# Patient Record
Sex: FEMALE | Race: WHITE | Employment: OTHER | URBAN - METROPOLITAN AREA
[De-identification: names, ages, dates, MRNs, and addresses within clinical notes are randomized per-mention and may not be internally consistent; named-entity substitution may affect disease eponyms.]

---

## 2018-06-05 PROBLEM — N39.0 RECURRENT UTI: Status: ACTIVE | Noted: 2018-06-05

## 2018-06-05 PROBLEM — R33.9 INCOMPLETE BLADDER EMPTYING: Status: ACTIVE | Noted: 2018-06-05

## 2019-07-29 PROBLEM — B02.9 VARICELLA ZOSTER: Status: ACTIVE | Noted: 2019-07-29

## 2020-10-08 ENCOUNTER — TRANSCRIBE ORDER (OUTPATIENT)
Dept: SCHEDULING | Age: 85
End: 2020-10-08

## 2020-10-08 DIAGNOSIS — Z12.31 VISIT FOR SCREENING MAMMOGRAM: Primary | ICD-10-CM

## 2021-07-16 ENCOUNTER — TRANSCRIBE ORDER (OUTPATIENT)
Dept: SCHEDULING | Age: 86
End: 2021-07-16

## 2021-07-16 DIAGNOSIS — R13.10 DYSPHAGIA: Primary | ICD-10-CM

## 2021-08-02 ENCOUNTER — HOSPITAL ENCOUNTER (OUTPATIENT)
Dept: GENERAL RADIOLOGY | Age: 86
Discharge: HOME | End: 2021-08-02
Attending: FAMILY MEDICINE
Payer: MEDICARE

## 2021-08-02 DIAGNOSIS — R13.10 DYSPHAGIA: ICD-10-CM

## 2021-08-02 PROCEDURE — 92611 MOTION FLUOROSCOPY/SWALLOW: CPT

## 2021-08-02 PROCEDURE — 74230 X-RAY XM SWLNG FUNCJ C+: CPT

## 2021-08-02 NOTE — PROGRESS NOTES
[facility]   [address], [address] [place] [address] [address]  Ph: [phone] Fax: [phone]    [address] [place] [address] STUDY  Patient: Louisa Mcintosh (49 y.o. female)  Date: 8/2/2021  Referring Provider: Gwendolyn Taylor NP    SUBJECTIVE:   Patient arrives today with complaints of pharyngeal sensation with food and drink (i.e. \"getting stuck\"), requiring liquid wash/subsequent swallows or oral regurgitation during mealtimes for the past 6 months. Patient presents with no relevant past medical history (i.e. stroke, COPD, etc.), but does report taking medication for reflux. The purpose of today's study is to further investigate patient's swallow function in light of patient's swallowing complaints. OBJECTIVE:   Past Medical History:  Past Medical History:   Diagnosis Date    Arthritis     Cancer ([address]     skin ca removed from nose and leg    Chronic pain     left knee    GERD (gastroesophageal reflux disease)     Hypertension     Menopause     Other ill-defined conditions(799.89)     HIGH CHOLESTEROL    Recurrent UTI     TMJ (dislocation of temporomandibular joint)      Past Surgical History:   Procedure Laterality Date    HX GI      COLONOSCOPY    HX HEENT      BILATERAL CATARACTS,     HX HEENT Right     DETACHED RETINA     HX ORTHOPAEDIC Right 4/2013    TKR    HX TONSILLECTOMY  1940'S     Current Dietary Status: Regular/Thin Liquid  Radiologist: Dr. Demetra Burgos Views: Lateral;Fluoro  Patient Position: Upright in chair    Trial 1: Trial 2:   Consistency Presented: Thin liquid Consistency Presented: Puree   How Presented: Self-fed/presented;Cup/sip;Cup/gulp; Successive swallows How Presented: Self-fed/presented;Spoon         Bolus Acceptance: No impairment Bolus Acceptance: No impairment   Bolus Formation/Control: No impairment:   Bolus Formation/Control: No impairment:     Propulsion: No impairment Propulsion: No impairment   Oral Residue: Palatal;Other (comment) (Trace oral residue visualized on the soft palate) Oral Residue: Lingual   Initiation of Swallow: Triggered at base of tongue Initiation of Swallow: Triggered at base of tongue   Timing: No impairment Timing: No impairment   Penetration: None Penetration: None   Aspiration/Timing: No evidence of aspiration Aspiration/Timing: No evidence of aspiration   Pharyngeal Clearance: No residue Pharyngeal Clearance: Vallecular residue; Less than 10%                       Comments: Subsequent swallow taken to clear oral residue     Trial 3:   Consistency Presented: Solid   How Presented: Self-fed/presented       Bolus Acceptance: No impairment   Bolus Formation/Control: Impaired: Premature spillage; Posterior   Propulsion: Delayed (# of seconds)   Oral Residue: None   Initiation of Swallow: Triggered at valleculae   Timing: Pooling 1-5 sec;Vallecular   Penetration: None   Aspiration/Timing: No evidence of aspiration   Pharyngeal Clearance: Vallecular residue; Less than 10%                     Decreased Tongue Base Retraction?: No  Laryngeal Elevation: WFL (within functional limits); Other (comment) (Mildly decreased hyolaryngeal excursion, but WFL)  Aspiration/Penetration Score: 1 (No penetration or aspiration-Contrast does not enter the airway)  Pharyngeal Symmetry: Not assessed  Pharyngeal-Esophageal Segment: No impairment  Pharyngeal Dysfunction: Decreased pharyngeal wall constriction    Oral Phase Severity: No impairment  Pharyngeal Phase Severity: N/A    ASSESSMENT :  Based on the objective data described above, the patient presents with a functional oropharyngeal swallow with all consistencies tried. Oral phase is characterized by mild premature spillage to the valleculae visualized with solid trial, resulting in mildly delayed swallow initiation.  Pharyngeal phase is characterized by decreased pharyngeal stripping wave and reduced hyolaryngeal excursion, both of which can be considered WFL given patient's advanced age. Mild vallecular residue observed with puree and solid trials. No penetration or aspiration visualized. Due to patient's history of reflux and reports of globus sensation, suspect esophageal dysphagia as oral/pharyngeal phase is [facility]. Recommend Regular/Thin Liquid diet with precautions outlined below. Results of the study were discussed with patient who verbalized understanding. PLAN/RECOMMENDATIONS :  -- Regular/Thin Liquids  -- Small Bites/Sips  -- Limit Distractions  -- Suggest GI referral to further investigate esophageal function     COMMUNICATION/EDUCATION:   The above findings and recommendations were discussed with: patient who verbalized understanding and will be faxed to referring provider.     Thank you for this referral.  MANAV Wiseman  Time Calculation: 45 mins

## 2021-10-04 ENCOUNTER — TRANSCRIBE ORDER (OUTPATIENT)
Dept: REGISTRATION | Age: 86
End: 2021-10-04

## 2021-10-04 ENCOUNTER — HOSPITAL ENCOUNTER (OUTPATIENT)
Dept: GENERAL RADIOLOGY | Age: 86
Discharge: HOME | End: 2021-10-04
Payer: MEDICARE
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
## 2021-10-04 DIAGNOSIS — R07.81 RIB PAIN: ICD-10-CM

## 2021-10-04 DIAGNOSIS — S20.219A CONTUSION OF CHEST WALL: ICD-10-CM

## 2021-10-04 PROCEDURE — 71101 X-RAY EXAM UNILAT RIBS/CHEST: CPT

## 2021-11-08 ENCOUNTER — TRANSCRIBE ORDER (OUTPATIENT)
Dept: SCHEDULING | Age: 86
End: 2021-11-08

## 2021-11-08 DIAGNOSIS — Z12.31 SCREENING MAMMOGRAM FOR HIGH-RISK PATIENT: Primary | ICD-10-CM

## 2022-03-19 PROBLEM — N39.0 RECURRENT UTI: Status: ACTIVE | Noted: 2018-06-05

## 2022-03-19 PROBLEM — B02.9 VARICELLA ZOSTER: Status: ACTIVE | Noted: 2019-07-29

## 2022-03-20 PROBLEM — R33.9 INCOMPLETE BLADDER EMPTYING: Status: ACTIVE | Noted: 2018-06-05

## 2022-03-30 ENCOUNTER — TRANSCRIBE ORDER (OUTPATIENT)
Dept: REGISTRATION | Age: 87
End: 2022-03-30

## 2022-03-30 ENCOUNTER — HOSPITAL ENCOUNTER (OUTPATIENT)
Dept: GENERAL RADIOLOGY | Age: 87
Discharge: HOME | End: 2022-03-30
Payer: MEDICARE
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
## 2022-03-30 DIAGNOSIS — M25.559 PAINFUL HIP: ICD-10-CM

## 2022-03-30 PROCEDURE — 73502 X-RAY EXAM HIP UNI 2-3 VIEWS: CPT

## 2022-03-30 PROCEDURE — 72220 X-RAY EXAM SACRUM TAILBONE: CPT

## 2022-03-31 ENCOUNTER — APPOINTMENT (OUTPATIENT)
Dept: CT IMAGING | Age: 87
End: 2022-03-31
Attending: EMERGENCY MEDICINE
Payer: MEDICARE

## 2022-03-31 ENCOUNTER — HOSPITAL ENCOUNTER (EMERGENCY)
Age: 87
Discharge: HOME | End: 2022-03-31
Attending: EMERGENCY MEDICINE
Payer: MEDICARE

## 2022-03-31 VITALS
HEIGHT: 65 IN | HEART RATE: 78 BPM | BODY MASS INDEX: 27.49 KG/M2 | RESPIRATION RATE: 25 BRPM | WEIGHT: 165 LBS | TEMPERATURE: 97.9 F | DIASTOLIC BLOOD PRESSURE: 79 MMHG | OXYGEN SATURATION: 91 % | SYSTOLIC BLOOD PRESSURE: 163 MMHG

## 2022-03-31 DIAGNOSIS — N30.00 ACUTE CYSTITIS WITHOUT HEMATURIA: Primary | ICD-10-CM

## 2022-03-31 DIAGNOSIS — R11.0 NAUSEA WITHOUT VOMITING: ICD-10-CM

## 2022-03-31 LAB
ALBUMIN SERPL-MCNC: 3.7 G/DL (ref 3.5–5)
ALBUMIN/GLOB SERPL: 1.1 {RATIO} (ref 1.1–2.2)
ALP SERPL-CCNC: 56 U/L (ref 45–117)
ALT SERPL-CCNC: 29 U/L (ref 12–78)
ANION GAP SERPL CALC-SCNC: 15 MMOL/L (ref 5–15)
APPEARANCE UR: ABNORMAL
AST SERPL-CCNC: 26 U/L (ref 15–37)
BACTERIA URNS QL MICRO: ABNORMAL /HPF
BASOPHILS # BLD: 0.1 K/UL (ref 0–0.1)
BASOPHILS NFR BLD: 1 % (ref 0–1)
BILIRUB SERPL-MCNC: 1.1 MG/DL (ref 0.2–1)
BILIRUB UR QL: NEGATIVE
BUN SERPL-MCNC: 17 MG/DL (ref 6–20)
BUN/CREAT SERPL: 18 (ref 12–20)
CALCIUM SERPL-MCNC: 9.6 MG/DL (ref 8.5–10.1)
CHLORIDE SERPL-SCNC: 101 MMOL/L (ref 97–108)
CO2 SERPL-SCNC: 22 MMOL/L (ref 21–32)
COLOR UR: ABNORMAL
COMMENT, HOLDF: NORMAL
CREAT SERPL-MCNC: 0.96 MG/DL (ref 0.55–1.02)
DIFFERENTIAL METHOD BLD: ABNORMAL
EOSINOPHIL # BLD: 0.2 K/UL (ref 0–0.4)
EOSINOPHIL NFR BLD: 2 % (ref 0–7)
EPITH CASTS URNS QL MICRO: ABNORMAL /LPF
ERYTHROCYTE [DISTWIDTH] IN BLOOD BY AUTOMATED COUNT: 13.1 % (ref 11.5–14.5)
GLOBULIN SER CALC-MCNC: 3.5 G/DL (ref 2–4)
GLUCOSE SERPL-MCNC: 146 MG/DL (ref 65–100)
GLUCOSE UR STRIP.AUTO-MCNC: NEGATIVE MG/DL
HCT VFR BLD AUTO: 38.6 % (ref 35–47)
HGB BLD-MCNC: 12.7 G/DL (ref 11.5–16)
HGB UR QL STRIP: NEGATIVE
IMM GRANULOCYTES # BLD AUTO: 0.1 K/UL (ref 0–0.04)
IMM GRANULOCYTES NFR BLD AUTO: 0 % (ref 0–0.5)
KETONES UR QL STRIP.AUTO: ABNORMAL MG/DL
LACTATE SERPL-SCNC: 1.6 MMOL/L (ref 0.4–2)
LEUKOCYTE ESTERASE UR QL STRIP.AUTO: ABNORMAL
LYMPHOCYTES # BLD: 2 K/UL (ref 0.8–3.5)
LYMPHOCYTES NFR BLD: 17 % (ref 12–49)
MCH RBC QN AUTO: 30.7 PG (ref 26–34)
MCHC RBC AUTO-ENTMCNC: 32.9 G/DL (ref 30–36.5)
MCV RBC AUTO: 93.2 FL (ref 80–99)
MONOCYTES # BLD: 0.7 K/UL (ref 0–1)
MONOCYTES NFR BLD: 5 % (ref 5–13)
NEUTS SEG # BLD: 9 K/UL (ref 1.8–8)
NEUTS SEG NFR BLD: 75 % (ref 32–75)
NITRITE UR QL STRIP.AUTO: POSITIVE
NRBC # BLD: 0 K/UL (ref 0–0.01)
NRBC BLD-RTO: 0 PER 100 WBC
PH UR STRIP: 7 [PH] (ref 5–8)
PLATELET # BLD AUTO: 182 K/UL (ref 150–400)
PMV BLD AUTO: 12.9 FL (ref 8.9–12.9)
POTASSIUM SERPL-SCNC: 3.7 MMOL/L (ref 3.5–5.1)
PROT SERPL-MCNC: 7.2 G/DL (ref 6.4–8.2)
PROT UR STRIP-MCNC: NEGATIVE MG/DL
RBC # BLD AUTO: 4.14 M/UL (ref 3.8–5.2)
RBC #/AREA URNS HPF: ABNORMAL /HPF (ref 0–5)
SAMPLES BEING HELD,HOLD: NORMAL
SODIUM SERPL-SCNC: 138 MMOL/L (ref 136–145)
SP GR UR REFRACTOMETRY: 1.01 (ref 1–1.03)
UROBILINOGEN UR QL STRIP.AUTO: 0.2 EU/DL (ref 0.2–1)
WBC # BLD AUTO: 12 K/UL (ref 3.6–11)
WBC URNS QL MICRO: ABNORMAL /HPF (ref 0–4)

## 2022-03-31 PROCEDURE — 96374 THER/PROPH/DIAG INJ IV PUSH: CPT

## 2022-03-31 PROCEDURE — 74177 CT ABD & PELVIS W/CONTRAST: CPT

## 2022-03-31 PROCEDURE — 74011000636 HC RX REV CODE- 636: Performed by: EMERGENCY MEDICINE

## 2022-03-31 PROCEDURE — 99285 EMERGENCY DEPT VISIT HI MDM: CPT

## 2022-03-31 PROCEDURE — 85025 COMPLETE CBC W/AUTO DIFF WBC: CPT

## 2022-03-31 PROCEDURE — 74011250636 HC RX REV CODE- 250/636: Performed by: EMERGENCY MEDICINE

## 2022-03-31 PROCEDURE — 70450 CT HEAD/BRAIN W/O DYE: CPT

## 2022-03-31 PROCEDURE — 83605 ASSAY OF LACTIC ACID: CPT

## 2022-03-31 PROCEDURE — 80053 COMPREHEN METABOLIC PANEL: CPT

## 2022-03-31 PROCEDURE — 36415 COLL VENOUS BLD VENIPUNCTURE: CPT

## 2022-03-31 PROCEDURE — 81001 URINALYSIS AUTO W/SCOPE: CPT

## 2022-03-31 RX ORDER — SODIUM CHLORIDE 0.9 % (FLUSH) 0.9 %
5-10 SYRINGE (ML) INJECTION ONCE
Status: DISCONTINUED | OUTPATIENT
Start: 2022-03-31 | End: 2022-03-31 | Stop reason: HOSPADM

## 2022-03-31 RX ORDER — CEPHALEXIN 500 MG/1
500 CAPSULE ORAL 3 TIMES DAILY
Qty: 21 CAPSULE | Refills: 0 | Status: SHIPPED | OUTPATIENT
Start: 2022-03-31 | End: 2022-04-07

## 2022-03-31 RX ORDER — ONDANSETRON 2 MG/ML
4 INJECTION INTRAMUSCULAR; INTRAVENOUS ONCE
Status: COMPLETED | OUTPATIENT
Start: 2022-03-31 | End: 2022-03-31

## 2022-03-31 RX ADMIN — IOPAMIDOL 100 ML: 612 INJECTION, SOLUTION INTRAVENOUS at 10:20

## 2022-03-31 RX ADMIN — ONDANSETRON 4 MG: 2 INJECTION INTRAMUSCULAR; INTRAVENOUS at 09:21

## 2022-03-31 RX ADMIN — SODIUM CHLORIDE 1000 ML: 9 INJECTION, SOLUTION INTRAVENOUS at 09:21

## 2022-03-31 NOTE — ED TRIAGE NOTES
Pt arrived POV with c/o nausea and vomiting bile since this AM, she also complains of hip pain, she fell the day before yesterday at an exercise class and was sent to this hospital to receive outpatient scans which were negative. [facility] called this morning to inform us they were sending her to be seen b/c they found her on the floor after another fall and she was still complaining of pelvic and hip pain from yesterday.

## 2022-03-31 NOTE — ED PROVIDER NOTES
EMERGENCY DEPARTMENT HISTORY AND PHYSICAL EXAM          Date: 3/31/2022  Patient Name: John Parish    History of Presenting Illness     Chief Complaint   Patient presents with    Nausea       History Provided By: Patient    HPI: John Parish is a 80 y.o. female, pmhx hypertension, GERD, chronic pain, who presents via private automobile to the ED c/o nausea and back pain    Patient was in exercise class yesterday at her facility when she lost her balance and fell backwards on her hip. She was seen yesterday in outpatient radiology and was doing well last night but this morning staff found her on the ground and she appeared altered and confused so they brought her to the ER for evaluation. Patient states she feels extremely nauseous but she has not eaten since yesterday so she has been able to vomit. She denies any fevers, chills as well as any abdominal pain, chest pain, neck pain and shortness of breath. She also denies any headache and dizziness and complains only of left low back pain when she is laying in trying to move. PCP: Rickey Parish NP    Allergies: Multiple  Social Hx: -tobacco, -vaping, -EtOH, -Illicit Drugs; Lives at [facility] assisted living    There are no other complaints, changes, or physical findings at this time. Current Outpatient Medications   Medication Sig Dispense Refill    cephALEXin (Keflex) 500 mg capsule Take 1 Capsule by mouth three (3) times daily for 7 days. 21 Capsule 0    traZODone (DESYREL) 50 mg tablet Take 1 Tab by mouth nightly. 30 Tab 2    ergocalciferol (VITAMIN D2) 50,000 unit capsule Take 1 Cap by mouth every seven (7) days.  aspirin-caffeine (BROCK BACK AND BODY) 500-32.5 mg tab Take  by mouth two (2) times daily as needed. [facility] -Takes two tabs Q AM and 2 Q PM.      VITAMIN B-12 1,000 mcg/mL injection 1 mL by IntraMUSCular route every month.  2 Vial 4    acetaminophen (TYLENOL ARTHRITIS PAIN) 650 mg TbER Take 650 mg by mouth every eight (8) hours.  dilTIAZem CD (CARDIZEM CD) 240 mg ER capsule Take 1 Cap by mouth daily. 30 Cap 5    triamcinolone (NASACORT) 55 mcg nasal inhaler 2 Sprays daily as needed.  melatonin 5 mg cap capsule Take 5 mg by mouth nightly.  Cetirizine (ZYRTEC) 10 mg cap Take 1 Tab by mouth daily as needed. 30 Cap 1    ibuprofen 200 mg cap Take 200 mg by mouth every six (6) hours as needed for Pain.  D-MANNOSE PO Take  by mouth daily as needed (for prevention of UTIs).  cranberry fruit extract (THERACRAN PO) Take  by mouth daily as needed.  ALPRAZolam (XANAX) 0.25 mg tablet Take 0.5 Tabs by mouth two (2) times daily as needed for Anxiety. Max Daily Amount: 0.25 mg. 30 Tab 1    acetaminophen (TYLENOL EXTRA STRENGTH) 500 mg tablet Take 1,000 mg by mouth every six (6) hours as needed for Pain.          Past History     Past Medical History:  Past Medical History:   Diagnosis Date    Arthritis     Cancer ([address]     skin ca removed from nose and leg    Chronic pain     left knee    GERD (gastroesophageal reflux disease)     Hypertension     Menopause     Other ill-defined conditions(799.89)     HIGH CHOLESTEROL    Recurrent UTI     TMJ (dislocation of temporomandibular joint)        Past Surgical History:  Past Surgical History:   Procedure Laterality Date    HX GI      COLONOSCOPY    HX HEENT      BILATERAL CATARACTS,     HX HEENT Right     DETACHED RETINA     HX ORTHOPAEDIC Right 4/2013    TKR    HX TONSILLECTOMY  1940'S       Family History:  Family History   Problem Relation Age of Onset    Cancer Mother         BREAST    Stroke Mother         3 X    Breast Cancer Mother     Cancer Father         BRAIN    Heart Disease Brother     Heart Attack Brother     Breast Cancer Daughter     Anesth Problems Neg Hx        Social History:  Social History     Tobacco Use    Smoking status: Former Smoker     Packs/day: 0.25     Years: 20.00     Pack years: 5.00     Quit date: 4/8/1987 Years since quittin.0    Smokeless tobacco: Never Used   Substance Use Topics    Alcohol use: Yes     Alcohol/week: 0.0 standard drinks     Types: 7 - 14 Glasses of wine per week    Drug use: No       Allergies: Allergies   Allergen Reactions    Other Food Nausea and Vomiting     CANTELOUPE    Ciprofloxacin Rash    Duloxetine Diarrhea and Nausea and Vomiting    Augmentin [Amoxicillin-Pot Clavulanate] Other (comments)     DOES NOT KNOW REACTION      Gabapentin Other (comments)    Penicillins Other (comments)     DOES NOT KNOW REACTION    Codeine Other (comments)     Lower my blood pressure         Review of Systems   Review of Systems   Constitutional: Negative for activity change, appetite change, chills, fever and unexpected weight change. HENT: Negative for congestion. Eyes: Negative for pain and visual disturbance. Respiratory: Negative for cough and shortness of breath. Cardiovascular: Negative for chest pain. Gastrointestinal: Positive for nausea. Negative for abdominal pain, diarrhea and vomiting. Genitourinary: Negative for dysuria. Musculoskeletal: Positive for back pain. Skin: Negative for rash. Neurological: Negative for headaches. Physical Exam   Physical Exam  Vitals and nursing note reviewed. Constitutional:       General: She is in acute distress. Appearance: She is well-developed. She is ill-appearing. She is not diaphoretic. Comments: This is a thin elderly female who appears in acute distress, with normal vital signs   HENT:      Head: Normocephalic and atraumatic. Eyes:      General:         Right eye: No discharge. Left eye: No discharge. Conjunctiva/sclera: Conjunctivae normal.      Pupils: Pupils are equal, round, and reactive to light. Cardiovascular:      Rate and Rhythm: Normal rate and regular rhythm. Heart sounds: Normal heart sounds. No murmur heard. No friction rub. No gallop.     Pulmonary:      Effort: Pulmonary effort is normal. No respiratory distress. Breath sounds: Normal breath sounds. No stridor. No wheezing, rhonchi or rales. Chest:      Chest wall: No tenderness. Abdominal:      General: Bowel sounds are normal. There is no distension. Palpations: Abdomen is soft. There is no mass. Tenderness: There is no abdominal tenderness. There is no guarding or rebound. Hernia: No hernia is present. Musculoskeletal:         General: No swelling, tenderness, deformity or signs of injury. Normal range of motion. Cervical back: Normal range of motion and neck supple. Right lower leg: No edema. Left lower leg: No edema. Comments: Unable to reproduce back pain on palpation. She does point to the area of the iliac crest as the site of pain   Skin:     General: Skin is warm and dry. Findings: No rash. Neurological:      Mental Status: She is alert and oriented to person, place, and time. Cranial Nerves: No cranial nerve deficit. Motor: No abnormal muscle tone. Diagnostic Study Results     Labs -     Recent Results (from the past 12 hour(s))   CBC WITH AUTOMATED DIFF    Collection Time: 03/31/22  8:42 AM   Result Value Ref Range    WBC 12.0 (H) 3.6 - 11.0 K/uL    RBC 4.14 3.80 - 5.20 M/uL    HGB 12.7 11.5 - 16.0 g/dL    HCT 38.6 35.0 - 47.0 %    MCV 93.2 80.0 - 99.0 FL    MCH 30.7 26.0 - 34.0 PG    MCHC 32.9 30.0 - 36.5 g/dL    RDW 13.1 11.5 - 14.5 %    PLATELET 033 473 - 850 K/uL    MPV 12.9 8.9 - 12.9 FL    NRBC 0.0 0  WBC    ABSOLUTE NRBC 0.00 0.00 - 0.01 K/uL    NEUTROPHILS 75 32 - 75 %    LYMPHOCYTES 17 12 - 49 %    MONOCYTES 5 5 - 13 %    EOSINOPHILS 2 0 - 7 %    BASOPHILS 1 0 - 1 %    IMMATURE GRANULOCYTES 0 0.0 - 0.5 %    ABS. NEUTROPHILS 9.0 (H) 1.8 - 8.0 K/UL    ABS. LYMPHOCYTES 2.0 0.8 - 3.5 K/UL    ABS. MONOCYTES 0.7 0.0 - 1.0 K/UL    ABS. EOSINOPHILS 0.2 0.0 - 0.4 K/UL    ABS. BASOPHILS 0.1 0.0 - 0.1 K/UL    ABS. IMM.  GRANS. 0.1 (H) 0.00 - 0.04 K/UL    DF AUTOMATED     METABOLIC PANEL, COMPREHENSIVE    Collection Time: 03/31/22  8:42 AM   Result Value Ref Range    Sodium 138 136 - 145 mmol/L    Potassium 3.7 3.5 - 5.1 mmol/L    Chloride 101 97 - 108 mmol/L    CO2 22 21 - 32 mmol/L    Anion gap 15 5 - 15 mmol/L    Glucose 146 (H) 65 - 100 mg/dL    BUN 17 6 - 20 MG/DL    Creatinine 0.96 0.55 - 1.02 MG/DL    BUN/Creatinine ratio 18 12 - 20      GFR est AA >60 >60 ml/min/1.73m2    GFR est non-AA 55 (L) >60 ml/min/1.73m2    Calcium 9.6 8.5 - 10.1 MG/DL    Bilirubin, total 1.1 (H) 0.2 - 1.0 MG/DL    ALT (SGPT) 29 12 - 78 U/L    AST (SGOT) 26 15 - 37 U/L    Alk. phosphatase 56 45 - 117 U/L    Protein, total 7.2 6.4 - 8.2 g/dL    Albumin 3.7 3.5 - 5.0 g/dL    Globulin 3.5 2.0 - 4.0 g/dL    A-G Ratio 1.1 1.1 - 2.2     LACTIC ACID    Collection Time: 03/31/22  8:42 AM   Result Value Ref Range    Lactic acid 1.6 0.4 - 2.0 MMOL/L   SAMPLES BEING HELD    Collection Time: 03/31/22  8:42 AM   Result Value Ref Range    SAMPLES BEING HELD 1SST, 1RED, 1BLUE     COMMENT        Add-on orders for these samples will be processed based on acceptable specimen integrity and analyte stability, which may vary by analyte. URINALYSIS W/ RFLX MICROSCOPIC    Collection Time: 03/31/22 10:38 AM   Result Value Ref Range    Color YELLOW/STRAW      Appearance HAZY (A) CLEAR      Specific gravity 1.010 1.003 - 1.030      pH (UA) 7.0 5.0 - 8.0      Protein Negative NEG mg/dL    Glucose Negative NEG mg/dL    Ketone TRACE (A) NEG mg/dL    Bilirubin Negative NEG      Blood Negative NEG      Urobilinogen 0.2 0.2 - 1.0 EU/dL    Nitrites Positive (A) NEG      Leukocyte Esterase SMALL (A) NEG     URINE MICROSCOPIC ONLY    Collection Time: 03/31/22 10:38 AM   Result Value Ref Range    WBC 5-10 0 - 4 /hpf    RBC 0-5 0 - 5 /hpf    Epithelial cells MODERATE (A) FEW /lpf    Bacteria 2+ (A) NEG /hpf       Radiologic Studies -   CT ABD PELV W CONT   Final Result   1. Patulous appearing gallbladder.  No calcified gallstones identified. No CT   evidence of cholecystitis. 2. Presence of a small, fat-containing inguinal hernia on the right. CT HEAD WO CONT   Final Result   1. No evidence of intracranial hemorrhage   2. Presence of moderate periventricular low density compatible with white matter   disease. CT Results  (Last 48 hours)               03/31/22 1029  CT ABD PELV W CONT Final result    Impression:  1. Patulous appearing gallbladder. No calcified gallstones identified. No CT   evidence of cholecystitis. 2. Presence of a small, fat-containing inguinal hernia on the right. Narrative:  EXAM: CT ABD PELV W CONT       INDICATION: vomiting bile/no pain       COMPARISON: None        CONTRAST: 100 mL of Isovue-300. TECHNIQUE:    Following the uneventful intravenous administration of contrast, thin axial   images were obtained through the abdomen and pelvis. Coronal and sagittal   reconstructions were generated. Oral contrast was not administered. CT dose   reduction was achieved through use of a standardized protocol tailored for this   examination and automatic exposure control for dose modulation. FINDINGS:    Lung bases: Clear. Incidentally imaged heart and mediastinum: Unremarkable. Liver: No mass or biliary dilatation. Gallbladder: The gallbladder is patulous in appearance. No calcified gallstones   are seen. There is no evidence of gallbladder wall thickening or pericholecystic   fluid/inflammatory change. Spleen: Within normal limits. Pancreas: No mass or ductal dilatation. Adrenals: Unremarkable. Kidneys: No mass or hydronephrosis. Stomach: Unremarkable. Small bowel: No dilatation or wall thickening. Colon: No dilatation or wall thickening. A moderate amount of gas and fecal   material is noted within the colon. There is evidence of colonic diverticulosis. Appendix: Not visualized   Peritoneum: No ascites or pneumoperitoneum.    Retroperitoneum: No lymphadenopathy or aortic aneurysm. Reproductive organs: The uterus is normal in appearance. Urinary bladder: No mass or calculus. Bones: No destructive bone lesion. There is evidence of spondylosis involving   the spine. There is evidence of lumbar scoliosis convex to the right. Additional comments: As seen on axial image 66, a small, fat-containing inguinal   hernia is noted on the right. 03/31/22 0910  CT HEAD WO CONT Final result    Impression:  1. No evidence of intracranial hemorrhage   2. Presence of moderate periventricular low density compatible with white matter   disease. Narrative:  EXAM: CT HEAD WO CONT       INDICATION: Fall yesterday with vomiting today       COMPARISON: None. CONTRAST: None. TECHNIQUE: Unenhanced CT of the head was performed using 5 mm images. Brain and   bone windows were generated. CT dose reduction was achieved through use of a   standardized protocol tailored for this examination and automatic exposure   control for dose modulation. FINDINGS:   No calvarial abnormalities are detected. Specifically, no depressed skull   fractures are seen. 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 There is no evidence of intracranial hemorrhage. Moderate periventricular low   density is present. This is compatible with white matter disease. There is   evidence of mild cerebral and cerebellar atrophy. CXR Results  (Last 48 hours)    None            Medical Decision Making   I am the first provider for this patient. I reviewed the vital signs, available nursing notes, past medical history, past surgical history, family history and social history. Vital Signs-Reviewed the patient's vital signs.   Patient Vitals for the past 12 hrs:   Temp Pulse Resp BP SpO2   03/31/22 1212   25  91 %   03/31/22 1157   19 (!) 163/79 95 %   03/31/22 1142  78 22  94 %   03/31/22 1127  76 19 (!) 173/79 100 %   03/31/22 1112  71 20 (!) 209/100 99 %   03/31/22 1057  70 18 (!) 185/83 95 %   03/31/22 1042  72 21 (!) 193/80 96 %   03/31/22 1012  70 17  94 %   03/31/22 0957  72 21 (!) 165/64 91 %   03/31/22 0942  77 23 (!) 174/71 93 %   03/31/22 0927  60 18 (!) 149/66 95 %   03/31/22 0902  66 21     03/31/22 0901  (!) 59 19 (!) 141/59 97 %   03/31/22 0857  60 19  92 %   03/31/22 0842    (!) 180/81    03/31/22 0832 97.9 °F (36.6 °C) 62 18 (!) 144/61 95 %       Pulse Oximetry Analysis - 97% on RA    Records Reviewed: Nursing Notes, Old Medical Records, Previous Radiology Studies and Previous Laboratory Studies    Provider Notes (Medical Decision Making):   MDM: Elderly female presents for evaluation of nausea and vomiting after fall yesterday and repeat fall this morning. On physical exam I do not see any evidence of focal musculoskeletal injury. She is awake and alert and acting appropriate and pupils are responsive. We will send her for CT head to make sure she did not have an injury in the fall this morning since staff found her on the ground after an unwitnessed fall. In regards to her pelvic pain, she had x-rays yesterday but will need CT pelvis to make sure there is not a subtle fracture missed on plain film imaging. ED Course:   Initial assessment performed. The patients presenting problems have been discussed, and they are in agreement with the care plan formulated and outlined with them. I have encouraged them to ask questions as they arise throughout their visit. PROGRESS NOTE:    ED Course as of 03/31/22 1732   Thu Mar 31, 2022   9888 CT had labs reviewed without focal abnormality except for a white count of 12. Request CT abdomen pelvis with urinalysis to be obtained. Patient appears improved after antiemetics. [JT]   1007 Patient reevaluated and states she feels much better. Discussed CT head findings and recommendations for CT pelvis and she understands. Await urinalysis.  [JT]   1222 Patient tolerating p.o. Antibiotics infused. Ready for discharge. [JT]      ED Course User Index  [JT] Jase Espinal MD        Discharge note:  Pt re-evaluated and noted to be feeling better, ready for discharge. Updated pt on all final lab and imaging findings. Will follow up as instructed. All questions have been answered, pt voiced understanding and agreement with plan. Abx were prescribed, pt advised that diarrhea and rash are possible side effects of the medications. Specific return precautions provided as well as instructions to return to the ED should sx worsen at any time. Vital signs stable for discharge. Critical Care Time:   0      Diagnosis     Clinical Impression:   1. Acute cystitis without hematuria    2. Nausea without vomiting        PLAN:  1. Discharge Medication List as of 3/31/2022 11:37 AM      START taking these medications    Details   cephALEXin (Keflex) 500 mg capsule Take 1 Capsule by mouth three (3) times daily for 7 days. , Normal, Disp-21 Capsule, R-0         CONTINUE these medications which have NOT CHANGED    Details   traZODone (DESYREL) 50 mg tablet Take 1 Tab by mouth nightly., Normal, Disp-30 Tab, R-2      ergocalciferol (VITAMIN D2) 50,000 unit capsule Take 1 Cap by mouth every seven (7) days. , Historical Med      aspirin-caffeine (BROCK BACK AND BODY) 500-32.5 mg tab Take  by mouth two (2) times daily as needed. [facility] -Takes two tabs Q AM and 2 Q PM., Historical Med      VITAMIN B-12 1,000 mcg/mL injection 1 mL by IntraMUSCular route every month., No Print, Disp-2 Vial, R-4, PABLO      acetaminophen (TYLENOL ARTHRITIS PAIN) 650 mg TbER Take 650 mg by mouth every eight (8) hours. , Historical Med      dilTIAZem CD (CARDIZEM CD) 240 mg ER capsule Take 1 Cap by mouth daily. , Normal, Disp-30 Cap, R-5      triamcinolone (NASACORT) 55 mcg nasal inhaler 2 Sprays daily as needed., Historical Med      melatonin 5 mg cap capsule Take 5 mg by mouth nightly., Historical Med      Cetirizine (ZYRTEC) 10 mg cap Take 1 Tab by mouth daily as needed., Normal, Disp-30 Cap, R-1      ibuprofen 200 mg cap Take 200 mg by mouth every six (6) hours as needed for Pain., Historical Med      D-MANNOSE PO Take  by mouth daily as needed (for prevention of UTIs). , Historical Med      cranberry fruit extract (THERACRAN PO) Take  by mouth daily as needed., Historical Med      ALPRAZolam (XANAX) 0.25 mg tablet Take 0.5 Tabs by mouth two (2) times daily as needed for Anxiety. Max Daily Amount: 0.25 mg., Print, Disp-30 Tab, R-1      acetaminophen (TYLENOL EXTRA STRENGTH) 500 mg tablet Take 1,000 mg by mouth every six (6) hours as needed for Pain., Historical Med           2. Follow-up Information     Follow up With Specialties Details Why Contact Bruce Little NP Nurse Practitioner  for recheck in 24 hours, If symptoms worsen         Return to ED if worse     Disposition:  Home       Please note, this dictation was completed with [employer], the [employer] voice recognition software. Quite often unanticipated grammatical, syntax, homophones, and other interpretive errors are inadvertently transcribed by the computer software. Please disregard these errors. Please excuse any errors that have escaped final proof reading.

## 2022-03-31 NOTE — DISCHARGE INSTRUCTIONS
Ms. Thierno Hernandez was sent to the ER for nausea and a second fall this morning. Her CT scans do not show any injury in her brain or any injury in her pelvis and back. Her urine test shows she has a urine infection and her blood shows that her white cell count is 12,000. We have given her a dose of ceftriaxone with fluids and Zofran here in the emergency room. She can continue on the antibiotic as prescribed outpatient.

## 2022-04-18 ENCOUNTER — TRANSCRIBE ORDER (OUTPATIENT)
Dept: SCHEDULING | Age: 87
End: 2022-04-18
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
## 2022-04-18 DIAGNOSIS — G89.29 CHRONIC BACK PAIN: Primary | ICD-10-CM
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
## 2022-04-18 DIAGNOSIS — M54.9 CHRONIC BACK PAIN: Primary | ICD-10-CM

## 2022-04-18 DIAGNOSIS — M25.551 RIGHT HIP PAIN: ICD-10-CM

## 2022-04-19 ENCOUNTER — HOSPITAL ENCOUNTER (OUTPATIENT)
Dept: MRI IMAGING | Age: 87
Discharge: HOME | End: 2022-04-19
Attending: INTERNAL MEDICINE
Payer: MEDICARE

## 2022-04-19 DIAGNOSIS — M54.9 CHRONIC BACK PAIN: ICD-10-CM
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
## 2022-04-19 DIAGNOSIS — G89.29 CHRONIC BACK PAIN: ICD-10-CM
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
## 2022-04-19 DIAGNOSIS — M25.551 RIGHT HIP PAIN: ICD-10-CM

## 2022-04-19 PROCEDURE — 72148 MRI LUMBAR SPINE W/O DYE: CPT

## 2022-04-28 ENCOUNTER — HOSPITAL ENCOUNTER (OUTPATIENT)
Dept: LAB | Age: 87
Discharge: HOME | End: 2022-04-28
Payer: MEDICARE

## 2022-04-28 LAB
ALBUMIN SERPL-MCNC: 3.2 G/DL (ref 3.5–5)
ALBUMIN/GLOB SERPL: 1.1 {RATIO} (ref 1.1–2.2)
ALP SERPL-CCNC: 86 U/L (ref 45–117)
ALT SERPL-CCNC: 26 U/L (ref 12–78)
ANION GAP SERPL CALC-SCNC: 9 MMOL/L (ref 5–15)
AST SERPL-CCNC: 18 U/L (ref 15–37)
BILIRUB SERPL-MCNC: 0.4 MG/DL (ref 0.2–1)
BUN SERPL-MCNC: 12 MG/DL (ref 6–20)
BUN/CREAT SERPL: 20 (ref 12–20)
CALCIUM SERPL-MCNC: 8.5 MG/DL (ref 8.5–10.1)
CHLORIDE SERPL-SCNC: 95 MMOL/L (ref 97–108)
CO2 SERPL-SCNC: 26 MMOL/L (ref 21–32)
CREAT SERPL-MCNC: 0.61 MG/DL (ref 0.55–1.02)
GLOBULIN SER CALC-MCNC: 2.9 G/DL (ref 2–4)
GLUCOSE SERPL-MCNC: 85 MG/DL (ref 65–100)
POTASSIUM SERPL-SCNC: 4.2 MMOL/L (ref 3.5–5.1)
PROT SERPL-MCNC: 6.1 G/DL (ref 6.4–8.2)
SODIUM SERPL-SCNC: 130 MMOL/L (ref 136–145)

## 2022-04-28 PROCEDURE — 80053 COMPREHEN METABOLIC PANEL: CPT

## 2022-05-02 ENCOUNTER — HOSPITAL ENCOUNTER (OUTPATIENT)
Dept: LAB | Age: 87
Discharge: HOME | End: 2022-05-02
Payer: MEDICARE

## 2022-05-02 LAB
ALBUMIN SERPL-MCNC: 3.2 G/DL (ref 3.5–5)
ALBUMIN/GLOB SERPL: 1.1 {RATIO} (ref 1.1–2.2)
ALP SERPL-CCNC: 76 U/L (ref 45–117)
ALT SERPL-CCNC: 23 U/L (ref 12–78)
ANION GAP SERPL CALC-SCNC: 11 MMOL/L (ref 5–15)
AST SERPL-CCNC: 19 U/L (ref 15–37)
BILIRUB SERPL-MCNC: 0.4 MG/DL (ref 0.2–1)
BUN SERPL-MCNC: 11 MG/DL (ref 6–20)
BUN/CREAT SERPL: 18 (ref 12–20)
CALCIUM SERPL-MCNC: 8.2 MG/DL (ref 8.5–10.1)
CHLORIDE SERPL-SCNC: 96 MMOL/L (ref 97–108)
CO2 SERPL-SCNC: 24 MMOL/L (ref 21–32)
CREAT SERPL-MCNC: 0.62 MG/DL (ref 0.55–1.02)
GLOBULIN SER CALC-MCNC: 2.8 G/DL (ref 2–4)
GLUCOSE SERPL-MCNC: 83 MG/DL (ref 65–100)
POTASSIUM SERPL-SCNC: 4.5 MMOL/L (ref 3.5–5.1)
PROT SERPL-MCNC: 6 G/DL (ref 6.4–8.2)
SODIUM SERPL-SCNC: 131 MMOL/L (ref 136–145)

## 2022-05-02 PROCEDURE — 80053 COMPREHEN METABOLIC PANEL: CPT

## 2022-05-06 ENCOUNTER — HOSPITAL ENCOUNTER (OUTPATIENT)
Dept: LAB | Age: 87
Discharge: HOME | End: 2022-05-06
Payer: MEDICARE

## 2022-05-06 LAB
ALBUMIN SERPL-MCNC: 3.2 G/DL (ref 3.5–5)
ALBUMIN/GLOB SERPL: 1.1 {RATIO} (ref 1.1–2.2)
ALP SERPL-CCNC: 74 U/L (ref 45–117)
ALT SERPL-CCNC: 27 U/L (ref 12–78)
ANION GAP SERPL CALC-SCNC: 11 MMOL/L (ref 5–15)
AST SERPL-CCNC: 18 U/L (ref 15–37)
BILIRUB SERPL-MCNC: 0.4 MG/DL (ref 0.2–1)
BUN SERPL-MCNC: 14 MG/DL (ref 6–20)
BUN/CREAT SERPL: 21 (ref 12–20)
CALCIUM SERPL-MCNC: 8.6 MG/DL (ref 8.5–10.1)
CHLORIDE SERPL-SCNC: 97 MMOL/L (ref 97–108)
CO2 SERPL-SCNC: 25 MMOL/L (ref 21–32)
CREAT SERPL-MCNC: 0.66 MG/DL (ref 0.55–1.02)
GLOBULIN SER CALC-MCNC: 2.9 G/DL (ref 2–4)
GLUCOSE SERPL-MCNC: 99 MG/DL (ref 65–100)
POTASSIUM SERPL-SCNC: 4.3 MMOL/L (ref 3.5–5.1)
PROT SERPL-MCNC: 6.1 G/DL (ref 6.4–8.2)
SODIUM SERPL-SCNC: 133 MMOL/L (ref 136–145)

## 2022-05-06 PROCEDURE — 80053 COMPREHEN METABOLIC PANEL: CPT

## 2022-05-13 ENCOUNTER — HOSPITAL ENCOUNTER (OUTPATIENT)
Dept: LAB | Age: 87
Discharge: HOME | End: 2022-05-13
Payer: MEDICARE

## 2022-05-13 LAB
ALBUMIN SERPL-MCNC: 3.2 G/DL (ref 3.5–5)
ALBUMIN/GLOB SERPL: 1.2 {RATIO} (ref 1.1–2.2)
ALP SERPL-CCNC: 70 U/L (ref 45–117)
ALT SERPL-CCNC: 21 U/L (ref 12–78)
ANION GAP SERPL CALC-SCNC: 12 MMOL/L (ref 5–15)
AST SERPL-CCNC: 14 U/L (ref 15–37)
BILIRUB SERPL-MCNC: 0.4 MG/DL (ref 0.2–1)
BUN SERPL-MCNC: 15 MG/DL (ref 6–20)
BUN/CREAT SERPL: 22 (ref 12–20)
CALCIUM SERPL-MCNC: 8.6 MG/DL (ref 8.5–10.1)
CHLORIDE SERPL-SCNC: 98 MMOL/L (ref 97–108)
CO2 SERPL-SCNC: 24 MMOL/L (ref 21–32)
CREAT SERPL-MCNC: 0.69 MG/DL (ref 0.55–1.02)
GLOBULIN SER CALC-MCNC: 2.7 G/DL (ref 2–4)
GLUCOSE SERPL-MCNC: 78 MG/DL (ref 65–100)
POTASSIUM SERPL-SCNC: 4.4 MMOL/L (ref 3.5–5.1)
PROT SERPL-MCNC: 5.9 G/DL (ref 6.4–8.2)
SODIUM SERPL-SCNC: 134 MMOL/L (ref 136–145)

## 2022-05-13 PROCEDURE — 80053 COMPREHEN METABOLIC PANEL: CPT

## 2022-05-20 ENCOUNTER — HOSPITAL ENCOUNTER (OUTPATIENT)
Dept: LAB | Age: 87
Discharge: HOME | End: 2022-05-20
Payer: MEDICARE

## 2022-05-20 LAB
ALBUMIN SERPL-MCNC: 3.4 G/DL (ref 3.5–5)
ALBUMIN/GLOB SERPL: 1.1 {RATIO} (ref 1.1–2.2)
ALP SERPL-CCNC: 80 U/L (ref 45–117)
ALT SERPL-CCNC: 20 U/L (ref 12–78)
ANION GAP SERPL CALC-SCNC: 10 MMOL/L (ref 5–15)
AST SERPL-CCNC: 16 U/L (ref 15–37)
BILIRUB SERPL-MCNC: 0.6 MG/DL (ref 0.2–1)
BUN SERPL-MCNC: 12 MG/DL (ref 6–20)
BUN/CREAT SERPL: 16 (ref 12–20)
CALCIUM SERPL-MCNC: 8.9 MG/DL (ref 8.5–10.1)
CHLORIDE SERPL-SCNC: 95 MMOL/L (ref 97–108)
CO2 SERPL-SCNC: 25 MMOL/L (ref 21–32)
CREAT SERPL-MCNC: 0.73 MG/DL (ref 0.55–1.02)
GLOBULIN SER CALC-MCNC: 3 G/DL (ref 2–4)
GLUCOSE SERPL-MCNC: 99 MG/DL (ref 65–100)
POTASSIUM SERPL-SCNC: 4.7 MMOL/L (ref 3.5–5.1)
PROT SERPL-MCNC: 6.4 G/DL (ref 6.4–8.2)
SODIUM SERPL-SCNC: 130 MMOL/L (ref 136–145)

## 2022-05-20 PROCEDURE — 80053 COMPREHEN METABOLIC PANEL: CPT

## 2022-05-23 ENCOUNTER — HOSPITAL ENCOUNTER (OUTPATIENT)
Dept: LAB | Age: 87
Discharge: HOME | End: 2022-05-23
Attending: INTERNAL MEDICINE
Payer: MEDICARE

## 2022-05-23 ENCOUNTER — APPOINTMENT (OUTPATIENT)
Dept: LAB | Age: 87
DRG: 178 | End: 2022-05-23
Payer: MEDICARE

## 2022-05-23 PROCEDURE — 87077 CULTURE AEROBIC IDENTIFY: CPT

## 2022-05-23 PROCEDURE — 81001 URINALYSIS AUTO W/SCOPE: CPT

## 2022-05-23 PROCEDURE — 36415 COLL VENOUS BLD VENIPUNCTURE: CPT

## 2022-05-23 PROCEDURE — 87086 URINE CULTURE/COLONY COUNT: CPT

## 2022-05-23 PROCEDURE — 87186 SC STD MICRODIL/AGAR DIL: CPT

## 2022-05-23 PROCEDURE — 80053 COMPREHEN METABOLIC PANEL: CPT

## 2022-05-23 PROCEDURE — 85025 COMPLETE CBC W/AUTO DIFF WBC: CPT

## 2022-05-24 ENCOUNTER — HOSPITAL ENCOUNTER (OUTPATIENT)
Dept: LAB | Age: 87
Discharge: HOME | End: 2022-05-24

## 2022-05-24 ENCOUNTER — TRANSCRIBE ORDER (OUTPATIENT)
Dept: REGISTRATION | Age: 87
End: 2022-05-24

## 2022-05-24 DIAGNOSIS — R41.0 SUBACUTE DELIRIUM: Primary | ICD-10-CM

## 2022-05-24 DIAGNOSIS — R41.0 SUBACUTE DELIRIUM: ICD-10-CM

## 2022-05-24 LAB
ALBUMIN SERPL-MCNC: 3.2 G/DL (ref 3.5–5)
ALBUMIN/GLOB SERPL: 1 {RATIO} (ref 1.1–2.2)
ALP SERPL-CCNC: 78 U/L (ref 45–117)
ALT SERPL-CCNC: 19 U/L (ref 12–78)
ANION GAP SERPL CALC-SCNC: 11 MMOL/L (ref 5–15)
APPEARANCE UR: CLEAR
AST SERPL-CCNC: 17 U/L (ref 15–37)
BACTERIA URNS QL MICRO: ABNORMAL /HPF
BASOPHILS # BLD: 0.1 K/UL (ref 0–0.1)
BASOPHILS NFR BLD: 1 % (ref 0–1)
BILIRUB SERPL-MCNC: 0.3 MG/DL (ref 0.2–1)
BILIRUB UR QL: NEGATIVE
BUN SERPL-MCNC: 13 MG/DL (ref 6–20)
BUN/CREAT SERPL: 18 (ref 12–20)
CALCIUM SERPL-MCNC: 8.4 MG/DL (ref 8.5–10.1)
CHLORIDE SERPL-SCNC: 92 MMOL/L (ref 97–108)
CO2 SERPL-SCNC: 23 MMOL/L (ref 21–32)
COLOR UR: ABNORMAL
CREAT SERPL-MCNC: 0.71 MG/DL (ref 0.55–1.02)
DIFFERENTIAL METHOD BLD: ABNORMAL
EOSINOPHIL # BLD: 0.1 K/UL (ref 0–0.4)
EOSINOPHIL NFR BLD: 2 % (ref 0–7)
EPITH CASTS URNS QL MICRO: ABNORMAL /LPF
ERYTHROCYTE [DISTWIDTH] IN BLOOD BY AUTOMATED COUNT: 12.7 % (ref 11.5–14.5)
GLOBULIN SER CALC-MCNC: 3.1 G/DL (ref 2–4)
GLUCOSE SERPL-MCNC: 137 MG/DL (ref 65–100)
GLUCOSE UR STRIP.AUTO-MCNC: NEGATIVE MG/DL
HCT VFR BLD AUTO: 31.2 % (ref 35–47)
HGB BLD-MCNC: 10.8 G/DL (ref 11.5–16)
HGB UR QL STRIP: NEGATIVE
IMM GRANULOCYTES # BLD AUTO: 0 K/UL (ref 0–0.04)
IMM GRANULOCYTES NFR BLD AUTO: 0 % (ref 0–0.5)
KETONES UR QL STRIP.AUTO: ABNORMAL MG/DL
LEUKOCYTE ESTERASE UR QL STRIP.AUTO: ABNORMAL
LYMPHOCYTES # BLD: 1.4 K/UL (ref 0.8–3.5)
LYMPHOCYTES NFR BLD: 19 % (ref 12–49)
MCH RBC QN AUTO: 30 PG (ref 26–34)
MCHC RBC AUTO-ENTMCNC: 34.6 G/DL (ref 30–36.5)
MCV RBC AUTO: 86.7 FL (ref 80–99)
MONOCYTES # BLD: 1 K/UL (ref 0–1)
MONOCYTES NFR BLD: 13 % (ref 5–13)
NEUTS SEG # BLD: 4.8 K/UL (ref 1.8–8)
NEUTS SEG NFR BLD: 65 % (ref 32–75)
NITRITE UR QL STRIP.AUTO: POSITIVE
NRBC # BLD: 0 K/UL (ref 0–0.01)
NRBC BLD-RTO: 0 PER 100 WBC
PH UR STRIP: 6.5 [PH] (ref 5–8)
PLATELET # BLD AUTO: 195 K/UL (ref 150–400)
PLATELET COMMENTS,PCOM: ABNORMAL
PMV BLD AUTO: 13.8 FL (ref 8.9–12.9)
POTASSIUM SERPL-SCNC: 3.9 MMOL/L (ref 3.5–5.1)
PROT SERPL-MCNC: 6.3 G/DL (ref 6.4–8.2)
PROT UR STRIP-MCNC: NEGATIVE MG/DL
RBC # BLD AUTO: 3.6 M/UL (ref 3.8–5.2)
RBC #/AREA URNS HPF: ABNORMAL /HPF (ref 0–5)
RBC MORPH BLD: ABNORMAL
SODIUM SERPL-SCNC: 126 MMOL/L (ref 136–145)
SP GR UR REFRACTOMETRY: 1.01 (ref 1–1.03)
UA: UC IF INDICATED,UAUC: ABNORMAL
UROBILINOGEN UR QL STRIP.AUTO: 0.2 EU/DL (ref 0.2–1)
WBC # BLD AUTO: 7.4 K/UL (ref 3.6–11)
WBC URNS QL MICRO: ABNORMAL /HPF (ref 0–4)

## 2022-05-25 ENCOUNTER — HOSPITAL ENCOUNTER (OUTPATIENT)
Dept: LAB | Age: 87
Discharge: HOME | End: 2022-05-25
Payer: MEDICARE

## 2022-05-25 LAB
ANION GAP SERPL CALC-SCNC: 11 MMOL/L (ref 5–15)
BUN SERPL-MCNC: 11 MG/DL (ref 6–20)
BUN/CREAT SERPL: 19 (ref 12–20)
CALCIUM SERPL-MCNC: 8.1 MG/DL (ref 8.5–10.1)
CHLORIDE SERPL-SCNC: 96 MMOL/L (ref 97–108)
CO2 SERPL-SCNC: 24 MMOL/L (ref 21–32)
CREAT SERPL-MCNC: 0.58 MG/DL (ref 0.55–1.02)
GLUCOSE SERPL-MCNC: 88 MG/DL (ref 65–100)
POTASSIUM SERPL-SCNC: 4.6 MMOL/L (ref 3.5–5.1)
SODIUM SERPL-SCNC: 131 MMOL/L (ref 136–145)

## 2022-05-25 PROCEDURE — 80048 BASIC METABOLIC PNL TOTAL CA: CPT

## 2022-05-26 LAB
BACTERIA SPEC CULT: ABNORMAL
BACTERIA SPEC CULT: ABNORMAL
CC UR VC: ABNORMAL
SARS-COV-2, NAA: DETECTED
SERVICE CMNT-IMP: ABNORMAL

## 2022-05-29 ENCOUNTER — APPOINTMENT (OUTPATIENT)
Dept: CT IMAGING | Age: 87
DRG: 178 | End: 2022-05-29
Attending: FAMILY MEDICINE
Payer: MEDICARE

## 2022-05-29 ENCOUNTER — APPOINTMENT (OUTPATIENT)
Dept: GENERAL RADIOLOGY | Age: 87
DRG: 178 | End: 2022-05-29
Attending: FAMILY MEDICINE
Payer: MEDICARE

## 2022-05-29 ENCOUNTER — HOSPITAL ENCOUNTER (INPATIENT)
Age: 87
LOS: 9 days | Discharge: NURSING, REHAB OR LONG-TERM CARE | DRG: 178 | End: 2022-06-09
Attending: FAMILY MEDICINE | Admitting: INTERNAL MEDICINE
Payer: MEDICARE

## 2022-05-29 DIAGNOSIS — G93.40 ENCEPHALOPATHY: ICD-10-CM

## 2022-05-29 DIAGNOSIS — I67.9 CEREBRAL VASCULAR DISEASE: ICD-10-CM

## 2022-05-29 DIAGNOSIS — U07.1 COVID-19: ICD-10-CM

## 2022-05-29 DIAGNOSIS — R41.0 DELIRIUM: ICD-10-CM

## 2022-05-29 DIAGNOSIS — R41.0 DISORIENTATION: Primary | ICD-10-CM

## 2022-05-29 PROBLEM — R41.82 AMS (ALTERED MENTAL STATUS): Status: ACTIVE | Noted: 2022-05-29

## 2022-05-29 LAB
ALBUMIN SERPL-MCNC: 3.5 G/DL (ref 3.5–5)
ALBUMIN/GLOB SERPL: 1.1 {RATIO} (ref 1.1–2.2)
ALP SERPL-CCNC: 69 U/L (ref 45–117)
ALT SERPL-CCNC: 25 U/L (ref 12–78)
ANION GAP SERPL CALC-SCNC: 12 MMOL/L (ref 5–15)
APPEARANCE UR: CLEAR
AST SERPL-CCNC: 29 U/L (ref 15–37)
BACTERIA URNS QL MICRO: ABNORMAL /HPF
BASOPHILS # BLD: 0.1 K/UL (ref 0–0.1)
BASOPHILS NFR BLD: 1 % (ref 0–1)
BILIRUB SERPL-MCNC: 0.5 MG/DL (ref 0.2–1)
BILIRUB UR QL: NEGATIVE
BUN SERPL-MCNC: 19 MG/DL (ref 6–20)
BUN/CREAT SERPL: 14 (ref 12–20)
CALCIUM SERPL-MCNC: 9 MG/DL (ref 8.5–10.1)
CHLORIDE SERPL-SCNC: 93 MMOL/L (ref 97–108)
CO2 SERPL-SCNC: 22 MMOL/L (ref 21–32)
COLOR UR: ABNORMAL
CREAT SERPL-MCNC: 1.35 MG/DL (ref 0.55–1.02)
DIFFERENTIAL METHOD BLD: ABNORMAL
EOSINOPHIL # BLD: 0.1 K/UL (ref 0–0.4)
EOSINOPHIL NFR BLD: 1 % (ref 0–7)
EPITH CASTS URNS QL MICRO: ABNORMAL /LPF
ERYTHROCYTE [DISTWIDTH] IN BLOOD BY AUTOMATED COUNT: 12.8 % (ref 11.5–14.5)
GLOBULIN SER CALC-MCNC: 3.1 G/DL (ref 2–4)
GLUCOSE BLD STRIP.AUTO-MCNC: 112 MG/DL (ref 65–117)
GLUCOSE SERPL-MCNC: 98 MG/DL (ref 65–100)
GLUCOSE UR STRIP.AUTO-MCNC: NEGATIVE MG/DL
HCT VFR BLD AUTO: 32 % (ref 35–47)
HGB BLD-MCNC: 11.3 G/DL (ref 11.5–16)
HGB UR QL STRIP: ABNORMAL
IMM GRANULOCYTES # BLD AUTO: 0.1 K/UL (ref 0–0.04)
IMM GRANULOCYTES NFR BLD AUTO: 2 % (ref 0–0.5)
KETONES UR QL STRIP.AUTO: ABNORMAL MG/DL
LACTATE SERPL-SCNC: 1 MMOL/L (ref 0.4–2)
LEUKOCYTE ESTERASE UR QL STRIP.AUTO: NEGATIVE
LYMPHOCYTES # BLD: 1.5 K/UL (ref 0.8–3.5)
LYMPHOCYTES NFR BLD: 18 % (ref 12–49)
MCH RBC QN AUTO: 29.9 PG (ref 26–34)
MCHC RBC AUTO-ENTMCNC: 35.3 G/DL (ref 30–36.5)
MCV RBC AUTO: 84.7 FL (ref 80–99)
MONOCYTES # BLD: 1.2 K/UL (ref 0–1)
MONOCYTES NFR BLD: 14 % (ref 5–13)
NEUTS SEG # BLD: 5.4 K/UL (ref 1.8–8)
NEUTS SEG NFR BLD: 64 % (ref 32–75)
NITRITE UR QL STRIP.AUTO: NEGATIVE
NRBC # BLD: 0 K/UL (ref 0–0.01)
NRBC BLD-RTO: 0 PER 100 WBC
PH UR STRIP: 6 [PH] (ref 5–8)
PLATELET # BLD AUTO: 220 K/UL (ref 150–400)
PMV BLD AUTO: 12.6 FL (ref 8.9–12.9)
POTASSIUM SERPL-SCNC: 4.1 MMOL/L (ref 3.5–5.1)
PROT SERPL-MCNC: 6.6 G/DL (ref 6.4–8.2)
PROT UR STRIP-MCNC: ABNORMAL MG/DL
RBC # BLD AUTO: 3.78 M/UL (ref 3.8–5.2)
RBC #/AREA URNS HPF: ABNORMAL /HPF (ref 0–5)
SERVICE CMNT-IMP: NORMAL
SODIUM SERPL-SCNC: 127 MMOL/L (ref 136–145)
SP GR UR REFRACTOMETRY: 1.02 (ref 1–1.03)
UROBILINOGEN UR QL STRIP.AUTO: 0.2 EU/DL (ref 0.2–1)
WBC # BLD AUTO: 8.4 K/UL (ref 3.6–11)
WBC URNS QL MICRO: ABNORMAL /HPF (ref 0–4)

## 2022-05-29 PROCEDURE — 82962 GLUCOSE BLOOD TEST: CPT

## 2022-05-29 PROCEDURE — 36415 COLL VENOUS BLD VENIPUNCTURE: CPT

## 2022-05-29 PROCEDURE — 93005 ELECTROCARDIOGRAM TRACING: CPT

## 2022-05-29 PROCEDURE — 85025 COMPLETE CBC W/AUTO DIFF WBC: CPT

## 2022-05-29 PROCEDURE — G0378 HOSPITAL OBSERVATION PER HR: HCPCS

## 2022-05-29 PROCEDURE — 71045 X-RAY EXAM CHEST 1 VIEW: CPT

## 2022-05-29 PROCEDURE — 70450 CT HEAD/BRAIN W/O DYE: CPT

## 2022-05-29 PROCEDURE — 81001 URINALYSIS AUTO W/SCOPE: CPT

## 2022-05-29 PROCEDURE — 99285 EMERGENCY DEPT VISIT HI MDM: CPT

## 2022-05-29 PROCEDURE — 83605 ASSAY OF LACTIC ACID: CPT

## 2022-05-29 PROCEDURE — 87086 URINE CULTURE/COLONY COUNT: CPT

## 2022-05-29 PROCEDURE — 80053 COMPREHEN METABOLIC PANEL: CPT

## 2022-05-29 RX ORDER — TRAZODONE HYDROCHLORIDE 50 MG/1
50 TABLET ORAL
Status: DISCONTINUED | OUTPATIENT
Start: 2022-05-29 | End: 2022-06-04

## 2022-05-29 RX ORDER — ONDANSETRON 2 MG/ML
4 INJECTION INTRAMUSCULAR; INTRAVENOUS
Status: DISCONTINUED | OUTPATIENT
Start: 2022-05-29 | End: 2022-06-09 | Stop reason: HOSPADM

## 2022-05-29 RX ORDER — ACETAMINOPHEN 325 MG/1
650 TABLET ORAL
Status: DISCONTINUED | OUTPATIENT
Start: 2022-05-29 | End: 2022-06-09 | Stop reason: HOSPADM

## 2022-05-29 RX ORDER — ALPRAZOLAM 0.5 MG/1
0.5 TABLET ORAL
Status: DISCONTINUED | OUTPATIENT
Start: 2022-05-29 | End: 2022-06-02 | Stop reason: DRUGHIGH

## 2022-05-29 RX ORDER — SODIUM CHLORIDE 9 MG/ML
100 INJECTION, SOLUTION INTRAVENOUS CONTINUOUS
Status: DISCONTINUED | OUTPATIENT
Start: 2022-05-29 | End: 2022-05-31

## 2022-05-29 RX ORDER — DILTIAZEM HYDROCHLORIDE 240 MG/1
240 CAPSULE, COATED, EXTENDED RELEASE ORAL DAILY
Status: DISCONTINUED | OUTPATIENT
Start: 2022-05-30 | End: 2022-05-30

## 2022-05-29 NOTE — Clinical Note
Patient Class[de-identified] OBSERVATION [706]   Type of Bed: Medical [8]   Cardiac Monitoring Required?: No   Reason for Observation: AMS   Admitting Diagnosis: AMS (altered mental status) [5953484]   Admitting Physician: Jeff Granger [[id number]   Attending Physician: Jeff Granger [[id number]]

## 2022-05-30 PROBLEM — N17.9 AKI (ACUTE KIDNEY INJURY) (HCC): Status: ACTIVE | Noted: 2022-05-30

## 2022-05-30 PROBLEM — U07.1 COVID-19: Status: ACTIVE | Noted: 2022-05-30

## 2022-05-30 PROBLEM — N39.0 RECURRENT UTI: Chronic | Status: ACTIVE | Noted: 2018-06-05

## 2022-05-30 LAB — HGB BLD-MCNC: 11.7 G/DL (ref 11.5–16)

## 2022-05-30 PROCEDURE — 36415 COLL VENOUS BLD VENIPUNCTURE: CPT

## 2022-05-30 PROCEDURE — 96372 THER/PROPH/DIAG INJ SC/IM: CPT

## 2022-05-30 PROCEDURE — 85018 HEMOGLOBIN: CPT

## 2022-05-30 PROCEDURE — G0378 HOSPITAL OBSERVATION PER HR: HCPCS

## 2022-05-30 PROCEDURE — 77030038269 HC DRN EXT URIN PURWCK BARD -A

## 2022-05-30 PROCEDURE — 74011250637 HC RX REV CODE- 250/637: Performed by: INTERNAL MEDICINE

## 2022-05-30 PROCEDURE — 74011250636 HC RX REV CODE- 250/636: Performed by: FAMILY MEDICINE

## 2022-05-30 PROCEDURE — 74011250636 HC RX REV CODE- 250/636: Performed by: INTERNAL MEDICINE

## 2022-05-30 PROCEDURE — 96374 THER/PROPH/DIAG INJ IV PUSH: CPT

## 2022-05-30 PROCEDURE — 74011250637 HC RX REV CODE- 250/637: Performed by: FAMILY MEDICINE

## 2022-05-30 RX ORDER — HALOPERIDOL 5 MG/ML
2 INJECTION INTRAMUSCULAR
Status: DISCONTINUED | OUTPATIENT
Start: 2022-05-30 | End: 2022-06-06

## 2022-05-30 RX ORDER — LEVOFLOXACIN 5 MG/ML
250 INJECTION, SOLUTION INTRAVENOUS ONCE
Status: DISCONTINUED | OUTPATIENT
Start: 2022-05-31 | End: 2022-05-30 | Stop reason: SDUPTHER

## 2022-05-30 RX ORDER — FAMOTIDINE 20 MG/1
20 TABLET, FILM COATED ORAL 2 TIMES DAILY
COMMUNITY
End: 2022-06-09

## 2022-05-30 RX ORDER — SODIUM CHLORIDE 0.9 % (FLUSH) 0.9 %
5-40 SYRINGE (ML) INJECTION EVERY 8 HOURS
Status: DISCONTINUED | OUTPATIENT
Start: 2022-05-30 | End: 2022-06-09 | Stop reason: HOSPADM

## 2022-05-30 RX ORDER — SULFAMETHOXAZOLE AND TRIMETHOPRIM 800; 160 MG/1; MG/1
1 TABLET ORAL
COMMUNITY
End: 2022-06-09

## 2022-05-30 RX ORDER — METOPROLOL TARTRATE 50 MG/1
TABLET ORAL 2 TIMES DAILY
Status: ON HOLD | COMMUNITY
End: 2022-06-09 | Stop reason: SDUPTHER

## 2022-05-30 RX ORDER — ENOXAPARIN SODIUM 100 MG/ML
30 INJECTION SUBCUTANEOUS DAILY
Status: DISCONTINUED | OUTPATIENT
Start: 2022-05-30 | End: 2022-05-31

## 2022-05-30 RX ORDER — FACIAL-BODY WIPES
10 EACH TOPICAL
COMMUNITY

## 2022-05-30 RX ORDER — ADHESIVE BANDAGE
30 BANDAGE TOPICAL AS NEEDED
COMMUNITY
End: 2022-06-09

## 2022-05-30 RX ORDER — ADHESIVE BANDAGE
30 BANDAGE TOPICAL DAILY PRN
Status: DISCONTINUED | OUTPATIENT
Start: 2022-05-30 | End: 2022-06-09 | Stop reason: HOSPADM

## 2022-05-30 RX ORDER — TRAZODONE HYDROCHLORIDE 50 MG/1
50 TABLET ORAL
Status: DISCONTINUED | OUTPATIENT
Start: 2022-05-30 | End: 2022-05-30

## 2022-05-30 RX ORDER — SODIUM CHLORIDE TAB 1 GM 1 G
1 TAB MISCELLANEOUS 3 TIMES DAILY
COMMUNITY
End: 2022-06-09

## 2022-05-30 RX ORDER — METOPROLOL TARTRATE 50 MG/1
50 TABLET ORAL 2 TIMES DAILY
Status: DISCONTINUED | OUTPATIENT
Start: 2022-05-30 | End: 2022-06-02

## 2022-05-30 RX ORDER — LIDOCAINE AND MENTHOL 40; 40 MG/1; MG/1
PATCH TOPICAL DAILY
COMMUNITY
End: 2022-06-09

## 2022-05-30 RX ORDER — POLYETHYLENE GLYCOL 400 AND PROPYLENE GLYCOL 4; 3 MG/ML; MG/ML
1 SOLUTION/ DROPS OPHTHALMIC AS NEEDED
Status: ON HOLD | COMMUNITY
End: 2022-06-09 | Stop reason: SDUPTHER

## 2022-05-30 RX ORDER — SODIUM CHLORIDE 0.9 % (FLUSH) 0.9 %
5-40 SYRINGE (ML) INJECTION AS NEEDED
Status: DISCONTINUED | OUTPATIENT
Start: 2022-05-30 | End: 2022-06-09 | Stop reason: HOSPADM

## 2022-05-30 RX ORDER — DULOXETIN HYDROCHLORIDE 20 MG/1
20 CAPSULE, DELAYED RELEASE ORAL DAILY
COMMUNITY
End: 2022-06-09

## 2022-05-30 RX ORDER — HYDROMORPHONE HYDROCHLORIDE 5 MG/5ML
SOLUTION ORAL
COMMUNITY
End: 2022-06-09

## 2022-05-30 RX ORDER — LEVOFLOXACIN 5 MG/ML
250 INJECTION, SOLUTION INTRAVENOUS EVERY 24 HOURS
Status: DISCONTINUED | OUTPATIENT
Start: 2022-05-31 | End: 2022-05-31

## 2022-05-30 RX ORDER — LEVOFLOXACIN 5 MG/ML
250 INJECTION, SOLUTION INTRAVENOUS ONCE
Status: COMPLETED | OUTPATIENT
Start: 2022-05-30 | End: 2022-05-30

## 2022-05-30 RX ORDER — VALACYCLOVIR HYDROCHLORIDE 1 G/1
1000 TABLET, FILM COATED ORAL
COMMUNITY
End: 2022-06-09

## 2022-05-30 RX ORDER — FLUTICASONE PROPIONATE 50 MCG
2 SPRAY, SUSPENSION (ML) NASAL
COMMUNITY
End: 2022-06-09

## 2022-05-30 RX ORDER — DILTIAZEM HYDROCHLORIDE 240 MG/1
240 CAPSULE, COATED, EXTENDED RELEASE ORAL DAILY
Status: DISCONTINUED | OUTPATIENT
Start: 2022-05-30 | End: 2022-05-30

## 2022-05-30 RX ORDER — PSEUDOEPHED/ACETAMINOPHEN/CPM 30-500-2MG
TABLET ORAL AS NEEDED
COMMUNITY
End: 2022-06-09

## 2022-05-30 RX ORDER — BISMUTH SUBSALICYLATE 262 MG/1
2 TABLET, CHEWABLE ORAL AS NEEDED
COMMUNITY
End: 2022-06-09

## 2022-05-30 RX ORDER — CHOLECALCIFEROL (VITAMIN D3) 125 MCG
CAPSULE ORAL DAILY
COMMUNITY
End: 2022-06-09

## 2022-05-30 RX ADMIN — SODIUM CHLORIDE 75 ML/HR: 9 INJECTION, SOLUTION INTRAVENOUS at 00:25

## 2022-05-30 RX ADMIN — ACETAMINOPHEN 650 MG: 325 TABLET ORAL at 22:19

## 2022-05-30 RX ADMIN — ALPRAZOLAM 0.5 MG: 0.5 TABLET ORAL at 06:58

## 2022-05-30 RX ADMIN — ENOXAPARIN SODIUM 30 MG: 100 INJECTION SUBCUTANEOUS at 10:00

## 2022-05-30 RX ADMIN — ALPRAZOLAM 0.5 MG: 0.5 TABLET ORAL at 17:44

## 2022-05-30 RX ADMIN — TRAZODONE HYDROCHLORIDE 50 MG: 50 TABLET ORAL at 22:19

## 2022-05-30 RX ADMIN — LEVOFLOXACIN 250 MG: 5 INJECTION, SOLUTION INTRAVENOUS at 12:30

## 2022-05-30 RX ADMIN — METOPROLOL TARTRATE 50 MG: 50 TABLET, FILM COATED ORAL at 18:45

## 2022-05-30 RX ADMIN — HALOPERIDOL LACTATE 2 MG: 5 INJECTION, SOLUTION INTRAMUSCULAR at 14:05

## 2022-05-30 NOTE — H&P
History and Physical  Primary Care Provider: Flaco Wang NP    Subjective: This is a 77-year-old female patient admitted overnight from the emergency department with a diagnosis of altered mental status. Patient has past medical history significant for hypertension, GERD, recurrent UTI, anxiety disorder and has recently been diagnosed with COVID on May 26 and has been on monoclonal antibody treatment. Patient was transferred from [facility], [facility] living due to altered mental status. As the patient's continued to be in altered mental status awake, nonverbal and not cooperative could not get history from the patient. Documentation is based on review of ED records and family's report. As mentioned below 'Gi Beebe is a 80 y.o. female, pmhx see below, who was brought to the ED by EMS because of alterred mental status. Staff and daughters report that she has been a lot different from her usual self this evening. She is usually very sharp and witty, but this afternoon she became acutely confused. Of note, the patient was diagnosed with COVID-19 last week and has just completed a course of monoclonal antibodies for treatment. She has not had a cough, fevers, or dyspnea, but rather fatigue and myalgias. No vomiting or diarrhea; her appetite has been diminished for the last several days. She has also been treated for a UTI over the last week. This evening, she became confused and trouble with her speech, so she was sent to the ED for evaluation.'     Review of Systems:  Unobtainable due to altered mental status.       Past Medical History:   Diagnosis Date    Arthritis     Cancer ([facility] [address]     skin ca removed from nose and leg    Chronic pain     left knee    GERD (gastroesophageal reflux disease)     Hypertension     Menopause     Other ill-defined conditions(799.89)     HIGH CHOLESTEROL    Recurrent UTI     TMJ (dislocation of temporomandibular joint)       Past Surgical History:   Procedure Laterality Date    HX GI      COLONOSCOPY    HX HEENT      BILATERAL CATARACTS,     HX HEENT Right     DETACHED RETINA     HX ORTHOPAEDIC Right 4/2013    TKR    HX TONSILLECTOMY  1940'S     Prior to Admission medications    Medication Sig Start Date End Date Taking? Authorizing Provider   traZODone (DESYREL) 50 mg tablet Take 1 Tab by mouth nightly. 11/1/19   Swetha Gibbs NP   ergocalciferol (VITAMIN D2) 50,000 unit capsule Take 1 Cap by mouth every seven (7) days. 9/4/12   Provider, Historical   aspirin-caffeine (BROCK BACK AND BODY) 500-32.5 mg tab Take  by mouth two (2) times daily as needed. Brock Back and Body -Takes two tabs Q AM and 2 Q PM.    Provider, Historical   VITAMIN B-12 1,000 mcg/mL injection 1 mL by IntraMUSCular route every month. 8/7/19   Swetha Gibbs NP   acetaminophen (TYLENOL ARTHRITIS PAIN) 650 mg TbER Take 650 mg by mouth every eight (8) hours. Provider, Historical   dilTIAZem CD (CARDIZEM CD) 240 mg ER capsule Take 1 Cap by mouth daily. 7/11/19   Swetha Gibbs NP   triamcinolone (NASACORT) 55 mcg nasal inhaler 2 Sprays daily as needed. Provider, Historical   melatonin 5 mg cap capsule Take 5 mg by mouth nightly. Provider, Historical   Cetirizine (ZYRTEC) 10 mg cap Take 1 Tab by mouth daily as needed. 10/17/18   Swetha Gibbs NP   ibuprofen 200 mg cap Take 200 mg by mouth every six (6) hours as needed for Pain. Provider, Historical   D-MANNOSE PO Take  by mouth daily as needed (for prevention of UTIs). Provider, Historical   cranberry fruit extract (THERACRAN PO) Take  by mouth daily as needed. Provider, Historical   ALPRAZolam (XANAX) 0.25 mg tablet Take 0.5 Tabs by mouth two (2) times daily as needed for Anxiety. Max Daily Amount: 0.25 mg. 10/5/18   Swetha Gibbs NP   acetaminophen (TYLENOL EXTRA STRENGTH) 500 mg tablet Take 1,000 mg by mouth every six (6) hours as needed for Pain.     Provider, Historical     Allergies   Allergen Reactions    Other Food Nausea and Vomiting     CANTELOUPE    Ciprofloxacin Rash    Duloxetine Diarrhea and Nausea and Vomiting    Augmentin [Amoxicillin-Pot Clavulanate] Other (comments)     DOES NOT KNOW REACTION      Gabapentin Other (comments)    Penicillins Other (comments)     DOES NOT KNOW REACTION    Codeine Other (comments)     Lower my blood pressure      Family History   Problem Relation Age of Onset    Cancer Mother         BREAST    Stroke Mother         3 X    Breast Cancer Mother     Cancer Father         BRAIN    Heart Disease Brother     Heart Attack Brother     Breast Cancer Daughter     Anesth Problems Neg Hx         SOCIAL HISTORY:  Patient resides at [facility]. Patient ambulates with Legs  Smoking history: None  Alcohol history: None    Objective:       Physical Exam:   Visit Vitals  /63 (BP 1 Location: Left upper arm, BP Patient Position: Lying; At rest)   Pulse 77   Temp 98 °F (36.7 °C)   Resp 16   Wt 74.8 kg (165 lb)   SpO2 99%   Breastfeeding No   BMI 27.46 kg/m²     General:   Patient sleepy wakes to verbal stimuli. Noncooperative and does not follow command. Head:  Normocephalic, without obvious abnormality, atraumatic. Eyes:  Conjunctivae/corneas clear. PERRL, EOMs intact. Fundi benign. Nose: Nares normal. Septum midline. Mucosa normal. No drainage or sinus tenderness. Throat: Lips, mucosa, and tongue normal. Teeth and gums normal.   Neck: Supple, symmetrical, trachea midline, no adenopathy, thyroid: no enlargement/tenderness/nodules, no carotid bruit and no JVD. Back:   Symmetric, no curvature. ROM normal. No CVA tenderness. Lungs:   Clear to auscultation bilaterally. Chest wall:  No tenderness or deformity. Heart:  Regular rate and rhythm, S1, S2 normal, no murmur, click, rub or gallop. Abdomen:   Soft, non-tender. Bowel sounds normal. No masses,  No organomegaly.            Extremities: Extremities normal, atraumatic, has bruises on the upper and lower extremity including forearm and legs. No cyanosis or edema. Pulses: 2+ and symmetric all extremities. Skin: Skin color, texture, turgor normal. No rashes or lesions. Lymph nodes: Cervical, supraclavicular, and axillary nodes normal.   Neurologic:  Awake noncooperative cannot do cranial nerve and sensorimotor examination. No gross neurologic deficits noted. ECG:   Sinus rhythm with premature atrial complexes   Moderate voltage criteria for LVH, may be normal variant     Data Review: All diagnostic labs and studies have been reviewed. EXAM:  CT HEAD WO CONT     INDICATION: Altered mental status     COMPARISON: CT 3/31/2022     TECHNIQUE: Axial noncontrast head CT from foramen magnum to vertex. Coronal and  sagittal reformatted images were obtained. CT dose reduction was achieved  through use of a standardized protocol tailored for this examination and  automatic exposure control for dose modulation.     FINDINGS: Motion limits evaluation. There is diffuse age-related parenchymal  volume loss. The ventricles and sulci are age-appropriate without hydrocephalus. There is no mass effect or midline shift. There is no intracranial hemorrhage or  extra-axial fluid collection. Areas of low attenuation in the periventricular  white matter represent stable chronic microvascular ischemic changes. The  gray-white matter differentiation is maintained. The basal cisterns are patent.     The osseous structures are intact. The visualized paranasal sinuses and mastoid  air cells are clear.     IMPRESSION  No acute intracranial abnormality. Chest x-ray: No acute process. Assessment: This is a 51-year-old female patient admitted overnight from the emergency department with a diagnosis of altered mental status.   Patient has past medical history significant for hypertension, GERD, recurrent UTI, anxiety disorder and has recently been diagnosed with COVID on May 26 and has been on monoclonal antibody treatment. Patient was transferred from [facility] living due to altered mental status. As the patient's continued to be in altered mental status awake, nonverbal and not cooperative could not get history from the patient. Documentation is based on review of ED records and family's report. As mentioned below 'Vance Tate is a 80 y.o. female, pmhx see below, who was brought to the ED by EMS because of alterred mental status. Staff and daughters report that she has been a lot different from her usual self this evening. She is usually very sharp and witty, but this afternoon she became acutely confused. Of note, the patient was diagnosed with COVID-19 last week and has just completed a course of monoclonal antibodies for treatment. She has not had a cough, fevers, or dyspnea, but rather fatigue and myalgias. No vomiting or diarrhea; her appetite has been diminished for the last several days. She has also been treated for a UTI over the last week. This evening, she became confused and trouble with her speech, so she was sent to the ED for evaluation.'   The patient on presentation to the ED sister remained in altered mental status conscious but not cooperative and agitated at times. Her vital signs on presentation were temperature 98, pulse rate 53, respiratory 16 and oxygen saturation 90% at room air blood pressure 108/72. Labs done on presentation in the ED were significant for WBC 8.4, hemoglobin 11.3 and platelet 806. BMP significant for sodium 127, patient has chronic hyponatremia baseline average sodium sodium 130. Potassium 4.1, BUN 19 and creatinine 1.35. Baseline creatinine was 0.58 and GFR above 60. Random blood glucose was 98. Lactate was 1 her urinalysis significant for WBC 5-10, RBC 0-5 and bacteria positive. CAT scan of the head done on presentation no acute intracranial abnormality no infarct or hemorrhage reported.       The  Patients daughter who is the next of kin  was in the hospital , the patients situation was discussed from the time she has been to the assisted living since 7 weeks after she had an episode of fall and discharged from hospital to the [address]. The  Patient since then was relatively well but has shown recent change in mentation  Since 1 week after she has the COVID   infection while in the assisted living even if she already took all the COVID vaccines including the booster. Principal Problem:    AMS (altered mental status) (5/29/2022)    Active Problems:    Recurrent UTI (6/5/2018)      COVID-19 (5/30/2022)      LISBET (acute kidney injury) ([facility] [address] (5/30/2022)    History of hypertension  History of anxiety disorder    Plan:   :  Altered mental status  Based on the history,physical exam, laboratory and imaging all which are non revealing to give  clear explanation about the reason for her altered mental status. Likely cause is recovery from acute infection COVID, occult infection r/o UTI and dementia. We will continue to monitor  Cardiac monitoring  IV fluids maintenance  Levaquin 250 mg IV daily  Follow urine blood cultures  Neurochecks  Fall precautions  Haldol 1 mg IV every 6 hours as needed for agitation's  Avoid benzodizepined when possible     Recurrent UTI   Continue Levaquin  Follow results of urine culture    Recent  COVID-19 infection and treatment Hx[de-identified] Patient based on reports from the assisted living and her daughter  was diagnosed with COVID on May 26. She has already completed monoclonal antibodies. Currently patient is not in respiratory distress. She has no records of fever other vital signs are stable.   We will continue to observe and follow isolation protocols    Acute kidney injury  Based on review of her previous lab patient has perfectly normal kidney function  On the results seen in the ED patient seems to have had acute kidney injury  IV hydration with normal saline  Will monitor renal function  Avoid nephrotoxic medications    Hyponatremia her Na is 127. Likely from dehydration.   Start Normal saline at 75 ml/hr  Monitor BMP  Follow and correct Se Na accordingly  Check Urine electrolytes    History of hypertension  Continue to monitor blood pressure  Continue with Cardizem  mg p.o. daily    History of anxiety disorder  Continue with trazodone  Continue with Xanax 0.5 mg p.o. twice daily as needed    Safety  CODE STATUS DNR  GI prophylaxis Pepcid 20 mg IV daily  DVT prophylaxis Lovenox 30 mg 7 times daily  Disposition back to the assisted living  Next of kin/family contact:  Ct Hughes (Daughter)   [phone] ([place])     Signed By: Petros Parham MD     May 30, 2022

## 2022-05-30 NOTE — ED NOTES
Report given to [facility], all questions answered. Patient transported to , condition unchanged. NAD noted when leaving department.

## 2022-05-30 NOTE — ROUTINE PROCESS
Bedside and Verbal shift change report given to ARNOLDO Ponce LPN (oncoming nurse) by Eddie Lopez RN (offgoing nurse). Report included the following information SBAR, ED Summary, MAR and Recent Results. Sutton score 4  Bed/chair alarm is in use. If in use it is set at the highest volume. Intravenous fluids were administered, normal saline 100 ml/hr  Patient Vitals for the past 12 hrs:   Temp Pulse Resp BP SpO2   05/30/22 1513 97 °F (36.1 °C) 77 20 (!) 158/65 97 %   05/30/22 0800 98 °F (36.7 °C) 77 16 132/63 99 %     No flowsheet data found. All lab results for the last 24 hours reviewed.

## 2022-05-30 NOTE — PROGRESS NOTES
Received PT referral and chart reviewed. Spoke with Kirt Raines RN. Patient currently sleeping soundly, she had been confused and restless this morning. PT evaluation deferred until tomorrow, 5/31/22.

## 2022-05-30 NOTE — PROGRESS NOTES
Problem: Falls - Risk of  Goal: *Absence of Falls  Description: Document [place] Fall Risk and appropriate interventions in the flowsheet. Outcome: Progressing Towards Goal  Note: Fall Risk Interventions:  Mobility Interventions: Bed/chair exit alarm,PT Consult for mobility concerns    Mentation Interventions: Adequate sleep, hydration, pain control,Bed/chair exit alarm,More frequent rounding,Reorient patient,Room close to nurse's station,Toileting rounds    Medication Interventions: Bed/chair exit alarm,Patient to call before getting OOB,Teach patient to arise slowly    Elimination Interventions: Bed/chair exit alarm,Call light in reach,Patient to call for help with toileting needs              Problem: Hypertension  Goal: *Blood pressure within specified parameters  Outcome: Progressing Towards Goal  Goal: *Fluid volume balance  Outcome: Progressing Towards Goal  Goal: *Labs within defined limits  Outcome: Progressing Towards Goal     Problem: Patient Education: Go to Patient Education Activity  Goal: Patient/Family Education  Outcome: Progressing Towards Goal     Problem: Airway Clearance - Ineffective  Goal: Achieve or maintain patent airway  Outcome: Progressing Towards Goal     Problem: Gas Exchange - Impaired  Goal: Absence of hypoxia  Outcome: Progressing Towards Goal  Goal: Promote optimal lung function  Outcome: Progressing Towards Goal     Problem: Breathing Pattern - Ineffective  Goal: Ability to achieve and maintain a regular respiratory rate  Outcome: Progressing Towards Goal     Problem:  Body Temperature -  Risk of, Imbalanced  Goal: Ability to maintain a body temperature within defined limits  Outcome: Progressing Towards Goal  Goal: Will regain or maintain usual level of consciousness  Outcome: Progressing Towards Goal  Goal: Complications related to the disease process, condition or treatment will be avoided or minimized  Outcome: Progressing Towards Goal     Problem: Isolation Precautions - Risk of Spread of Infection  Goal: Prevent transmission of infectious organism to others  Outcome: Progressing Towards Goal     Problem: Nutrition Deficits  Goal: Optimize nutrtional status  Outcome: Progressing Towards Goal     Problem: Risk for Fluid Volume Deficit  Goal: Maintain normal heart rhythm  Outcome: Progressing Towards Goal  Goal: Maintain absence of muscle cramping  Outcome: Progressing Towards Goal  Goal: Maintain normal serum potassium, sodium, calcium, phosphorus, and pH  Outcome: Progressing Towards Goal     Problem: Loneliness or Risk for Loneliness  Goal: Demonstrate positive use of time alone when socialization is not possible  Outcome: Progressing Towards Goal     Problem: Fatigue  Goal: Verbalize increase energy and improved vitality  Outcome: Progressing Towards Goal     Problem: Patient Education: Go to Patient Education Activity  Goal: Patient/Family Education  Outcome: Progressing Towards Goal     Problem: Urinary Tract Infection - Adult  Goal: *Absence of infection signs and symptoms  Outcome: Progressing Towards Goal     Problem: Patient Education: Go to Patient Education Activity  Goal: Patient/Family Education  Outcome: Progressing Towards Goal

## 2022-05-30 NOTE — PROGRESS NOTES
Care Management Interventions  PCP Verified by CM: Yes (Followed regularly by clinicians at [facility])  Palliative Care Criteria Met (RRAT>21 & CHF Dx)?: No  Mode of Transport at Discharge: Other (see comment) (TBD)  Transition of Care Consult (CM Consult): Discharge Planning,Assisted Living ([facility]  MyChart Signup: No  Discharge Durable Medical Equipment: No  Physical Therapy Consult: Yes  Occupational Therapy Consult: Yes  Speech Therapy Consult: No  Support Systems: Child(lou),Long Term Care Facility,Assisted Living  Confirm Follow Up Transport: Other (see comment) (TBD)   Resource Information Provided?: No  Discharge Location  Patient Expects to be Discharged to[de-identified] [facility]     Ms. Etelvina Dorado was admitted under Observation Status with Altered Mental Status 5/29/22. She came here from [facility], a [facility] with three levels of care in [place]. She is in the assisted living section. I spoke with her nurse there Nikki Robles, [phone]. Ms. Etelvina Dorado is not able to participate in the CM intake. I left a voice mail message for daughter Sigrid Engle, [phone]. She is who [facility] staff contact when needed. The MOON needs to be obtained. Ms. Etelvina Dorado will be returning to [facility] at discharge. There is no copy of the Advance Directive on file. Advance Care Planning :Apparently there is an advance directive but there is not a copy on file. One can be requested from family members when contact is made. Ms. Etelvina Dorado is a DNR.     Reason for Admission:  AMS                     RUR Score: N/A Observation         RRAT: 7 LOW               Plan for utilizing home health:  NO        PCP: First and Last name:  Kaleb Hong NP     [facility]   Are you a current patient: Yes/No: YES   Approximate date of last visit: followed regularly at the facility   Can you participate in a virtual visit with your PCP:           NO            Current Advanced Directive/Advance Care Plan: DNR      Healthcare Decision Maker:   Click here to complete [address] including selection of the Healthcare Decision Maker Relationship (ie \"Primary\")                          Transition of Care Plan:  [facility]

## 2022-05-30 NOTE — PROGRESS NOTES
Bedside and Verbal shift change report given to Petey Alexandre RN (oncoming nurse) by Trent Hughes RN   (offgoing nurse). Report included the following information SBAR and Kardex.

## 2022-05-30 NOTE — PROGRESS NOTES
I met with daughter Kayden Lockwood. I explained the DOWELL and document signed and received. Copy to patient/family and copy to chart. Also I requested a copy of the Advance Directive.

## 2022-05-30 NOTE — ROUTINE PROCESS
Found with gown off and telemetry off and IV pulled out again. Reapplied telemetry and IV site restarted.

## 2022-05-30 NOTE — ED PROVIDER NOTES
EMERGENCY DEPARTMENT HISTORY AND PHYSICAL EXAM          Date: 5/29/2022  Patient Name: Oliva Adams    History of Presenting Illness     Chief Complaint   Patient presents with    Altered mental status       History Provided By: Patient and Patient's Daughter and Nursing home staff    HPI: Oliva Adams is a 80 y.o. female, pmhx see below, who was brought to the ED by EMS because of alterred mental status. Staff and daughters report that she has been a lot different from her usual self this evening. She is usually very sharp and witty, but this afternoon she became acutely confused. Of note, the patient was diagnosed with COVID-19 last week and has just completed a course of monoclonal antibodies for treatment. She has not had a cough, fevers, or dyspnea, but rather fatigue and myalgias. No vomiting or diarrhea; her appetite has been diminished for the last several days. She has also been treated for a UTI over the last week. This evening, she became confused and trouble with her speech, so she was sent to the ED for evaluation. PCP: Varsha Hahn NP    Allergies: see list  Social Hx:  No tobacco, 1-2 glasses of wine/day ; Lives at [facility]. There are no other complaints, changes, or physical findings at this time.      Current Facility-Administered Medications   Medication Dose Route Frequency Provider Last Rate Last Admin    acetaminophen (TYLENOL) tablet 650 mg  650 mg Oral Q6H PRN Benito Carranza MD        ondansetron [facility]) injection 4 mg  4 mg IntraVENous Q4H PRN Benito Carranza MD        traZODone (DESYREL) tablet 50 mg  50 mg Oral Niko Cohen MD        ALPRAZolam Yudith Bullard) tablet 0.5 mg  0.5 mg Oral BID PRN Benito Carranza MD        dilTIAZem ER (CARDIZEM CD) capsule 240 mg  240 mg Oral DAILY Benito Carranza MD        0.9% sodium chloride infusion  75 mL/hr IntraVENous CONTINUOUS Benito Carranza MD 75 mL/hr at 05/30/22 0025 75 mL/hr at 05/30/22 0025       Past History     Past Medical History:  Past Medical History:   Diagnosis Date    Arthritis     Cancer ([facility] [address]     skin ca removed from nose and leg    Chronic pain     left knee    GERD (gastroesophageal reflux disease)     Hypertension     Menopause     Other ill-defined conditions(799.89)     HIGH CHOLESTEROL    Recurrent UTI     TMJ (dislocation of temporomandibular joint)        Past Surgical History:  Past Surgical History:   Procedure Laterality Date    HX GI      COLONOSCOPY    HX HEENT      BILATERAL CATARACTS,     HX HEENT Right     DETACHED RETINA     HX ORTHOPAEDIC Right 2013    TKR    HX TONSILLECTOMY  0'S       Family History:  Family History   Problem Relation Age of Onset    Cancer Mother         BREAST    Stroke Mother         3 X    Breast Cancer Mother     Cancer Father         BRAIN    Heart Disease Brother     Heart Attack Brother     Breast Cancer Daughter     Anesth Problems Neg Hx        Social History:  Social History     Tobacco Use    Smoking status: Former Smoker     Packs/day: 0.25     Years: 20.00     Pack years: 5.00     Quit date: 1987     Years since quittin.1    Smokeless tobacco: Never Used   Substance Use Topics    Alcohol use: Yes     Alcohol/week: 0.0 standard drinks     Types: 7 - 14 Glasses of wine per week    Drug use: No       Allergies: Allergies   Allergen Reactions    Other Food Nausea and Vomiting     CANTELOUPE    Ciprofloxacin Rash    Duloxetine Diarrhea and Nausea and Vomiting    Augmentin [Amoxicillin-Pot Clavulanate] Other (comments)     DOES NOT KNOW REACTION      Gabapentin Other (comments)    Penicillins Other (comments)     DOES NOT KNOW REACTION    Codeine Other (comments)     Lower my blood pressure         Review of Systems   Review of Systems   Unable to perform ROS: Mental status change       Physical Exam   Physical Exam  Vitals reviewed. Constitutional:       General: She is not in acute distress.      Appearance: She is well-developed. She is not diaphoretic. HENT:      Head: Normocephalic and atraumatic. Right Ear: External ear normal.      Left Ear: External ear normal.      Nose: Nose normal.   Eyes:      General: No scleral icterus. Right eye: No discharge. Left eye: No discharge. Extraocular Movements:      Right eye: Normal extraocular motion. Left eye: Normal extraocular motion. Conjunctiva/sclera: Conjunctivae normal.      Pupils: Pupils are equal, round, and reactive to light. Neck:      Thyroid: No thyromegaly. Trachea: No tracheal deviation. Cardiovascular:      Rate and Rhythm: Normal rate and regular rhythm. Heart sounds: Murmur heard. No friction rub. No gallop. Pulmonary:      Effort: Pulmonary effort is normal. No respiratory distress. Breath sounds: Normal breath sounds. No wheezing or rales. Chest:      Chest wall: No tenderness. Abdominal:      General: Bowel sounds are normal. There is no distension. Palpations: Abdomen is soft. Tenderness: There is no abdominal tenderness. There is no guarding or rebound. Musculoskeletal:         General: No tenderness or deformity. Normal range of motion. Cervical back: Normal range of motion and neck supple. Skin:     General: Skin is warm and dry. Neurological:      Mental Status: She is alert. She is confused. Cranial Nerves: No dysarthria or facial asymmetry. Sensory: No sensory deficit. Motor: No weakness. Coordination: Coordination normal.      Deep Tendon Reflexes: Reflexes are normal and symmetric. Comments: Seems to know daughters. Psychiatric:         Behavior: Behavior is agitated.          Diagnostic Study Results     Labs -     Recent Results (from the past 12 hour(s))   GLUCOSE, POC    Collection Time: 05/29/22  8:23 PM   Result Value Ref Range    Glucose (POC) 112 65 - 117 mg/dL    Performed by Kostas Dubon    CBC WITH AUTOMATED DIFF    Collection Time: 05/29/22  8:37 PM   Result Value Ref Range    WBC 8.4 3.6 - 11.0 K/uL    RBC 3.78 (L) 3.80 - 5.20 M/uL    HGB 11.3 (L) 11.5 - 16.0 g/dL    HCT 32.0 (L) 35.0 - 47.0 %    MCV 84.7 80.0 - 99.0 FL    MCH 29.9 26.0 - 34.0 PG    MCHC 35.3 30.0 - 36.5 g/dL    RDW 12.8 11.5 - 14.5 %    PLATELET 175 487 - 764 K/uL    MPV 12.6 8.9 - 12.9 FL    NRBC 0.0 0  WBC    ABSOLUTE NRBC 0.00 0.00 - 0.01 K/uL    NEUTROPHILS 64 32 - 75 %    LYMPHOCYTES 18 12 - 49 %    MONOCYTES 14 (H) 5 - 13 %    EOSINOPHILS 1 0 - 7 %    BASOPHILS 1 0 - 1 %    IMMATURE GRANULOCYTES 2 (H) 0.0 - 0.5 %    ABS. NEUTROPHILS 5.4 1.8 - 8.0 K/UL    ABS. LYMPHOCYTES 1.5 0.8 - 3.5 K/UL    ABS. MONOCYTES 1.2 (H) 0.0 - 1.0 K/UL    ABS. EOSINOPHILS 0.1 0.0 - 0.4 K/UL    ABS. BASOPHILS 0.1 0.0 - 0.1 K/UL    ABS. IMM. GRANS. 0.1 (H) 0.00 - 0.04 K/UL    DF AUTOMATED     METABOLIC PANEL, COMPREHENSIVE    Collection Time: 05/29/22  8:37 PM   Result Value Ref Range    Sodium 127 (L) 136 - 145 mmol/L    Potassium 4.1 3.5 - 5.1 mmol/L    Chloride 93 (L) 97 - 108 mmol/L    CO2 22 21 - 32 mmol/L    Anion gap 12 5 - 15 mmol/L    Glucose 98 65 - 100 mg/dL    BUN 19 6 - 20 MG/DL    Creatinine 1.35 (H) 0.55 - 1.02 MG/DL    BUN/Creatinine ratio 14 12 - 20      GFR est AA 45 (L) >60 ml/min/1.73m2    GFR est non-AA 37 (L) >60 ml/min/1.73m2    Calcium 9.0 8.5 - 10.1 MG/DL    Bilirubin, total 0.5 0.2 - 1.0 MG/DL    ALT (SGPT) 25 12 - 78 U/L    AST (SGOT) 29 15 - 37 U/L    Alk.  phosphatase 69 45 - 117 U/L    Protein, total 6.6 6.4 - 8.2 g/dL    Albumin 3.5 3.5 - 5.0 g/dL    Globulin 3.1 2.0 - 4.0 g/dL    A-G Ratio 1.1 1.1 - 2.2     LACTIC ACID    Collection Time: 05/29/22  8:37 PM   Result Value Ref Range    Lactic acid 1.0 0.4 - 2.0 MMOL/L   URINALYSIS W/MICROSCOPIC    Collection Time: 05/29/22  9:09 PM   Result Value Ref Range    Color YELLOW/STRAW      Appearance CLEAR CLEAR      Specific gravity 1.025 1.003 - 1.030      pH (UA) 6.0 5.0 - 8.0      Protein TRACE (A) NEG mg/dL    Glucose Negative NEG mg/dL    Ketone TRACE (A) NEG mg/dL    Bilirubin Negative NEG      Blood TRACE (A) NEG      Urobilinogen 0.2 0.2 - 1.0 EU/dL    Nitrites Negative NEG      Leukocyte Esterase Negative NEG      WBC 5-10 0 - 4 /hpf    RBC 0-5 0 - 5 /hpf    Epithelial cells FEW FEW /lpf    Bacteria 1+ (A) NEG /hpf   EKG, 12 LEAD, INITIAL    Collection Time: 05/29/22  9:13 PM   Result Value Ref Range    Ventricular Rate 64 BPM    Atrial Rate 64 BPM    P-R Interval 200 ms    QRS Duration 70 ms    Q-T Interval 424 ms    QTC Calculation (Bezet) 437 ms    Calculated P Axis 32 degrees    Calculated R Axis 38 degrees    Calculated T Axis -7 degrees    Diagnosis       Sinus rhythm with premature atrial complexes  Moderate voltage criteria for LVH, may be normal variant  T wave abnormality, consider inferior ischemia  Abnormal ECG  When compared with ECG of 22-JUL-2014 12:13,  premature atrial complexes are now present         Radiologic Studies -   CT HEAD WO CONT   Final Result   No acute intracranial abnormality. XR CHEST SNGL V   Final Result   No acute process. CT Results  (Last 48 hours)               05/29/22 2221  CT HEAD WO CONT Final result    Impression:  No acute intracranial abnormality. Narrative:  EXAM:  CT HEAD WO CONT       INDICATION: Altered mental status       COMPARISON: CT 3/31/2022       TECHNIQUE: Axial noncontrast head CT from foramen magnum to vertex. Coronal and   sagittal reformatted images were obtained. CT dose reduction was achieved   through use of a standardized protocol tailored for this examination and   automatic exposure control for dose modulation. FINDINGS: Motion limits evaluation. There is diffuse age-related parenchymal   volume loss. The ventricles and sulci are age-appropriate without hydrocephalus. There is no mass effect or midline shift. There is no intracranial hemorrhage or   extra-axial fluid collection.  Areas of low attenuation in the periventricular   white matter represent stable chronic microvascular ischemic changes. The   gray-white matter differentiation is maintained. The basal cisterns are patent. The osseous structures are intact. The visualized paranasal sinuses and mastoid   air cells are clear. CXR Results  (Last 48 hours)               05/29/22 2150  XR CHEST SNGL V Final result    Impression:  No acute process. Narrative:  EXAM:  XR CHEST SNGL V       INDICATION: Altered mental status       COMPARISON: None. TECHNIQUE: Portable AP semiupright chest view at 2137 hours       FINDINGS: The cardiomediastinal contours are within normal limits. The lungs and   pleural spaces are clear. There is no pneumothorax. There are degenerative   changes in the spine and shoulders. The upper abdomen is unremarkable. Medical Decision Making   I am the first provider for this patient. I reviewed the vital signs, available nursing notes, past medical history, past surgical history, family history and social history. Vital Signs-Reviewed the patient's vital signs. Patient Vitals for the past 12 hrs:   Temp Pulse Resp BP SpO2   05/30/22 0010 98 °F (36.7 °C) (!) 58 16 (!) 145/63 98 %   05/30/22 0001 98.2 °F (36.8 °C) 81 20 (!) 171/84 98 %   05/29/22 2202    (!) 163/73 98 %   05/29/22 2140     96 %   05/29/22 2132     95 %   05/29/22 2118     94 %   05/29/22 2115     92 %   05/29/22 2049   22  94 %   05/29/22 2046  63  (!) 119/53    05/29/22 2039  60 17 117/64 98 %   05/29/22 2025 98.1 °F (36.7 °C) (!) 53 18 108/72 96 %       Pulse Oximetry Analysis - 98% on RA    Records Reviewed: Nursing Notes, Old Medical Records, Previous electrocardiograms, Previous Radiology Studies and Previous Laboratory Studies    Provider Notes (Medical Decision Making):   MDM: Elderly woman with AMS. Will need to rule out UTI, vs pneumonia vs cerebral hemorrhage.  Most likely COVID from last week. ED Course:   Initial assessment performed. The patients presenting problems have been discussed, and they are in agreement with the care plan formulated and outlined with them. I have encouraged them to ask questions as they arise throughout their visit. EKG interpretation: (Preliminary)  Rhythm: NSR, PAC's LVH. This EKG was interpreted by ED Provider Dr Jerri Pozo MD      PROGRESS NOTE:  Pt had no complaints, but she was agitated and did not want to lie still in bed. After several minutes she was wrapped in a warm blanket and she rested more quietly. CT scan was negative for acute bleed. Her daughters visited and agreed that she was much different from her usual sharp baseline. She exhibited no signs of stroke while she was here. Her case was discussed with Dr. Satya Myers, who agreed with admitting her to the medicine floor. Daughters stated that she has a living will.         Critical Care Time: 0      Diagnosis     Clinical Impression: Altered mental status                                     COVID-19        PLAN:    Admit to Med/Surg

## 2022-05-30 NOTE — ROUTINE PROCESS
Found with gown off and had pulled IV out. Restarted with a #22 in the right hand. Medicated with Haldol IM per order.  Telemetry on as ordered

## 2022-05-31 PROBLEM — R41.82 ALTERED MENTAL STATUS: Status: ACTIVE | Noted: 2022-05-31

## 2022-05-31 LAB
ALBUMIN SERPL-MCNC: 3.3 G/DL (ref 3.5–5)
ALBUMIN/GLOB SERPL: 1 {RATIO} (ref 1.1–2.2)
ALP SERPL-CCNC: 75 U/L (ref 45–117)
ALT SERPL-CCNC: 31 U/L (ref 12–78)
ANION GAP SERPL CALC-SCNC: 13 MMOL/L (ref 5–15)
ANION GAP SERPL CALC-SCNC: 14 MMOL/L (ref 5–15)
AST SERPL-CCNC: 39 U/L (ref 15–37)
BACTERIA SPEC CULT: NORMAL
BASOPHILS # BLD: 0.1 K/UL (ref 0–0.1)
BASOPHILS NFR BLD: 1 % (ref 0–1)
BILIRUB SERPL-MCNC: 0.7 MG/DL (ref 0.2–1)
BUN SERPL-MCNC: 9 MG/DL (ref 6–20)
BUN SERPL-MCNC: 9 MG/DL (ref 6–20)
BUN/CREAT SERPL: 12 (ref 12–20)
BUN/CREAT SERPL: 13 (ref 12–20)
CALCIUM SERPL-MCNC: 8.7 MG/DL (ref 8.5–10.1)
CALCIUM SERPL-MCNC: 8.8 MG/DL (ref 8.5–10.1)
CHLORIDE SERPL-SCNC: 94 MMOL/L (ref 97–108)
CHLORIDE SERPL-SCNC: 95 MMOL/L (ref 97–108)
CO2 SERPL-SCNC: 21 MMOL/L (ref 21–32)
CO2 SERPL-SCNC: 22 MMOL/L (ref 21–32)
CREAT SERPL-MCNC: 0.72 MG/DL (ref 0.55–1.02)
CREAT SERPL-MCNC: 0.75 MG/DL (ref 0.55–1.02)
DIFFERENTIAL METHOD BLD: ABNORMAL
EOSINOPHIL # BLD: 0.2 K/UL (ref 0–0.4)
EOSINOPHIL NFR BLD: 2 % (ref 0–7)
ERYTHROCYTE [DISTWIDTH] IN BLOOD BY AUTOMATED COUNT: 12.7 % (ref 11.5–14.5)
GLOBULIN SER CALC-MCNC: 3.3 G/DL (ref 2–4)
GLUCOSE SERPL-MCNC: 76 MG/DL (ref 65–100)
GLUCOSE SERPL-MCNC: 77 MG/DL (ref 65–100)
HCT VFR BLD AUTO: 35.9 % (ref 35–47)
HGB BLD-MCNC: 11.7 G/DL (ref 11.5–16)
HGB BLD-MCNC: 11.7 G/DL (ref 11.5–16)
HGB BLD-MCNC: 12.7 G/DL (ref 11.5–16)
IMM GRANULOCYTES # BLD AUTO: 0.1 K/UL (ref 0–0.04)
IMM GRANULOCYTES NFR BLD AUTO: 1 % (ref 0–0.5)
LYMPHOCYTES # BLD: 2 K/UL (ref 0.8–3.5)
LYMPHOCYTES NFR BLD: 25 % (ref 12–49)
MAGNESIUM SERPL-MCNC: 1.6 MG/DL (ref 1.6–2.4)
MCH RBC QN AUTO: 30.2 PG (ref 26–34)
MCHC RBC AUTO-ENTMCNC: 35.4 G/DL (ref 30–36.5)
MCV RBC AUTO: 85.3 FL (ref 80–99)
MONOCYTES # BLD: 1.2 K/UL (ref 0–1)
MONOCYTES NFR BLD: 15 % (ref 5–13)
NEUTS SEG # BLD: 4.2 K/UL (ref 1.8–8)
NEUTS SEG NFR BLD: 56 % (ref 32–75)
NRBC # BLD: 0 K/UL (ref 0–0.01)
NRBC BLD-RTO: 0 PER 100 WBC
PLATELET # BLD AUTO: 238 K/UL (ref 150–400)
PLATELET COMMENTS,PCOM: ABNORMAL
PMV BLD AUTO: 13.1 FL (ref 8.9–12.9)
POTASSIUM SERPL-SCNC: 3.8 MMOL/L (ref 3.5–5.1)
POTASSIUM SERPL-SCNC: 3.8 MMOL/L (ref 3.5–5.1)
PROT SERPL-MCNC: 6.6 G/DL (ref 6.4–8.2)
RBC # BLD AUTO: 4.21 M/UL (ref 3.8–5.2)
RBC MORPH BLD: ABNORMAL
SERVICE CMNT-IMP: NORMAL
SODIUM SERPL-SCNC: 129 MMOL/L (ref 136–145)
SODIUM SERPL-SCNC: 130 MMOL/L (ref 136–145)
WBC # BLD AUTO: 7.8 K/UL (ref 3.6–11)

## 2022-05-31 PROCEDURE — 83735 ASSAY OF MAGNESIUM: CPT

## 2022-05-31 PROCEDURE — 74011250637 HC RX REV CODE- 250/637: Performed by: INTERNAL MEDICINE

## 2022-05-31 PROCEDURE — 96372 THER/PROPH/DIAG INJ SC/IM: CPT

## 2022-05-31 PROCEDURE — G0378 HOSPITAL OBSERVATION PER HR: HCPCS

## 2022-05-31 PROCEDURE — 80053 COMPREHEN METABOLIC PANEL: CPT

## 2022-05-31 PROCEDURE — 97162 PT EVAL MOD COMPLEX 30 MIN: CPT

## 2022-05-31 PROCEDURE — 65270000029 HC RM PRIVATE

## 2022-05-31 PROCEDURE — 74011250636 HC RX REV CODE- 250/636: Performed by: INTERNAL MEDICINE

## 2022-05-31 PROCEDURE — 74011250637 HC RX REV CODE- 250/637: Performed by: FAMILY MEDICINE

## 2022-05-31 PROCEDURE — 85018 HEMOGLOBIN: CPT

## 2022-05-31 PROCEDURE — 77030038269 HC DRN EXT URIN PURWCK BARD -A

## 2022-05-31 PROCEDURE — 85025 COMPLETE CBC W/AUTO DIFF WBC: CPT

## 2022-05-31 PROCEDURE — 36415 COLL VENOUS BLD VENIPUNCTURE: CPT

## 2022-05-31 PROCEDURE — 97530 THERAPEUTIC ACTIVITIES: CPT

## 2022-05-31 PROCEDURE — 97161 PT EVAL LOW COMPLEX 20 MIN: CPT

## 2022-05-31 RX ORDER — LEVOFLOXACIN 250 MG/1
250 TABLET ORAL DAILY
Status: COMPLETED | OUTPATIENT
Start: 2022-05-31 | End: 2022-06-01

## 2022-05-31 RX ORDER — ENOXAPARIN SODIUM 100 MG/ML
40 INJECTION SUBCUTANEOUS DAILY
Status: DISCONTINUED | OUTPATIENT
Start: 2022-06-01 | End: 2022-06-09 | Stop reason: HOSPADM

## 2022-05-31 RX ADMIN — TRAZODONE HYDROCHLORIDE 50 MG: 50 TABLET ORAL at 21:00

## 2022-05-31 RX ADMIN — ACETAMINOPHEN 650 MG: 325 TABLET ORAL at 21:00

## 2022-05-31 RX ADMIN — ALPRAZOLAM 0.5 MG: 0.5 TABLET ORAL at 21:00

## 2022-05-31 RX ADMIN — ENOXAPARIN SODIUM 30 MG: 100 INJECTION SUBCUTANEOUS at 08:28

## 2022-05-31 RX ADMIN — HALOPERIDOL LACTATE 2 MG: 5 INJECTION, SOLUTION INTRAMUSCULAR at 20:56

## 2022-05-31 RX ADMIN — HALOPERIDOL LACTATE 2 MG: 5 INJECTION, SOLUTION INTRAMUSCULAR at 00:24

## 2022-05-31 RX ADMIN — ALPRAZOLAM 0.5 MG: 0.5 TABLET ORAL at 08:28

## 2022-05-31 RX ADMIN — METOPROLOL TARTRATE 50 MG: 50 TABLET, FILM COATED ORAL at 17:14

## 2022-05-31 RX ADMIN — METOPROLOL TARTRATE 50 MG: 50 TABLET, FILM COATED ORAL at 08:28

## 2022-05-31 RX ADMIN — LEVOFLOXACIN 250 MG: 250 TABLET, FILM COATED ORAL at 12:00

## 2022-05-31 NOTE — PROGRESS NOTES
Director of Ruddy Caal called to get an update. I returned call but had to leave a message.  Medical information faxed to her at [phone]

## 2022-05-31 NOTE — ROUTINE PROCESS
Found IV dislodged. Patient is refusing to be stuck and pulling her arm back. Was combative earlier and hitting at staff.

## 2022-05-31 NOTE — ROUTINE PROCESS
Perfect Serve sent to infection control this morning concerning isolation for COVID. Per infection control isolation should be 10 days minimum from day that she tested positive  - which per [facility] staff was the 26th of May.

## 2022-05-31 NOTE — ROUTINE PROCESS
Found coming out of the end of the bed. Stating she was going to find her  - refusing to get back in the bed or take her medicine. States she wants to go home. Sitting at the foot of the bed eating her lunch. 1242 - took a few bites of meatloaf, one forkful of mashed potatoes and 1/2 container of yogurt.

## 2022-05-31 NOTE — PROGRESS NOTES
Bedside shift change report given to EMILIANA Geller (oncoming nurse) by Tanvi Ponce LPN (offgoing nurse). Report included the following information SBAR, Kardex, Intake/Output and MAR.

## 2022-05-31 NOTE — PROGRESS NOTES
Spiritual Care Assessment/Progress Note  Sridhar Saleh      NAME: Jonathan Walsh      MRN: [id number]  AGE: 80 y.o.  SEX: female  Alevism Affiliation: Gnosticism   Language: English     5/31/2022     Total Time (in minutes): 10     Spiritual Assessment begun in [facility] MED/SURG through conversation with:         []Patient        [x] Family    [] Friend(s)        Reason for Consult: Initial/Spiritual assessment, patient floor     Spiritual beliefs: (Please include comment if needed)     [x] Identifies with a danni tradition:         [] Supported by a danni community:            [] Claims no spiritual orientation:           [] Seeking spiritual identity:                [] Adheres to an individual form of spirituality:           [] Not able to assess:                           Identified resources for coping:      [] Prayer                               [] Music                  [] Guided Imagery     [x] Family/friends                 [] Pet visits     [] Devotional reading                         [] Unknown     [] Other:                                 Interventions offered during this visit: (See comments for more details)    Patient Interventions: Affirmation of emotions/emotional suffering,Affirmation of danni,Iconic (affirming the presence of God/Higher Power),Initial/Spiritual assessment, patient floor,Prayer (assurance of)           Plan of Care:     [x] Support spiritual and/or cultural needs    [] Support AMD and/or advance care planning process      [] Support grieving process   [] Coordinate Rites and/or Rituals    [] Coordination with community clergy   [] No spiritual needs identified at this time   [] Detailed Plan of Care below (See Comments)  [] Make referral to Music Therapy  [] Make referral to Pet Therapy     [] Make referral to Addiction services  [] Make referral to [facility]  [] Make referral to Spiritual Care Partner  [] No future visits requested        [] Contact Spiritual Care for further referrals     Comments: Initial spiritual assessment in Rm 124 in Med/Surg  Talked with daughter outside the room. Daughter shared about   Her mother at [facility] and  Her altered mental status. Suggested her tell hospitalist  her concerns. Provided empathic listening and spiritual support.  Advised of  Availability   [address] [place] [address]

## 2022-05-31 NOTE — ROUTINE PROCESS
Bedside and Verbal shift change report given to ARNOLDO Ponce RN (oncoming nurse) by Ulices Hoang RN (offgoing nurse). Report included the following information ADIN, MAR and Recent Results. Sutton score 4  Bed/chair alarm is in use. If in use it is set at the highest volume. Intravenous fluids were administered, normal saline 100 ml/hr  Patient Vitals for the past 12 hrs:   Temp Pulse Resp BP SpO2   05/31/22 1621 96.9 °F (36.1 °C) 95 16 (!) 171/71 96 %   05/31/22 1145 97.6 °F (36.4 °C) 82 18 (!) 150/72 94 %   05/31/22 0754 97.6 °F (36.4 °C) (!) 110 20 (!) 140/82 94 %     No flowsheet data found. All lab results for the last 24 hours reviewed.

## 2022-05-31 NOTE — PROGRESS NOTES
Spent up to 45 mins in room with pt. Was able to tell me her name and how many daughters she has. Still very altered and restless. Luiz Han had not eaten yet, did eat her salad and drink water. Was not able to feed herself.

## 2022-05-31 NOTE — PROGRESS NOTES
Problem: Mobility Impaired (Adult and Pediatric)  Goal: *Acute Goals and Plan of Care (Insert Text)  Outcome: Not Met  Note: FUNCTIONAL STATUS PRIOR TO ADMISSION: The patient was functional at the wheelchair level and required contact guard assistance for transfers to the chair. This needs to be confirmed by facility. Pt is largely confused and it is unclear what extent her dependence was. HOME SUPPORT PRIOR TO ADMISSION:  pt is a resident of [facility] in assisted living    Physical Therapy Goals  Initiated 5/31/2022  1. Patient will move from supine to sit and sit to supine , scoot up and down, and roll side to side in bed with modified independence within 7 day(s). 2.  Patient will transfer from bed to chair and chair to bed with minimal assistance/contact guard assist using the least restrictive device within 7 day(s). 3.  Patient will perform sit to stand with supervision/set-up within 7 day(s). 4.  Patient will ambulate with minimal assistance/contact guard assist for 15 feet with the least restrictive device within 7 day(s). 5.  Patient will return to baseline level of activity to be determined in next few days. PHYSICAL THERAPY EVALUATION  Patient: Freddie Louise (19 y.o. female)  Date: 5/31/2022  Primary Diagnosis: AMS (altered mental status) [R41.82]  Altered mental status [R41.82]  FMRPP-41 [U07.1]        Precautions: COVID +       ASSESSMENT  Based on the objective data described below, the patient presents with significant confusion and some agitation though this is improved since admission. Pt is conversing some though obsessed ith her blue bag (personal belongings) and unsure about PT in room. Nurse in room as well so she was mostly cooperative. Pt is very weak, has limited bed mobility, Covid +, appears that she cannot walk and uses mostly WC at [facility] - though this needs to be confirmed.   Patient cannot stand fully and needs mod support to stand,- but since she is smal, this can be done with A x 1. Need to try walker tomorrow. Suggest A x 2 for this. Current Level of Function Impacting Discharge (mobility/balance): unable to stand/walk,mental status    Functional Outcome Measure: The patient scored 0 on the Barthel outcome measure which is indicative of dependence. .      Other factors to consider for discharge: has attentive daughter     Patient will benefit from skilled therapy intervention to address the above noted impairments. PLAN :  Recommendations and Planned Interventions: bed mobility training, transfer training, gait training, therapeutic exercises, patient and family training/education, and therapeutic activities      Frequency/Duration: Patient will be followed by physical therapy:  4 times a week to address goals. Recommendation for discharge: (in order for the patient to meet his/her long term goals)  Therapy up to 5 days/week in SNF setting    This discharge recommendation:  Has not yet been discussed the attending provider and/or case management    IF patient discharges home will need the following DME: to be determined (TBD)         SUBJECTIVE:   Patient stated I had physical therapy once.     OBJECTIVE DATA SUMMARY:   HISTORY:    Past Medical History:   Diagnosis Date    Arthritis     Cancer ([address]     skin ca removed from nose and leg    Chronic pain     left knee    GERD (gastroesophageal reflux disease)     Hypertension     Menopause     Other ill-defined conditions(799.89)     HIGH CHOLESTEROL    Recurrent UTI     TMJ (dislocation of temporomandibular joint)      Past Surgical History:   Procedure Laterality Date    HX GI      COLONOSCOPY    HX HEENT      BILATERAL CATARACTS,     HX HEENT Right     DETACHED RETINA     HX ORTHOPAEDIC Right 4/2013    TKR    HX TONSILLECTOMY  1940'S       Personal factors and/or comorbidities impacting plan of care:     Home Situation  Home Environment: Assisted living  [address] [facility]: [facility]  One/Two Story Residence: One story  Living Alone: No  Support Systems: Child(lou),Long Term Care Facility,Assisted Living  Patient Expects to be Discharged to[de-identified] [facility]  Current DME Used/Available at Home: Other (comment)    EXAMINATION/PRESENTATION/DECISION MAKING:   Critical Behavior:  Neurologic State: Alert,Confused  Orientation Level: Disoriented X4  Cognition: Decreased command following,Impulsive,Poor safety awareness,Memory loss     Hearing: Auditory  Auditory Impairment: None  Skin:  reddened, sores on LEs  Edema:   Range Of Motion:  AROM: Generally decreased, functional                       Strength:    Strength: Generally decreased, functional (very weak all over, but some functional ability)                    Tone & Sensation:   Tone: Normal                              Coordination:     Vision:      Functional Mobility:  Bed Mobility:  Rolling: Moderate assistance  Supine to Sit: Moderate assistance  Sit to Supine: Minimum assistance;Assist x2  Scooting: Moderate assistance  Transfers:  Sit to Stand: Moderate assistance  Stand to Sit: Minimum assistance  Stand Pivot Transfers: Moderate assistance                    Balance:   Sitting: Impaired  Sitting - Static: Fair (occasional)  Sitting - Dynamic: Poor (constant support)  Standing: Impaired  Standing - Static: Poor  Standing - Dynamic : Not tested  Ambulation/Gait Training:      Did not attempt ambulation today as needs A x 2 most likely in current mental state. Will do tomorrow with assist x 2. Gait Description (WDL):  (na)     Right Side Weight Bearing: Full  Left Side Weight Bearing: Full                                 Stairs: Therapeutic Exercises: Ankle pumps    Functional Measure:  Barthel Index:                                  0 /100       The Barthel ADL Index: Guidelines  1. The index should be used as a record of what a patient does, not as a record of what a patient could do.   2. The main aim is to establish degree of independence from any help, physical or verbal, however minor and for whatever reason. 3. The need for supervision renders the patient not independent. 4. A patient's performance should be established using the best available evidence. Asking the patient, friends/relatives and nurses are the usual sources, but direct observation and common sense are also important. However direct testing is not needed. 5. Usually the patient's performance over the preceding 24-48 hours is important, but occasionally longer periods will be relevant. 6. Middle categories imply that the patient supplies over 50 per cent of the effort. 7. Use of aids to be independent is allowed. Score Interpretation (from [address] [address])    Independent   60-79 Minimally independent   40-59 Partially dependent   20-39 Very dependent   <20 Totally dependent     -Meera Roche., Barthel, D.W. (1965). Functional evaluation: the Barthel Index. [facility] ([address] (1997). The Barthel activities of daily living index: self-reporting versus actual performance in the old (> or = 75 years). Journal of [address] [place] [address] 457), [facility], J.J.MVaneF, Babatunde Vieyra., Leeroy Alva. (1999). Measuring the change in disability after inpatient rehabilitation; comparison of the responsiveness of the Barthel Index and Functional [place] Measure. Journal of Neurology, Neurosurgery, and Psychiatry, 66(4), 553-282. ADAN Bowles.LIZZIE, NEYDA Ayers, & Jackelyn Barrera MVaneA. (2004) Assessment of post-stroke quality of life in cost-effectiveness studies: The usefulness of the Barthel Index and the EuroQoL-5D.  Quality of Life Research, 13, 427-43    0       Physical Therapy Evaluation Charge Determination   History Examination Presentation Decision-Making   MEDIUM  Complexity : 1-2 comorbidities / personal factors will impact the outcome/ POC  LOW Complexity : 1-2 Standardized tests and measures addressing body structure, function, activity limitation and / or participation in recreation  MEDIUM Complexity : Evolving with changing characteristics  LOW Complexity : FOTO score of       Based on the above components, the patient evaluation is determined to be of the following complexity level: LOW     Pain Rating:  Unable to rate, no c/o    Activity Tolerance:   Good    After treatment patient left in no apparent distress:   Supine in bed    COMMUNICATION/EDUCATION:   The patients plan of care was discussed with: Occupational therapist and Registered nurse. Patient is unable to participate in goal setting and plan of care.     Thank you for this referral.  Pura Interiano, PT   Time Calculation: 23 mins

## 2022-05-31 NOTE — PROGRESS NOTES
Problem: Falls - Risk of  Goal: *Absence of Falls  Description: Document Vikash Begum Fall Risk and appropriate interventions in the flowsheet. Outcome: Progressing Towards Goal  Note: Fall Risk Interventions:  Mobility Interventions: Bed/chair exit alarm    Mentation Interventions: Bed/chair exit alarm,Reorient patient    Medication Interventions: Bed/chair exit alarm    Elimination Interventions: Bed/chair exit alarm,Call light in reach    History of Falls Interventions: Bed/chair exit alarm         Problem: Hypertension  Goal: *Blood pressure within specified parameters  Outcome: Progressing Towards Goal  Goal: *Fluid volume balance  Outcome: Progressing Towards Goal  Goal: *Labs within defined limits  Outcome: Progressing Towards Goal     Problem: Patient Education: Go to Patient Education Activity  Goal: Patient/Family Education  Outcome: Progressing Towards Goal     Problem: Airway Clearance - Ineffective  Goal: Achieve or maintain patent airway  Outcome: Progressing Towards Goal     Problem: Gas Exchange - Impaired  Goal: Absence of hypoxia  Outcome: Progressing Towards Goal  Goal: Promote optimal lung function  Outcome: Progressing Towards Goal     Problem: Breathing Pattern - Ineffective  Goal: Ability to achieve and maintain a regular respiratory rate  Outcome: Progressing Towards Goal     Problem:  Body Temperature -  Risk of, Imbalanced  Goal: Ability to maintain a body temperature within defined limits  Outcome: Progressing Towards Goal  Goal: Will regain or maintain usual level of consciousness  Outcome: Progressing Towards Goal  Goal: Complications related to the disease process, condition or treatment will be avoided or minimized  Outcome: Progressing Towards Goal     Problem: Isolation Precautions - Risk of Spread of Infection  Goal: Prevent transmission of infectious organism to others  Outcome: Progressing Towards Goal     Problem: Risk for Fluid Volume Deficit  Goal: Maintain normal heart rhythm  Outcome: Progressing Towards Goal  Goal: Maintain absence of muscle cramping  Outcome: Progressing Towards Goal  Goal: Maintain normal serum potassium, sodium, calcium, phosphorus, and pH  Outcome: Progressing Towards Goal     Problem: Loneliness or Risk for Loneliness  Goal: Demonstrate positive use of time alone when socialization is not possible  Outcome: Progressing Towards Goal     Problem: Fatigue  Goal: Verbalize increase energy and improved vitality  Outcome: Progressing Towards Goal     Problem: Patient Education: Go to Patient Education Activity  Goal: Patient/Family Education  Outcome: Progressing Towards Goal     Problem: Urinary Tract Infection - Adult  Goal: *Absence of infection signs and symptoms  Outcome: Progressing Towards Goal     Problem: Patient Education: Go to Patient Education Activity  Goal: Patient/Family Education  Outcome: Progressing Towards Goal

## 2022-05-31 NOTE — PROGRESS NOTES
In to check pt vitals, pt is very agitated and combative. Talking to nursing staff in a derogatory manner. Pt was trying to hit and bite staff. B/p and HR elevated and MEWs score now a 3. IM Haldol given for agitation. Dr. Sindy Willett made aware of new MEWs score. Will re-check vitals in and make the doctor aware of any changes. 0330-pt still very confused and agitated. Sitting in bed picking with gown and IV.  IV wrapping reinforced and pt gown put back

## 2022-05-31 NOTE — PROGRESS NOTES
Problem: Falls - Risk of  Goal: *Absence of Falls  Description: Document Paulino Ortiz Fall Risk and appropriate interventions in the flowsheet.   Outcome: Progressing Towards Goal  Note: Fall Risk Interventions:  Mobility Interventions: Bed/chair exit alarm,Utilize walker, cane, or other assistive device    Mentation Interventions: Bed/chair exit alarm,Adequate sleep, hydration, pain control,Reorient patient,Room close to nurse's station    Medication Interventions: Bed/chair exit alarm,Teach patient to arise slowly    Elimination Interventions: Bed/chair exit alarm,Call light in reach    History of Falls Interventions: Bed/chair exit alarm,Room close to nurse's station

## 2022-06-01 ENCOUNTER — APPOINTMENT (OUTPATIENT)
Dept: GENERAL RADIOLOGY | Age: 87
DRG: 178 | End: 2022-06-01
Attending: HOSPITALIST
Payer: MEDICARE

## 2022-06-01 LAB
ANION GAP SERPL CALC-SCNC: 14 MMOL/L (ref 5–15)
BUN SERPL-MCNC: 11 MG/DL (ref 6–20)
BUN/CREAT SERPL: 18 (ref 12–20)
CALCIUM SERPL-MCNC: 8.7 MG/DL (ref 8.5–10.1)
CHLORIDE SERPL-SCNC: 96 MMOL/L (ref 97–108)
CO2 SERPL-SCNC: 18 MMOL/L (ref 21–32)
CREAT SERPL-MCNC: 0.61 MG/DL (ref 0.55–1.02)
ERYTHROCYTE [DISTWIDTH] IN BLOOD BY AUTOMATED COUNT: 12.5 % (ref 11.5–14.5)
GLUCOSE SERPL-MCNC: 84 MG/DL (ref 65–100)
HCT VFR BLD AUTO: 34.2 % (ref 35–47)
HGB BLD-MCNC: 12 G/DL (ref 11.5–16)
MCH RBC QN AUTO: 29.9 PG (ref 26–34)
MCHC RBC AUTO-ENTMCNC: 35.1 G/DL (ref 30–36.5)
MCV RBC AUTO: 85.1 FL (ref 80–99)
NRBC # BLD: 0 K/UL (ref 0–0.01)
NRBC BLD-RTO: 0 PER 100 WBC
PLATELET # BLD AUTO: 211 K/UL (ref 150–400)
PMV BLD AUTO: 13.3 FL (ref 8.9–12.9)
POTASSIUM SERPL-SCNC: 3.4 MMOL/L (ref 3.5–5.1)
RBC # BLD AUTO: 4.02 M/UL (ref 3.8–5.2)
SODIUM SERPL-SCNC: 128 MMOL/L (ref 136–145)
WBC # BLD AUTO: 7.8 K/UL (ref 3.6–11)

## 2022-06-01 PROCEDURE — 74011250636 HC RX REV CODE- 250/636: Performed by: INTERNAL MEDICINE

## 2022-06-01 PROCEDURE — 74011250637 HC RX REV CODE- 250/637: Performed by: FAMILY MEDICINE

## 2022-06-01 PROCEDURE — 85027 COMPLETE CBC AUTOMATED: CPT

## 2022-06-01 PROCEDURE — 36415 COLL VENOUS BLD VENIPUNCTURE: CPT

## 2022-06-01 PROCEDURE — 74011250637 HC RX REV CODE- 250/637: Performed by: INTERNAL MEDICINE

## 2022-06-01 PROCEDURE — 65270000029 HC RM PRIVATE

## 2022-06-01 PROCEDURE — 80048 BASIC METABOLIC PNL TOTAL CA: CPT

## 2022-06-01 RX ORDER — HYDRALAZINE HYDROCHLORIDE 25 MG/1
25 TABLET, FILM COATED ORAL
Status: DISCONTINUED | OUTPATIENT
Start: 2022-06-01 | End: 2022-06-09 | Stop reason: HOSPADM

## 2022-06-01 RX ADMIN — ALPRAZOLAM 0.5 MG: 0.5 TABLET ORAL at 21:02

## 2022-06-01 RX ADMIN — ACETAMINOPHEN 650 MG: 325 TABLET ORAL at 09:02

## 2022-06-01 RX ADMIN — ENOXAPARIN SODIUM 40 MG: 100 INJECTION SUBCUTANEOUS at 09:01

## 2022-06-01 RX ADMIN — METOPROLOL TARTRATE 50 MG: 50 TABLET, FILM COATED ORAL at 09:02

## 2022-06-01 RX ADMIN — TRAZODONE HYDROCHLORIDE 50 MG: 50 TABLET ORAL at 21:02

## 2022-06-01 RX ADMIN — ACETAMINOPHEN 650 MG: 325 TABLET ORAL at 21:02

## 2022-06-01 RX ADMIN — LEVOFLOXACIN 250 MG: 250 TABLET, FILM COATED ORAL at 13:33

## 2022-06-01 RX ADMIN — METOPROLOL TARTRATE 50 MG: 50 TABLET, FILM COATED ORAL at 18:02

## 2022-06-01 RX ADMIN — HALOPERIDOL LACTATE 2 MG: 5 INJECTION, SOLUTION INTRAMUSCULAR at 18:03

## 2022-06-01 NOTE — PROGRESS NOTES
Bedside shift change report given to Jen Freire RN (oncoming nurse) by YESICA Ponce LPN (offgoing nurse). Report included the following information SBAR, Kardex, Intake/Output and MAR.

## 2022-06-01 NOTE — PROGRESS NOTES
Problem: Falls - Risk of  Goal: *Absence of Falls  Description: Document Sidney Fierro Fall Risk and appropriate interventions in the flowsheet. Outcome: Not Progressing Towards Goal  Note: Fall Risk Interventions:  Mobility Interventions: Bed/chair exit alarm,Utilize walker, cane, or other assistive device,Patient to call before getting OOB    Mentation Interventions: Bed/chair exit alarm,More frequent rounding,Reorient patient,Room close to nurse's station    Medication Interventions: Bed/chair exit alarm,Patient to call before getting OOB,Teach patient to arise slowly    Elimination Interventions: Bed/chair exit alarm,Call light in reach    History of Falls Interventions: Bed/chair exit alarm   Pt AMS makes it hard to keep pt in bed. Have to constantly redirect pt.

## 2022-06-01 NOTE — PROGRESS NOTES
Patient upgraded to inpatient status. Medicare pt has received, reviewed, and signed 1st IM letter informing them of their right to appeal the discharge. Signed copy has been placed on pt bedside chart.

## 2022-06-01 NOTE — ROUTINE PROCESS
Bedside and Verbal shift change report given to [facility] (oncoming nurse) by Percy Stephens (offgoing nurse). Report included the following information SBAR, Kardex, Intake/Output, MAR, Recent Results and Cardiac Rhythm NA.

## 2022-06-01 NOTE — ROUTINE PROCESS
0900:  Encouraged pt to eat but pt declined. attempted benitez-care but pt started swatting at primary nurse. 1212:  Notified Dr. Shey Torres of HTN, SBP 180s-200s, while pt at rest.  He voiced he will look into it. 1650:  Pt dis not want to be messed leela kept stating 'No\" when trying to do VS.  Able to obtain BP to leg. About 1800 attempt to administer pill- Lopressor but pt will now swallow it.

## 2022-06-01 NOTE — PROGRESS NOTES
Follow up visit with patient in Rm 124 in Med/Surg  Talked with daughter outside the room  Daughter was concerned about  Patients demeanor  Provided empathic listening and spiritual support  Advised of  Availability   [address] [place] [address]

## 2022-06-01 NOTE — PROGRESS NOTES
IDR Team; MD, Nursing, Care Manager, Nursing Supervisor, Pharmacy and , met to review patient's plan of care. Discussed goals, interventions, barriers and progress.      Team will continue to monitor progress and report any concerns to the physician and care management as indicated.     Transition of Care Plan: Per RN patient has been swatting at nurse when she has to give her incontinence care. Also spits. Patient does take her PO meds. Patient was made inpatient yesterday. Qualifying midnights include 5/31,6/1 and 6/2. Eligible for skilled care on 6/3. Will be skilled at [facility] since she is from there. RN charge nurse said Salvadore Relic can cause confusion in elderly. Plan to check electrolytes (Na low at 128) and check into levaquin.

## 2022-06-01 NOTE — PROGRESS NOTES
Was called by lab because of a missed HgB that was supposed to be drawn at 2315. Reading the order, pt Hgb was fine all day with morning labs at 12.7 then first Hgb being 11.7. Order was for it to be checked q6 and then the first 2 hours after PRBCs. Pt did not receive and blood today and is here for AMS. Pt's Hgb is in normal range and there is no concern for bleeding. Dr. Mann Bro advised and verbal order given to discontinue. Pt still has daily CBC in her orders.

## 2022-06-01 NOTE — PROGRESS NOTES
[facility]  Hospitalist Progress Note    NAME: Feliberto Worthington   :  1933   MRN:  [id number]     Total duration of encounter: 2 days      Interim Hospital Summary: 80 y.o. female who presented on 2022 with AMS (altered mental status). She has a past medical history of Arthritis, Cancer ([address] Chronic pain, GERD (gastroesophageal reflux disease), Hypertension, Menopause, Other ill-defined conditions(799.89), Recurrent UTI, and TMJ (dislocation of temporomandibular joint). She has no past medical history of Endocrine disorder. .          Subjective:     Chief Complaint / Reason for Physician Visit  Altered Mental Status  Patient is an 80year old female who presented to the ED on 2022 with complaints of altered mental status. Recently diagnosed with COVID and has been on monoclonal antibody treatment. She resides at [facility]. Today, she is awake, alert, mumbling a few words intermittently. Denies SOB. Discussed with RN   Patient has attempted to pull off her tele monitor, and her IV access.     Review of Systems:  Symptom Y/N Comments  Symptom Y/N Comments   Fever/Chills    Chest Pain     Poor Appetite    Edema     Cough    Abdominal Pain     Sputum    Joint Pain     SOB/DALY n   Pruritis/Rash     Nausea/vomit    Tolerating PT/OT     Diarrhea    Tolerating Diet     Constipation    Other         Current Facility-Administered Medications:     levoFLOXacin (LEVAQUIN) tablet 250 mg, 250 mg, Oral, DAILY, Sonny Iglesias MD, 250 mg at 22 1200    [START ON 2022] enoxaparin (LOVENOX) injection 40 mg, 40 mg, SubCUTAneous, DAILY, Uyen Quezada MD    sodium chloride (NS) flush 5-40 mL, 5-40 mL, IntraVENous, Q8H, Melly Harris MD    sodium chloride (NS) flush 5-40 mL, 5-40 mL, IntraVENous, PRN, Melly Harris MD    magnesium hydroxide (MILK OF MAGNESIA) 400 mg/5 mL oral suspension 30 mL, 30 mL, Oral, DAILY PRN, Melly Harris MD    haloperidol lactate (HALDOL) injection 2 mg, 2 mg, IntraMUSCular, Q6H PRN, Melly Maloney MD, 2 mg at 05/31/22 0024    metoprolol tartrate (LOPRESSOR) tablet 50 mg, 50 mg, Oral, BID, Nesrane, Melly FLORES MD, 50 mg at 05/31/22 1714    acetaminophen (TYLENOL) tablet 650 mg, 650 mg, Oral, Q6H PRN, Eileen Rendon MD, 650 mg at 05/30/22 2219    ondansetron [facility]) injection 4 mg, 4 mg, IntraVENous, Q4H PRN, Eileen Rendon MD    traZODone (DESYREL) tablet 50 mg, 50 mg, Oral, QHS, Matilde Solorio MD, 50 mg at 05/30/22 2219    ALPRAZolam Ana Paula Lesch) tablet 0.5 mg, 0.5 mg, Oral, BID PRN, Eileen Rendon MD, 0.5 mg at 05/31/22 1881    Objective:     VITALS:   Patient Vitals for the past 12 hrs:   Temp Pulse Resp BP SpO2   05/31/22 1621 96.9 °F (36.1 °C) 95 16 (!) 171/71 96 %   05/31/22 1145 97.6 °F (36.4 °C) 82 18 (!) 150/72 94 %       Intake/Output Summary (Last 24 hours) at 5/31/2022 2043  Last data filed at 5/31/2022 1941  Gross per 24 hour   Intake 1576 ml   Output 1700 ml   Net -124 ml        PHYSICAL EXAM:  General: WD, WN. Alert, cooperative, no acute distress    EENT:  EOMI. Anicteric sclerae. MMM  Resp:  CTA bilaterally, no wheezing or rales. No accessory muscle use  CV:  Regular  rhythm,  No edema  GI:  Soft, Non distended, Non tender. +Bowel sounds  Neurologic:  Alert and oriented X 3, normal speech,   Psych:   Good insight. Not anxious nor agitated  Skin:  Visible  No jaundice    LABS:  I reviewed today's most current labs and imaging studies. Pertinent labs include:  Recent Labs     05/31/22  1723 05/31/22  1118 05/31/22  0545 05/30/22  1729 05/29/22 2037   WBC  --   --  7.8  --  8.4   HGB 11.7 11.7 12.7   < > 11.3*   HCT  --   --  35.9  --  32.0*   PLT  --   --  238  --  220    < > = values in this interval not displayed.      Recent Labs     05/31/22  0545 05/29/22 2037   *  129* 127*   K 3.8  3.8 4.1   CL 95*  94* 93*   CO2 22 21 22   GLU 77  76 98   BUN 9  9 19   CREA 0.75 0. 72 1.35*   CA 8.7  8.8 9.0   MG 1.6  --    ALB 3.3* 3.5   TBILI 0.7 0.5   ALT 31 25       RADIOLOGY:  [unfilled]      Procedures: see electronic medical records for all procedures/Xrays and details which were not copied into this note but were reviewed prior to creation of Plan. Assessment / Plan:    Principal Problem:    AMS (altered mental status) (5/29/2022)   Continue telemetry for now. Fall precautions. Active Problems:    Recurrent UTI (6/5/2018)    Continue antibiotics. Consider oral antibiotics. COVID-19 (5/30/2022)    Continue isolation protocols. LISBET (acute kidney injury) [address] [address] (5/30/2022)    Monitor BMP. Altered mental status (5/31/2022)          ______________________________________________________________________  SAFETY:   Code Status:DNR  DVT prophylaxis:Lovenox  Stress Ulcer prophylaxis: Pepcid  Bladder catheter:No  Family Contact Info:  Primary Emergency Contact: Ct Hughes Denita Witt, Home Phone: [phone]  Bedded: [facility] Room 124/01  Disposition: TBD, likely to assisted living   Admission status:  Inpatient    Reviewed most current lab test results and cultures  YES  Reviewed most current radiology test results   YES  Review and summation of old records today    NO  Reviewed patient's current orders and MAR    YES  PMH/SH reviewed - no change compared to H&P    Care Plan discussed with:                                   Comments  Patient x     Family  x    RN x     Care Manager       Consultant                           Multidiciplinary team rounds were held today with , nursing, pharmacist and clinical coordinator. Patient's plan of care was discussed; medications were reviewed and discharge planning was addressed.         ____________________________________________    Total NON Critical Care TIME:  30   Minutes        Comments   >50% of visit spent in counseling and coordination of care       Signed: Noy Soriano MD  [facility] Hospitalist  [phone]

## 2022-06-02 ENCOUNTER — APPOINTMENT (OUTPATIENT)
Dept: GENERAL RADIOLOGY | Age: 87
DRG: 178 | End: 2022-06-02
Attending: HOSPITALIST
Payer: MEDICARE

## 2022-06-02 LAB
ATRIAL RATE: 64 BPM
CALCULATED P AXIS, ECG09: 32 DEGREES
CALCULATED R AXIS, ECG10: 38 DEGREES
CALCULATED T AXIS, ECG11: -7 DEGREES
DIAGNOSIS, 93000: NORMAL
P-R INTERVAL, ECG05: 200 MS
Q-T INTERVAL, ECG07: 424 MS
QRS DURATION, ECG06: 70 MS
QTC CALCULATION (BEZET), ECG08: 437 MS
VENTRICULAR RATE, ECG03: 64 BPM

## 2022-06-02 PROCEDURE — 77030038269 HC DRN EXT URIN PURWCK BARD -A

## 2022-06-02 PROCEDURE — 71045 X-RAY EXAM CHEST 1 VIEW: CPT

## 2022-06-02 PROCEDURE — 65270000029 HC RM PRIVATE

## 2022-06-02 PROCEDURE — 74011250636 HC RX REV CODE- 250/636: Performed by: INTERNAL MEDICINE

## 2022-06-02 PROCEDURE — 74011000250 HC RX REV CODE- 250: Performed by: INTERNAL MEDICINE

## 2022-06-02 RX ORDER — ALPRAZOLAM 0.25 MG/1
0.25 TABLET ORAL
Status: DISCONTINUED | OUTPATIENT
Start: 2022-06-02 | End: 2022-06-03

## 2022-06-02 RX ORDER — POTASSIUM CHLORIDE AND SODIUM CHLORIDE 450; 150 MG/100ML; MG/100ML
INJECTION, SOLUTION INTRAVENOUS CONTINUOUS
Status: DISCONTINUED | OUTPATIENT
Start: 2022-06-02 | End: 2022-06-04

## 2022-06-02 RX ORDER — METOPROLOL TARTRATE 5 MG/5ML
5 INJECTION INTRAVENOUS EVERY 6 HOURS
Status: DISCONTINUED | OUTPATIENT
Start: 2022-06-03 | End: 2022-06-03

## 2022-06-02 RX ADMIN — POTASSIUM CHLORIDE AND SODIUM CHLORIDE: 450; 150 INJECTION, SOLUTION INTRAVENOUS at 12:46

## 2022-06-02 RX ADMIN — POTASSIUM CHLORIDE AND SODIUM CHLORIDE: 450; 150 INJECTION, SOLUTION INTRAVENOUS at 21:50

## 2022-06-02 RX ADMIN — SODIUM CHLORIDE, PRESERVATIVE FREE 5 ML: 5 INJECTION INTRAVENOUS at 14:00

## 2022-06-02 RX ADMIN — ENOXAPARIN SODIUM 40 MG: 100 INJECTION SUBCUTANEOUS at 09:38

## 2022-06-02 NOTE — PROGRESS NOTES
Problem: Falls - Risk of  Goal: *Absence of Falls  Description: Document Marciana Distance Fall Risk and appropriate interventions in the flowsheet.   Outcome: Progressing Towards Goal  Note: Fall Risk Interventions:  Mobility Interventions: Assess mobility with egress test    Mentation Interventions: Bed/chair exit alarm    Medication Interventions: Patient to call before getting OOB,Bed/chair exit alarm    Elimination Interventions: Bed/chair exit alarm    History of Falls Interventions: Bed/chair exit alarm         Problem: Gas Exchange - Impaired  Goal: Absence of hypoxia  Outcome: Progressing Towards Goal

## 2022-06-02 NOTE — PROGRESS NOTES
Problem: Falls - Risk of  Goal: *Absence of Falls  Description: Document Arik Fu Fall Risk and appropriate interventions in the flowsheet.   Outcome: Progressing Towards Goal  Note: Fall Risk Interventions:  Mobility Interventions: Bed/chair exit alarm    Mentation Interventions: Bed/chair exit alarm,Adequate sleep, hydration, pain control,Door open when patient unattended,Reorient patient,Room close to nurse's station    Medication Interventions: Bed/chair exit alarm    Elimination Interventions: Bed/chair exit alarm,Call light in reach    History of Falls Interventions: Bed/chair exit alarm         Problem: Hypertension  Goal: *Blood pressure within specified parameters  Outcome: Not Progressing Towards Goal  Goal: *Fluid volume balance  Outcome: Not Progressing Towards Goal  Goal: *Labs within defined limits  Outcome: Not Progressing Towards Goal

## 2022-06-02 NOTE — PROGRESS NOTES
Pt is more congested with rhonchi and crackles in lung fields. Gabriel served Dr. Kaushal Skelton and he would like a chest xray completed in the morning. Will notify him of any changes.

## 2022-06-02 NOTE — PROGRESS NOTES
Bedside shift change report given to CARO Birmingham RN (oncoming nurse) by [employer]. LIZBETH Ponce (offgoing nurse). Report included the following information SBAR, Kardex, MAR and Recent Results.

## 2022-06-02 NOTE — ROUTINE PROCESS
0930:  Pills held as pt too drowsy to safely swallow. Asked pt if she wanted Vanilla ice cream for breakfast- she shook her head and returns to snoring. 1140:  Pt still drowsy to do bedside swallow. Son Bekah Malhotra at bedside. Will call Chikis Kathleen to update her as well. Will call provider for IV nd IVF orders if pt able to have IV stay in.    1414:  Second called to [facility]. Message Left. About Heri Tang [address], called. She voiced her interest in using hand mittens to \"restraint\" if patients starts pulling on IV and tubings. Let her know we can re-evaluate when pt is more active.

## 2022-06-02 NOTE — PROGRESS NOTES
IDR Team; MD, Nursing, Care Manager, Physical therapy, Nursing Supervisor, Pharmacy and Dietician, met to review patient's plan of care. Discussed goals, interventions, barriers and progress. RUR: 14%  LOW    Team will continue to monitor progress and report any concerns to the physician and care management as indicated. Transition of Care Plan:   Nurse noted coughing when drinking. Beverley swallowing study was to be done by the nurses and if the patient failed the test. Will order Speech Therapy if failed. Patient will return to [facility], probably to skilled care.

## 2022-06-02 NOTE — PROGRESS NOTES
[facility]  Hospitalist Progress Note    NAME: Isaiah Man   :  1933   MRN:  [id number]     Total duration of encounter: 3 days      . Leonid Patel Subjective:     Chief Complaint / Reason for Physician Visit  Altered Mental Status. Patient is an 80year old female who was brought to the ER with complaints of altered mental status. Recently diagnosed and treated for the COVID with monoclonal antibodies. Currently mumbling. Non cooperative. No fever. No vomiting. No diarrhea.   Discussed with RN       Review of Systems:  Symptom Y/N Comments  Symptom Y/N Comments   Fever/Chills y   Chest Pain     Poor Appetite y   Edema     Cough n   Abdominal Pain     Sputum    Joint Pain     SOB/DALY    Pruritis/Rash     Nausea/vomit    Tolerating PT/OT n    Diarrhea    Tolerating Diet     Constipation    Other         Current Facility-Administered Medications:     hydrALAZINE (APRESOLINE) tablet 25 mg, 25 mg, Oral, Q8H PRN, Solomon Soler MD    enoxaparin (LOVENOX) injection 40 mg, 40 mg, SubCUTAneous, DAILY, Solomon Soler MD, 40 mg at 22 0901    sodium chloride (NS) flush 5-40 mL, 5-40 mL, IntraVENous, Q8H, Melly Harris MD    sodium chloride (NS) flush 5-40 mL, 5-40 mL, IntraVENous, PRN, Melly Harris MD    magnesium hydroxide (MILK OF MAGNESIA) 400 mg/5 mL oral suspension 30 mL, 30 mL, Oral, DAILY PRN, Melly Harris MD    haloperidol lactate (HALDOL) injection 2 mg, 2 mg, IntraMUSCular, Q6H PRN, Melly Harris MD, 2 mg at 22 1803    metoprolol tartrate (LOPRESSOR) tablet 50 mg, 50 mg, Oral, BID, Melly Harris MD, 50 mg at 22 180    acetaminophen (TYLENOL) tablet 650 mg, 650 mg, Oral, Q6H PRN, Matilde Wiley MD, 650 mg at 22 0902    ondansetron (ZOFRAN) injection 4 mg, 4 mg, IntraVENous, Q4H PRN, Chacha Herrera MD    traZODone (DESYREL) tablet 50 mg, 50 mg, Oral, Matilde Viramontes MD, 50 mg at 22 2100   ALPRAZolam Mahogany Hendrickson) tablet 0.5 mg, 0.5 mg, Oral, BID PRN, Karina Bello MD, 0.5 mg at 05/31/22 2100    Objective:     VITALS:   Patient Vitals for the past 12 hrs:   Temp Pulse Resp BP SpO2   06/01/22 1648 -- -- 20 (!) 198/96 --   06/01/22 1157 97.1 °F (36.2 °C) 66 19 (!) 187/79 92 %       Intake/Output Summary (Last 24 hours) at 6/1/2022 2034  Last data filed at 6/1/2022 1807  Gross per 24 hour   Intake 235 ml   Output --   Net 235 ml        PHYSICAL EXAM:  General: no acute distress    EENT:  Anicteric sclerae. MMM  Resp:  CTA bilaterally, no wheezing or rales. No accessory muscle use  CV:  Regular  rhythm,  No edema  GI:  Soft, Non distended, Non tender. +Bowel sounds  Neurologic:  Not following commands. Skin:  No rashes. No jaundice    LABS:  I reviewed today's most current labs and imaging studies. Pertinent labs include:  Recent Labs     06/01/22  0606 05/31/22  1723 05/31/22  1118 05/31/22  0545 05/31/22  0545 05/30/22 1729 05/29/22 2037   WBC 7.8  --   --   --  7.8  --  8.4   HGB 12.0 11.7 11.7   < > 12.7   < > 11.3*   HCT 34.2*  --   --   --  35.9  --  32.0*     --   --   --  238  --  220    < > = values in this interval not displayed. Recent Labs     06/01/22  0606 05/31/22  0545 05/29/22 2037   * 130*  129* 127*   K 3.4* 3.8  3.8 4.1   CL 96* 95*  94* 93*   CO2 18* 22  21 22   GLU 84 77  76 98   BUN 11 9  9 19   CREA 0.61 0.75  0.72 1.35*   CA 8.7 8.7  8.8 9.0   MG  --  1.6  --    ALB  --  3.3* 3.5   TBILI  --  0.7 0.5   ALT  --  31 25             Procedures: see electronic medical records for all procedures/Xrays and details which were not copied into this note but were reviewed prior to creation of Plan. Assessment / Plan:    Principal Problem:    AMS (altered mental status) (5/29/2022)    -On haldol, anxiolytic, trazodone.     -check labs? Meds?     -PT/OT.   Active Problems:    Recurrent UTI (6/5/2018)    -bactrim    COVID-19 (5/30/2022)    -Was treated with antibodies. LISBET (acute kidney injury) ([facility] [address] (5/30/2022)    Hyponatremia    -IV fluids. -Monitor labs. Hypertension     -On beta blockers. Hydralazine.    -Monitor BP, HR.              ______________________________________________________________________  SAFETY:   Code Status:DNR  DVT prophylaxis:Lovenox  Stress Ulcer prophylaxis: Pepcid  Bladder catheter: No.  Family Contact Info:  Primary Emergency Contact: Reubin Burkitt, Home Phone: [phone]  Bedded: [facility] Room 124/01  Disposition: TBD, likely to assisted living. Admission status:  Inpatient    Reviewed most current lab test results and cultures  YES  Reviewed most current radiology test results   YES  Review and summation of old records today    NO  Reviewed patient's current orders and MAR    YES  PMH/ reviewed - no change compared to H&P    Care Plan discussed with:                                   Comments  Patient x     Family      RN x     Care Manager       Consultant                           Multidiciplinary team rounds were held today with , nursing, pharmacist and clinical coordinator. Patient's plan of care was discussed; medications were reviewed and discharge planning was addressed.         ____________________________________________    Total NON Critical Care TIME:  30   Minutes        Comments   >50% of visit spent in counseling and coordination of care       Signed: Katelynn Rey MD  [facility] Hospitalist  [phone]

## 2022-06-02 NOTE — PROGRESS NOTES
Physical Therapy Note:    PT treatment deferred. Pt reportedly with somnolence, awakens briefly and returns to sleep. Pt unable to remain awake sufficiently for med administration per RN. Pt not appropriate for skilled PT at this time. Will continue to follow per POC. PT ramiro completed 5/31/22.     Dorothy Smith, PT, DPT, [place]

## 2022-06-03 PROBLEM — R41.0 DELIRIUM: Status: ACTIVE | Noted: 2022-06-03

## 2022-06-03 PROCEDURE — 92610 EVALUATE SWALLOWING FUNCTION: CPT

## 2022-06-03 PROCEDURE — 77030038269 HC DRN EXT URIN PURWCK BARD -A

## 2022-06-03 PROCEDURE — 65270000029 HC RM PRIVATE

## 2022-06-03 PROCEDURE — 74011000250 HC RX REV CODE- 250: Performed by: INTERNAL MEDICINE

## 2022-06-03 PROCEDURE — 74011250636 HC RX REV CODE- 250/636: Performed by: INTERNAL MEDICINE

## 2022-06-03 RX ORDER — FAMOTIDINE 20 MG/1
20 TABLET, FILM COATED ORAL EVERY EVENING
Status: DISCONTINUED | OUTPATIENT
Start: 2022-06-03 | End: 2022-06-09 | Stop reason: HOSPADM

## 2022-06-03 RX ORDER — METOPROLOL SUCCINATE 50 MG/1
50 TABLET, EXTENDED RELEASE ORAL DAILY
Status: DISCONTINUED | OUTPATIENT
Start: 2022-06-04 | End: 2022-06-04

## 2022-06-03 RX ORDER — HYDRALAZINE HYDROCHLORIDE 20 MG/ML
20 INJECTION INTRAMUSCULAR; INTRAVENOUS
Status: DISCONTINUED | OUTPATIENT
Start: 2022-06-03 | End: 2022-06-03

## 2022-06-03 RX ADMIN — ENOXAPARIN SODIUM 40 MG: 100 INJECTION SUBCUTANEOUS at 14:02

## 2022-06-03 RX ADMIN — METOPROLOL TARTRATE 5 MG: 5 INJECTION INTRAVENOUS at 19:01

## 2022-06-03 RX ADMIN — POTASSIUM CHLORIDE AND SODIUM CHLORIDE: 450; 150 INJECTION, SOLUTION INTRAVENOUS at 14:11

## 2022-06-03 RX ADMIN — SODIUM CHLORIDE, PRESERVATIVE FREE 10 ML: 5 INJECTION INTRAVENOUS at 14:01

## 2022-06-03 RX ADMIN — METOPROLOL TARTRATE 5 MG: 5 INJECTION INTRAVENOUS at 00:53

## 2022-06-03 RX ADMIN — POTASSIUM CHLORIDE AND SODIUM CHLORIDE: 450; 150 INJECTION, SOLUTION INTRAVENOUS at 05:52

## 2022-06-03 RX ADMIN — METOPROLOL TARTRATE 5 MG: 5 INJECTION INTRAVENOUS at 13:33

## 2022-06-03 RX ADMIN — METOPROLOL TARTRATE 5 MG: 5 INJECTION INTRAVENOUS at 07:30

## 2022-06-03 NOTE — PROGRESS NOTES
Patient is resident at [facility], assisted Living. Multiple lab results are abnormal. Contacted [facility] to give them an update on the patient. Re evaluate discharge status on Monday. 6/6/22.

## 2022-06-03 NOTE — PROGRESS NOTES
Chart reviewed. Spoke with nursing who reports patient very lethargic and unable to participate with therapy at this time. Will f/u tomorrow as able.  Karson Muro, PT

## 2022-06-03 NOTE — ROUTINE PROCESS
Bedside and Verbal shift change report given to Javi Negro (oncoming nurse) by Noy Aguilar (offgoing nurse). Report included the following information SBAR, Kardex, MAR and Recent ResultsI/o.

## 2022-06-03 NOTE — PROGRESS NOTES
Follow up visit with patient/daughter in Rm 124 in MED/SURG  Daughter visited outside the room  Provided empathic liistening and spiritual support  Advised of  Availability   [address] [place] [address]

## 2022-06-03 NOTE — PROGRESS NOTES
Bedside and Verbal shift change report given to Yamile Chery (oncoming nurse) by JADON Mercedes  (offgoing nurse). Report included the following information SBAR, Kardex, Intake/Output, MAR, Recent Results and Quality Measures.

## 2022-06-03 NOTE — PROGRESS NOTES
Speech path  Acknowledge consult to evaluate this patient's swallowing after failing the [place] screening. She will be evaluated this afternoon.    Arlen Xiong, SLP

## 2022-06-03 NOTE — PROGRESS NOTES
[facility]  Hospitalist Progress Note    NAME: Feliberto Worthington   :  1933   MRN:  [id number]     Total duration of encounter: 5 days      Interim Hospital Summary: 80 y.o. female who presented on 2022 with AMS (altered mental status). She has a past medical history of Arthritis, Cancer ([address] Chronic pain, GERD (gastroesophageal reflux disease), Hypertension, Menopause, Other ill-defined conditions(799.89), Recurrent UTI, and TMJ (dislocation of temporomandibular joint). She has no past medical history of Endocrine disorder. .          Subjective:     Chief Complaint / Reason for Physician Visit  Altered Mental Status. Patient is an 80year old female who presented to the ED on 2022 with complaints of altered mental status. Today, patient was asleep. Arousable. No apparent cardiopulmonary distress. Decrease sedatives. On IV fluids. Isolation protocols.       Discussed with RN       Review of Systems:  Symptom Y/N Comments  Symptom Y/N Comments   Fever/Chills n   Chest Pain     Poor Appetite    Edema     Cough n   Abdominal Pain     Sputum    Joint Pain     SOB/DLAY    Pruritis/Rash     Nausea/vomit    Tolerating PT/OT     Diarrhea    Tolerating Diet     Constipation    Other         Current Facility-Administered Medications:     ALPRAZolam (XANAX) tablet 0.25 mg, 0.25 mg, Oral, BID PRN, Uyen Quezada MD    0.45% sodium chloride with KCl 20 mEq/L infusion, , IntraVENous, CONTINUOUS, Uyen Quezada MD, Last Rate: 125 mL/hr at 220, New Bag at 22 2150    metoprolol (LOPRESSOR) injection 5 mg, 5 mg, IntraVENous, Q6H, Uyen Quezada MD    hydrALAZINE (APRESOLINE) tablet 25 mg, 25 mg, Oral, Q8H PRN, Uyen Quezada MD    enoxaparin (LOVENOX) injection 40 mg, 40 mg, SubCUTAneous, DAILY, Sonny Iglesias MD, 40 mg at 22 0938    sodium chloride (NS) flush 5-40 mL, 5-40 mL, IntraVENous, Q8H, NesraneMelly MD, 5 mL at 22 1400   sodium chloride (NS) flush 5-40 mL, 5-40 mL, IntraVENous, PRN, Kimberly, Melly FLORES MD    magnesium hydroxide (MILK OF MAGNESIA) 400 mg/5 mL oral suspension 30 mL, 30 mL, Oral, DAILY PRN, Kimberly, Shalonda Washington MD    haloperidol lactate (HALDOL) injection 2 mg, 2 mg, IntraMUSCular, Q6H PRN, Melly Miner MD, 2 mg at 06/01/22 1803    acetaminophen (TYLENOL) tablet 650 mg, 650 mg, Oral, Q6H PRN, Matilde Wiley MD, 650 mg at 06/01/22 2102    ondansetron (ZOFRAN) injection 4 mg, 4 mg, IntraVENous, Q4H PRN, Chacha Herrera MD    traZODone (DESYREL) tablet 50 mg, 50 mg, Oral, QHS, Chacha Herrera MD, 50 mg at 06/01/22 2102    Objective:     VITALS:   Patient Vitals for the past 12 hrs:   Temp Pulse Resp BP SpO2   06/02/22 2203 -- (!) 115 -- (!) 148/77 --   06/02/22 2156 97 °F (36.1 °C) (!) 118 22 (!) 192/86 93 %   06/02/22 1655 97 °F (36.1 °C) 98 20 (!) 176/72 95 %       Intake/Output Summary (Last 24 hours) at 6/3/2022 0000  Last data filed at 6/2/2022 1953  Gross per 24 hour   Intake 889.58 ml   Output --   Net 889.58 ml        PHYSICAL EXAM:  General: No apparent acute distress. Resp:  CTA bilaterally, no wheezing or rales. No accessory muscle use  CV:  Regular  rhythm,  No edema  GI:  Soft, Non distended, Non tender. +Bowel sounds  Neurologic:  Sensation grossly intact. Skin:  No rashes. No jaundice    LABS:  I reviewed today's most current labs and imaging studies. Pertinent labs include:  Recent Labs     06/01/22  0606 05/31/22  1723 05/31/22  1118 05/31/22  0545 05/31/22  0545   WBC 7.8  --   --   --  7.8   HGB 12.0 11.7 11.7   < > 12.7   HCT 34.2*  --   --   --  35.9     --   --   --  238    < > = values in this interval not displayed.      Recent Labs     06/01/22  0606 05/31/22  0545   * 130*  129*   K 3.4* 3.8  3.8   CL 96* 95*  94*   CO2 18* 22  21   GLU 84 77  76   BUN 11 9  9   CREA 0.61 0.75  0.72   CA 8.7 8.7  8.8   MG  --  1.6   ALB  --  3.3*   TBILI  --  0.7   ALT --  31       RADIOLOGY:  [unfilled]      Procedures: see electronic medical records for all procedures/Xrays and details which were not copied into this note but were reviewed prior to creation of Plan. Assessment / Plan:    Principal Problem:    AMS (altered mental status) (5/29/2022)   will decrease benzo  Active Problems:    Recurrent UTI (6/5/2018)   -bactrim    Follow urinalysis. COVID-19 (5/30/2022)    -Treated. LISBET (acute kidney injury) ([facility] [address] (5/30/2022)    Hyponatremia. Etiology? -IV fluids. -Monitor BUN/Cre    Altered mental status (5/31/2022)       Hypertension.     -On beta blocker.     -Hydralazine prn.       ______________________________________________________________________  SAFETY:   Code Status:DNR  DVT prophylaxis:Lovenox  Stress Ulcer prophylaxis: pepcid  Bladder catheter:no (Ting Mack). Family Contact Info:  Primary Emergency Contact: Ji Reyes, Home Phone: [phone]  Bedded: [facility] Room 124/01  Disposition: TBD, likely back to [facility]  Admission status:  Inpatient    Reviewed most current lab test results and cultures  YES  Reviewed most current radiology test results   YES  Review and summation of old records today    NO  Reviewed patient's current orders and MAR    YES  PMH/ reviewed - no change compared to H&P    Care Plan discussed with:                                   Comments  Patient x     Family  x    RN x     Care Manager       Consultant                           Multidiciplinary team rounds were held today with , nursing, pharmacist and clinical coordinator. Patient's plan of care was discussed; medications were reviewed and discharge planning was addressed.         ____________________________________________    Total NON Critical Care TIME:  30   Minutes        Comments   >50% of visit spent in counseling and coordination of care       Signed: Larissa Reynolds MD  [facility] Hospitalist  [phone]

## 2022-06-03 NOTE — PROGRESS NOTES
Problem: Falls - Risk of  Goal: *Absence of Falls  Description: Document Will Holderann Fall Risk and appropriate interventions in the flowsheet.   Outcome: Progressing Towards Goal  Note: Fall Risk Interventions:  Mobility Interventions: Bed/chair exit alarm,Patient to call before getting OOB    Mentation Interventions: Bed/chair exit alarm,Door open when patient unattended,Room close to nurse's station    Medication Interventions: Bed/chair exit alarm,Patient to call before getting OOB    Elimination Interventions: Bed/chair exit alarm,Call light in reach    History of Falls Interventions: Bed/chair exit alarm,Door open when patient unattended,Room close to nurse's station

## 2022-06-03 NOTE — PROGRESS NOTES
Pt has been sleeping and unavailable  for Skilled OT evaluation at this time. Discussed with RN and she reports she has covid and a UTI. OT evaluation deferred.

## 2022-06-03 NOTE — PROGRESS NOTES
Problem: Dysphagia (Adult)  Goal: *Acute Goals and Plan of Care (Insert Text)  Description: 6/3/2022  Speech path goals  1. Patient will tolerate reeval of swallowing. Outcome: Not Met   SPEECH LANGUAGE PATHOLOGY BEDSIDE SWALLOW EVALUATION  Patient: Alexandria Pool (24 y.o. female)  Date: 6/3/2022  Primary Diagnosis: AMS (altered mental status) [R41.82]  Altered mental status [R41.82]  PDCTW-09 [U07.1]        Precautions:        ASSESSMENT :  Based on the objective data described below, the patient presents with significant lethargy interfering with her ability to take po. She did not verbalize nor follow commands. She nodded several times to questions. When offered ice she did not close her mouth on the spoon. Her mouth was open at rest. She was making snoring noises as she breathed. She was very lethargic and she has not been sedated since 6/1/22 per nsg. Possible L facial weakness but since she did not follow commands it was difficult to determine fully. Curious what has caused this change or has she been this lethargic all along? Or is this UTI related? Patient will benefit from skilled intervention to address the above impairments. Patients rehabilitation potential is considered to be Guarded     PLAN :  Recommendations and Planned Interventions:  NPO  Will reeval Monday if she is alert enough. Consider etiology of mental status change over the past few days. Apparently at one point she was eating and communicating   Frequency/Duration: Patient will be followed by speech-language pathology 3 times a week to address goals. Discharge Recommendations: To Be Determined     SUBJECTIVE:   Patient did not verbalize or follow commands.   OBJECTIVE:     Past Medical History:   Diagnosis Date    Arthritis     Cancer ([facility] [address]     skin ca removed from nose and leg    Chronic pain     left knee    GERD (gastroesophageal reflux disease)     Hypertension     Menopause     Other ill-defined conditions(886.51) HIGH CHOLESTEROL    Recurrent UTI     TMJ (dislocation of temporomandibular joint)      Past Surgical History:   Procedure Laterality Date    HX GI      COLONOSCOPY    HX HEENT      BILATERAL CATARACTS,     HX HEENT Right     DETACHED RETINA     HX ORTHOPAEDIC Right 4/2013    TKR    HX TONSILLECTOMY  1940'S     Prior Level of Function/Home Situation:   Home Situation  [facility] [address] [facility]  One/Two Story Residence: One story  Living Alone: No  Support Systems: Child(lou),Long Term Care Facility,Assisted Living  Patient Expects to be Discharged to[de-identified] [facility]  Current DME Used/Available at Home: Other (comment)  Diet prior to admission:   Current Diet:  NPO  Cognitive and Communication Status:  Neurologic State: Drowsy,Sleeping  Orientation Level: Oriented to person (nodded yes to [employer]")  Cognition: Impaired decision making,Decreased command following           Oral Assessment:  Oral Assessment  Labial:  (question of L facial weakness)  Lingual: Other (comment) (could not assess due to not following commands)  Velum: Unable to visualize  Mandible: No impairment  P.O. Trials:     Vocal quality prior to P.O.:             Bolus Acceptance: Impaired      Did not close her mouth on the spoon but she was lethargic. Oral Phase Severity: Severe       NOMS:   The NOMS functional outcome measure was used to quantify this patient's level of swallowing impairment. Based on the NOMS, the patient was determined to be at level 1 for swallow function       NOMS Swallowing Levels:  Level 1 (CN): NPO  Level 2 (CM): NPO but takes consistency in therapy  Level 3 (CL): Takes less than 50% of nutrition p.o. and continues with nonoral feedings; and/or safe with mod cues; and/or max diet restriction  Level 4 (CK):  Safe swallow but needs mod cues; and/or mod diet restriction; and/or still requires some nonoral feeding/supplements  Level 5 (CJ): Safe swallow with min diet restriction; and/or needs min cues  Level 6 (CI): Independent with p.o.; rare cues; usually self cues; may need to avoid some foods or needs extra time  Level 7 ([address] [place] [address]): Independent for all p.o.  CL. (2003). National Outcomes Measurement System (NOMS): Adult Speech-Language Pathology User's Guide. Pain:  Pain Scale 1: FACES  Pain Intensity 1: 3  Pain Location 1: Arm    After treatment:   Patient left in no apparent distress in bed    COMMUNICATION/EDUCATION:   Patient was not alert enough to be educated about her swallowing. Discussed NPO with nsg. The patient's plan of care including recommendations, planned interventions, and recommended diet changes were discussed with: Registered nurse. Patient is unable to participate in goal setting and plan of care.     Thank you for this referral.  MANAV Jason  Time Calculation: 15 mins

## 2022-06-04 ENCOUNTER — APPOINTMENT (OUTPATIENT)
Dept: GENERAL RADIOLOGY | Age: 87
DRG: 178 | End: 2022-06-04
Attending: INTERNAL MEDICINE
Payer: MEDICARE

## 2022-06-04 PROBLEM — S52.501A FRACTURE OF DISTAL END OF RIGHT RADIUS: Status: ACTIVE | Noted: 2022-06-04

## 2022-06-04 LAB
ALBUMIN SERPL-MCNC: 2.3 G/DL (ref 3.5–5)
ALBUMIN/GLOB SERPL: 0.6 {RATIO} (ref 1.1–2.2)
ALP SERPL-CCNC: 70 U/L (ref 45–117)
ALT SERPL-CCNC: 30 U/L (ref 12–78)
ANION GAP SERPL CALC-SCNC: 15 MMOL/L (ref 5–15)
AST SERPL-CCNC: 25 U/L (ref 15–37)
BASOPHILS # BLD: 0 K/UL (ref 0–0.1)
BASOPHILS NFR BLD: 0 % (ref 0–1)
BILIRUB SERPL-MCNC: 1 MG/DL (ref 0.2–1)
BUN SERPL-MCNC: 14 MG/DL (ref 6–20)
BUN/CREAT SERPL: 24 (ref 12–20)
CALCIUM SERPL-MCNC: 8.4 MG/DL (ref 8.5–10.1)
CHLORIDE SERPL-SCNC: 95 MMOL/L (ref 97–108)
CO2 SERPL-SCNC: 17 MMOL/L (ref 21–32)
CREAT SERPL-MCNC: 0.59 MG/DL (ref 0.55–1.02)
CRP SERPL-MCNC: 22.1 MG/DL (ref 0–0.6)
D DIMER PPP FEU-MCNC: 2.57 MG/L FEU (ref 0–0.65)
DIFFERENTIAL METHOD BLD: ABNORMAL
EOSINOPHIL # BLD: 0 K/UL (ref 0–0.4)
EOSINOPHIL NFR BLD: 0 % (ref 0–7)
ERYTHROCYTE [DISTWIDTH] IN BLOOD BY AUTOMATED COUNT: 12.9 % (ref 11.5–14.5)
FERRITIN SERPL-MCNC: 319 NG/ML (ref 8–252)
GLOBULIN SER CALC-MCNC: 3.9 G/DL (ref 2–4)
GLUCOSE SERPL-MCNC: 95 MG/DL (ref 65–100)
HCT VFR BLD AUTO: 33 % (ref 35–47)
HGB BLD-MCNC: 11.4 G/DL (ref 11.5–16)
IMM GRANULOCYTES # BLD AUTO: 0.1 K/UL (ref 0–0.04)
IMM GRANULOCYTES NFR BLD AUTO: 1 % (ref 0–0.5)
LYMPHOCYTES # BLD: 1.5 K/UL (ref 0.8–3.5)
LYMPHOCYTES NFR BLD: 11 % (ref 12–49)
MAGNESIUM SERPL-MCNC: 1.4 MG/DL (ref 1.6–2.4)
MCH RBC QN AUTO: 29.9 PG (ref 26–34)
MCHC RBC AUTO-ENTMCNC: 34.5 G/DL (ref 30–36.5)
MCV RBC AUTO: 86.6 FL (ref 80–99)
MONOCYTES # BLD: 1.5 K/UL (ref 0–1)
MONOCYTES NFR BLD: 11 % (ref 5–13)
NEUTS SEG # BLD: 10.6 K/UL (ref 1.8–8)
NEUTS SEG NFR BLD: 77 % (ref 32–75)
NRBC # BLD: 0 K/UL (ref 0–0.01)
NRBC BLD-RTO: 0 PER 100 WBC
PLATELET # BLD AUTO: 227 K/UL (ref 150–400)
PLATELET COMMENTS,PCOM: ABNORMAL
PMV BLD AUTO: 13.4 FL (ref 8.9–12.9)
POTASSIUM SERPL-SCNC: 4.1 MMOL/L (ref 3.5–5.1)
PROT SERPL-MCNC: 6.2 G/DL (ref 6.4–8.2)
RBC # BLD AUTO: 3.81 M/UL (ref 3.8–5.2)
RBC MORPH BLD: ABNORMAL
SODIUM SERPL-SCNC: 127 MMOL/L (ref 136–145)
URATE SERPL-MCNC: 5.7 MG/DL (ref 2.6–6)
WBC # BLD AUTO: 13.7 K/UL (ref 3.6–11)

## 2022-06-04 PROCEDURE — 80053 COMPREHEN METABOLIC PANEL: CPT

## 2022-06-04 PROCEDURE — 74011250636 HC RX REV CODE- 250/636: Performed by: INTERNAL MEDICINE

## 2022-06-04 PROCEDURE — 73100 X-RAY EXAM OF WRIST: CPT

## 2022-06-04 PROCEDURE — 83735 ASSAY OF MAGNESIUM: CPT

## 2022-06-04 PROCEDURE — 85379 FIBRIN DEGRADATION QUANT: CPT

## 2022-06-04 PROCEDURE — 77030038269 HC DRN EXT URIN PURWCK BARD -A

## 2022-06-04 PROCEDURE — 84550 ASSAY OF BLOOD/URIC ACID: CPT

## 2022-06-04 PROCEDURE — 65270000029 HC RM PRIVATE

## 2022-06-04 PROCEDURE — 86140 C-REACTIVE PROTEIN: CPT

## 2022-06-04 PROCEDURE — 36415 COLL VENOUS BLD VENIPUNCTURE: CPT

## 2022-06-04 PROCEDURE — 82728 ASSAY OF FERRITIN: CPT

## 2022-06-04 PROCEDURE — 74011250637 HC RX REV CODE- 250/637: Performed by: INTERNAL MEDICINE

## 2022-06-04 PROCEDURE — 85025 COMPLETE CBC W/AUTO DIFF WBC: CPT

## 2022-06-04 RX ORDER — METOPROLOL TARTRATE 25 MG/1
25 TABLET, FILM COATED ORAL 2 TIMES DAILY
Status: DISCONTINUED | OUTPATIENT
Start: 2022-06-04 | End: 2022-06-09 | Stop reason: HOSPADM

## 2022-06-04 RX ORDER — SODIUM CHLORIDE 9 MG/ML
75 INJECTION, SOLUTION INTRAVENOUS CONTINUOUS
Status: DISCONTINUED | OUTPATIENT
Start: 2022-06-04 | End: 2022-06-05

## 2022-06-04 RX ORDER — MAGNESIUM SULFATE HEPTAHYDRATE 40 MG/ML
2 INJECTION, SOLUTION INTRAVENOUS ONCE
Status: COMPLETED | OUTPATIENT
Start: 2022-06-04 | End: 2022-06-04

## 2022-06-04 RX ADMIN — ENOXAPARIN SODIUM 40 MG: 100 INJECTION SUBCUTANEOUS at 09:00

## 2022-06-04 RX ADMIN — SODIUM CHLORIDE 75 ML/HR: 9 INJECTION, SOLUTION INTRAVENOUS at 13:29

## 2022-06-04 RX ADMIN — MAGNESIUM SULFATE HEPTAHYDRATE 2 G: 40 INJECTION, SOLUTION INTRAVENOUS at 14:49

## 2022-06-04 RX ADMIN — FAMOTIDINE 20 MG: 20 TABLET, FILM COATED ORAL at 18:39

## 2022-06-04 RX ADMIN — METOPROLOL TARTRATE 25 MG: 25 TABLET, FILM COATED ORAL at 18:39

## 2022-06-04 RX ADMIN — POTASSIUM CHLORIDE AND SODIUM CHLORIDE: 450; 150 INJECTION, SOLUTION INTRAVENOUS at 01:38

## 2022-06-04 NOTE — PROGRESS NOTES
Comprehensive Nutrition Assessment    Type and Reason for Visit: Initial,NPO/clear liquid    Nutrition Recommendations/Plan:   1. If pt to remain NPO, need to find family wishes regarding nutrition support   2. Advance diet per MD and ST when pt is alert enough   3. May want to consider IVF with some dextrose until pt can put on a diet or nutrition support   4. Pt needs new labs-last labs 6/1      Malnutrition Assessment:  Malnutrition Status:  Mild malnutrition (06/03/22 2113)    Context:  Acute illness     Findings of the 6 clinical characteristics of malnutrition:   Energy Intake:  50% or less of est energy requirements for 5 or more days  Weight Loss:  No significant weight loss     Body Fat Loss:  Unable to assess,     Muscle Mass Loss:  Unable to assess,    Fluid Accumulation:  No significant fluid accumulation,     Strength:  Not performed     Nutrition Assessment:  Present on Admission:   AMS (altered mental status)   Recurrent UTI   LISBET (acute kidney injury) ([facility] [address]   Delirium   Hypertension    Pt admitted with above. She was on a diet on admission and for a few days then on 6/1 she was lethargic and unable to be on a diet any longer. Her intake was poor during that time too. Today is around day 3 of NPO. She is getting IVF but would possibly benefit from some dextrose. Labs: 6/1-normal blood sugars, NA-low 128, K-3.4 low, CL-96 low, CO2, 18 low. Meds-reviewed. Pt has increased protein needs r/t wound to sacrum. Her weight is stable. Pt is being seen by ST.       Nutrition Related Findings:      Wound Type: Pressure injury (sacrum)    Current Nutrition Intake & Therapies:  Average Meal Intake: NPO  Average Supplement Intake: None ordered  DIET NPO   Patient Vitals for the past 168 hrs:   % Diet Eaten   06/01/22 1807 0%   06/01/22 1240 1 - 25%   06/01/22 1200 0%   06/01/22 0904 0%   05/31/22 1941 26 - 50%   05/31/22 0800 0%   05/30/22 2032 1 - 25%       Anthropometric Measures:  Height: 5' 5\" (165.1 cm)  Ideal Body Weight (IBW): 125 lbs (57 kg)   Current Body Wt:  74.8 kg (165 lb), 132 % IBW. Current BMI (kg/m2): 27.5   BMI Category: Overweight (BMI 25.0-29. 9)   Weight Loss Metrics 5/29/2022 3/31/2022 9/26/2019 9/4/2019 8/21/2019 7/11/2019 4/29/2019   Today's Wt 165 lb 165 lb 165 lb 164 lb 163 lb 162 lb 164 lb   BMI 27.46 kg/m2 27.46 kg/m2 27.46 kg/m2 27.29 kg/m2 27.12 kg/m2 26.96 kg/m2 27.29 kg/m2         Estimated Daily Nutrient Needs:  Energy Requirements Based On: Formula  Weight Used for Energy Requirements: Current  Energy (kcal/day): 1416  Weight Used for Protein Requirements: Current  Protein (g/day): 90  Method Used for Fluid Requirements: 1 ml/kcal  Fluid (ml/day): 1416    Nutrition Diagnosis:   · Inadequate oral intake related to cognitive or neurological impairment,catabolic illness as evidenced by NPO or clear liquid status due to medical condition,intake 0-25%,wounds      Nutrition Interventions:   Food and/or Nutrient Delivery: Continue NPO  Nutrition Education/Counseling: No recommendations at this time,Education not indicated  Coordination of Nutrition Care: Continue to monitor while inpatient,Swallow evaluation,Speech therapy,Feeding assistance/environmental change       Goals:     Goals: Initiate PO diet,within 2 days       Nutrition Monitoring and Evaluation:   Behavioral-Environmental Outcomes: None identified  Food/Nutrient Intake Outcomes: Diet advancement/tolerance,IVF intake  Physical Signs/Symptoms Outcomes: Biochemical data,Chewing or swallowing,Weight    Discharge Planning:     Too soon to determine    Gideon Carroll RD

## 2022-06-04 NOTE — PROGRESS NOTES
Problem: Falls - Risk of  Goal: *Absence of Falls  Description: Document Jacoby Turner Fall Risk and appropriate interventions in the flowsheet.   Outcome: Progressing Towards Goal  Note: Fall Risk Interventions:  Mobility Interventions: Bed/chair exit alarm    Mentation Interventions: Bed/chair exit alarm,Room close to nurse's station,Toileting rounds,Update white board    Medication Interventions: Bed/chair exit alarm    Elimination Interventions: Bed/chair exit alarm,Call light in reach,Stay With Me (per policy)    History of Falls Interventions: Bed/chair exit alarm,Room close to nurse's station         Problem: Hypertension  Goal: *Blood pressure within specified parameters  Outcome: Progressing Towards Goal  Goal: *Fluid volume balance  Outcome: Progressing Towards Goal  Goal: *Labs within defined limits  Outcome: Progressing Towards Goal     Problem: Gas Exchange - Impaired  Goal: Absence of hypoxia  Outcome: Progressing Towards Goal  Goal: Promote optimal lung function  Outcome: Progressing Towards Goal     Problem: Risk for Fluid Volume Deficit  Goal: Maintain absence of muscle cramping  Outcome: Progressing Towards Goal  Goal: Maintain normal serum potassium, sodium, calcium, phosphorus, and pH  Outcome: Progressing Towards Goal     Problem: Aspiration - Risk of  Goal: *Absence of aspiration  Outcome: Progressing Towards Goal

## 2022-06-04 NOTE — PROGRESS NOTES
[facility]  Hospitalist Progress Note    NAME: Leland Esparza   :  1933   MRN:  [id number]     Total duration of encounter: 6 days      Interim Hospital Summary: 80 y.o. female who presented on 2022 with AMS (altered mental status). She has a past medical history of Arthritis, Cancer ([address] Chronic pain, GERD (gastroesophageal reflux disease), Hypertension, Menopause, Other ill-defined conditions(799.89), Recurrent UTI, and TMJ (dislocation of temporomandibular joint). She has no past medical history of Endocrine disorder. .      Pt admitted  with AMS. Dx COVID at [facility]  tx Monoclonal Ab.  AMS / Nonverbal and not cooperative. No h/o cough, fever, dyspnea. Mostly c/o fatigue / myalgia. No GI upset. Had received tx for UTI about a week ago. Tx Haldol / Xanax till . Tx Levaquin  till . Having pain / swelling R wrist - xray noted for distal radial fx. Subjective:     Chief Complaint / Reason for Physician Visit  \"-\". Discussed with RN   More alert this evening, was more sedate this AM again w/o known reason  Was too sedate to take meds last night and again tonight.     Having pain with movement / touch with R UE / Wrist.  Uncertain trauma PTA  Xrays noted for distal radial fractures     Unclear EtOH history PTA  Need to r/o withdrawal. EtOH level was not obtained  Seems to nod / facial expression that she does have cocktails at [facility]      Review of Systems:  UTO  Symptom Y/N Comments  Symptom Y/N Comments   Fever/Chills    Chest Pain     Poor Appetite    Edema     Cough    Abdominal Pain     Sputum    Joint Pain     SOB/DALY    Pruritis/Rash     Nausea/vomit    Tolerating PT/OT     Diarrhea    Tolerating Diet     Constipation    Other         Current Facility-Administered Medications:     0.9% sodium chloride infusion, 75 mL/hr, IntraVENous, CONTINUOUS, Jluis Kamara MD, Last Rate: 75 mL/hr at 22 1329, 75 mL/hr at 22 1329    magnesium sulfate 2 g/50 ml IVPB (premix or compounded), 2 g, IntraVENous, ONCE, Efren Nguyen MD    metoprolol tartrate (LOPRESSOR) tablet 25 mg, 25 mg, Oral, BID, Efren Nguyen MD    famotidine (PEPCID) tablet 20 mg, 20 mg, Oral, QPM, Efren Nguyen MD    hydrALAZINE (APRESOLINE) tablet 25 mg, 25 mg, Oral, Q8H PRN, Radha Murillo MD    enoxaparin (LOVENOX) injection 40 mg, 40 mg, SubCUTAneous, DAILY, Radha Murillo MD, 40 mg at 06/04/22 0900    sodium chloride (NS) flush 5-40 mL, 5-40 mL, IntraVENous, Q8H, Nesrane, Melly FLORES MD, 10 mL at 06/03/22 1401    sodium chloride (NS) flush 5-40 mL, 5-40 mL, IntraVENous, PRN, Nesrane, Thalia Hirsch MD    magnesium hydroxide (MILK OF MAGNESIA) 400 mg/5 mL oral suspension 30 mL, 30 mL, Oral, DAILY PRN, Nesrane, Melly FLORES MD    haloperidol lactate (HALDOL) injection 2 mg, 2 mg, IntraMUSCular, Q6H PRN, Peola Dose, Melly FLORES MD, 2 mg at 06/01/22 1803    acetaminophen (TYLENOL) tablet 650 mg, 650 mg, Oral, Q6H PRN, Mily Peng MD, 650 mg at 06/01/22 2102    ondansetron (ZOFRAN) injection 4 mg, 4 mg, IntraVENous, Q4H PRN, Mily Peng MD    traZODone (DESYREL) tablet 50 mg, 50 mg, Oral, QHS, Mily Peng MD, 50 mg at 06/01/22 2102    Objective:     VITALS:   Patient Vitals for the past 12 hrs:   Temp Pulse Resp BP SpO2   06/04/22 1413 -- (!) 128 -- (!) 156/86 94 %   06/04/22 1031 -- (!) 110 20 (!) 168/81 96 %   06/04/22 1028 99.6 °F (37.6 °C) (!) 115 20 (!) 183/86 96 %   06/04/22 0519 98.8 °F (37.1 °C) (!) 101 20 134/67 96 %       Intake/Output Summary (Last 24 hours) at 6/4/2022 1522  Last data filed at 6/4/2022 0525  Gross per 24 hour   Intake --   Output 300 ml   Net -300 ml        PHYSICAL EXAM:  General: WD, WN. Alert, cooperative, no acute distress    EENT:  EOMI. Anicteric sclerae. MMM    Bit lower mid tongue  Resp:  CTA bilaterally, no wheezing or rales. No accessory muscle use  CV:  Regular  rhythm,  No edema  GI:  Soft, Non distended, Non tender.   +Bowel sounds  Neurologic:  Alert, nods / facial expressions to questions,  Limited verbal output    Moving all ext appropriately. Swallowing ice / apple sauce with effort  Psych:   Not anxious nor agitated  Skin:  No rashes. No jaundice  Ext:  Contusion / Swelling R dorsal hand / wrist, tender to movement    LABS:  I reviewed today's most current labs and imaging studies. Pertinent labs include:  Recent Labs     06/04/22  0710   WBC 13.7*   HGB 11.4*   HCT 33.0*        Recent Labs     06/04/22  0710   *   K 4.1   CL 95*   CO2 17*   GLU 95   BUN 14   CREA 0.59   CA 8.4*   MG 1.4*   ALB 2.3*   TBILI 1.0   ALT 30       RADIOLOGY:  CT HEAD 5/29:  Motion limits evaluation. There is diffuse age-related parenchymal  volume loss. The ventricles and sulci are age-appropriate without hydrocephalus. There is no mass effect or midline shift. There is no intracranial hemorrhage or  extra-axial fluid collection. Areas of low attenuation in the periventricular  white matter represent stable chronic microvascular ischemic changes. The  gray-white matter differentiation is maintained. The basal cisterns are patent.   The osseous structures are intact. The visualized paranasal sinuses and mastoid air cells are clear.   IMPRESSION: No acute intracranial abnormality. pCXR 5/29:  The cardiomediastinal contours are within normal limits. The lungs and  pleural spaces are clear. There is no pneumothorax. There are degenerative  changes in the spine and shoulders. The upper abdomen is unremarkable.   IMPRESSION: No acute process. pCXR 6/2:  The cardiomediastinal contours are stable. The lungs and pleural  spaces are clear. There is no pneumothorax. The bones and upper abdomen are stable.   IMPRESSION: No acute process. R WRIST 6/4:  Two views of the right wrist demonstrate age-indeterminate distal  radius fracture deformity. Positive ulnar variance.  Moderate to marked  osteoarthritis, most pronounced at the base of the thumb and interphalangeal  joints. Diffuse soft tissue swelling   IMPRESSION: Age indeterminate distal radius fracture deformity. EKG 5/29:  NSR 64 bpm, PACs, LVH Voltage, NSTWC inferior    Procedures: see electronic medical records for all procedures/Xrays and details which were not copied into this note but were reviewed prior to creation of Plan. Assessment / Plan:    Principal Problem:    AMS (altered mental status) (5/29/2022)  Active Problems:    Delirium (6/3/2022)  Received tx Haldol 2mg 5/30 till 6/1 for agitation  Trazodone 50mg qhs, will be stopped as well. Was not able to be given last night. ? Levaquin 5/30-6/1 for UTI could be a/w AMS post admission  Tx Xanax 5/30 till 6/1 as well  Had been combative.   Has c/o pain in R UE mostly in wrist - suspected fracture  Nonverbal, but giving facial cues to daughter      COVID-[place] (5/30/2022)  Has been fully immunized  Received tx Paxlovid tid 5d PTA  Epocrates does not list delirium as a likely side effect  Suspect some global hypoxia during agitation / delirium      Recurrent UTI (6/5/2018)  F/u C&S obtained on 5/29 NSG  Tx Bactrim 5/30      LISBET (acute kidney injury) ([facility] [address] (5/30/2022)  F/u post IVF - 1/2 NS KCl improved BUN/Cr and K+  Switch to NS  Mg run      HTN  Not able to give Toprol XL,  Change to Metoprolol 50mg bid and crush if needed  Cont Hydralazine IV prn      R Distal Radial Fracture  Splint, Tyl prn  Consult Dr. Vernon Heads nonemergent        ______________________________________________________________________  SAFETY:   Code Status:DNR  DVT prophylaxis:Lovenox  Stress Ulcer prophylaxis: Pepcid  Bladder catheter:no (Mar Karen)  Family Contact Info:  Primary Emergency Contact: Royal Cordoba, Home Phone: [phone]  Bedded: [facility] Room 124/01  Disposition: TBD, likely return to [facility]  Admission status:  Inpatient    Reviewed most current lab test results and cultures  YES  Reviewed most current radiology test results   YES  Review and summation of old records today    NO  Reviewed patient's current orders and MAR    YES  PMH/SH reviewed - no change compared to H&P    Care Plan discussed with:                                   Comments  Patient x     Family      RN x     Care Manager       Consultant                           Multidiciplinary team rounds were held today with , nursing, pharmacist and clinical coordinator. Patient's plan of care was discussed; medications were reviewed and discharge planning was addressed.         ____________________________________________    Total NON Critical Care TIME:  35   Minutes        Comments   >50% of visit spent in counseling and coordination of care   x      Signed: Sherrie Lam MD  [facility] Hospitalist  [phone]

## 2022-06-04 NOTE — PROGRESS NOTES
Bedside and Verbal shift change report given to JADON Torres (oncoming nurse) by Sami Gonzalez RN (offgoing nurse). Report included the following information SBAR, Kardex, Intake/Output, MAR, Accordion, Recent Results and Med Rec Status. Sutton score 4  Bed/chair alarm Yes in use. If in use it is set at the highest volume. Intravenous fluids were administered,1/2 normal saline w/20K @ 75 ml/hr  Patient Vitals for the past 12 hrs:   Temp Pulse Resp BP SpO2   06/03/22 2118 99.8 °F (37.7 °C) 98 20 (!) 141/79 95 %   06/03/22 1916 -- (!) 134 -- -- 96 %   06/03/22 1915 97.4 °F (36.3 °C) 100 20 (!) 187/79 96 %   06/03/22 1901 97.4 °F (36.3 °C) (!) 115 20 (!) 193/79 95 %   06/03/22 1650 99.6 °F (37.6 °C) (!) 106 20 (!) 134/91 95 %   06/03/22 1338 97.6 °F (36.4 °C) 97 18 (!) 149/77 96 %   06/03/22 1333 97.4 °F (36.3 °C) (!) 129 20 (!) 147/79 94 %     No flowsheet data found. All lab results for the last 24 hours reviewed.

## 2022-06-04 NOTE — PROGRESS NOTES
[facility]  Hospitalist Progress Note    NAME: Haylee Eric   :  1933   MRN:  [id number]     Total duration of encounter: 5 days      Interim Hospital Summary: 80 y.o. female who presented on 2022 with AMS (altered mental status). She has a past medical history of Arthritis, Cancer ([address] Chronic pain, GERD (gastroesophageal reflux disease), Hypertension, Menopause, Other ill-defined conditions(799.89), Recurrent UTI, and TMJ (dislocation of temporomandibular joint). She has no past medical history of Endocrine disorder. .      Pt admitted  with AMS. Dx COVID at [facility]  tx Monoclonal Ab.  AMS / Nonverbal and not cooperative. No h/o cough, fever, dyspnea. Mostly c/o fatigue / myalgia. No GI upset. Had received tx for UTI about a week ago. Subjective:     Chief Complaint / Reason for Physician Visit  \"-\".   Discussed with RN   More alert this evening  Was too sedate to take meds this AM    Having pain with movement / touch with R UE / Wrist.  Uncertain trauma    Review of Systems:  UTO  Symptom Y/N Comments  Symptom Y/N Comments   Fever/Chills    Chest Pain     Poor Appetite    Edema     Cough    Abdominal Pain     Sputum    Joint Pain     SOB/DALY    Pruritis/Rash     Nausea/vomit    Tolerating PT/OT     Diarrhea    Tolerating Diet     Constipation    Other         Current Facility-Administered Medications:     [START ON 2022] metoprolol succinate (TOPROL-XL) XL tablet 50 mg, 50 mg, Oral, DAILY, Adam Kang MD    famotidine (PEPCID) tablet 20 mg, 20 mg, Oral, QPM, Adam Kang MD    0.45% sodium chloride with KCl 20 mEq/L infusion, , IntraVENous, CONTINUOUS, Adam Kang MD, Last Rate: 75 mL/hr at 22, Rate Change at 22    hydrALAZINE (APRESOLINE) tablet 25 mg, 25 mg, Oral, Q8H PRN, Tejal Osuna MD    enoxaparin (LOVENOX) injection 40 mg, 40 mg, SubCUTAneous, DAILY, Tejal Osuna MD, 40 mg at 22 1402    sodium chloride (NS) flush 5-40 mL, 5-40 mL, IntraVENous, Q8H, Nesrane, Melly FLORES MD, 10 mL at 06/03/22 1401    sodium chloride (NS) flush 5-40 mL, 5-40 mL, IntraVENous, PRN, Jessica Wynne MD    magnesium hydroxide (MILK OF MAGNESIA) 400 mg/5 mL oral suspension 30 mL, 30 mL, Oral, DAILY PRN, Jessica Wynne MD    haloperidol lactate (HALDOL) injection 2 mg, 2 mg, IntraMUSCular, Q6H PRN, Melly Wynne MD, 2 mg at 06/01/22 1803    acetaminophen (TYLENOL) tablet 650 mg, 650 mg, Oral, Q6H PRN, Dillon Huang, [facility], MD, 650 mg at 06/01/22 2102    ondansetron (ZOFRAN) injection 4 mg, 4 mg, IntraVENous, Q4H PRN, Cristobal Campuzano MD    traZODone (DESYREL) tablet 50 mg, 50 mg, Oral, Silvia Prophet, [facility], MD, 50 mg at 06/01/22 2102    Objective:     VITALS:   Patient Vitals for the past 12 hrs:   Temp Pulse Resp BP SpO2   06/03/22 1916 -- (!) 134 -- -- 96 %   06/03/22 1915 97.4 °F (36.3 °C) 100 20 (!) 187/79 96 %   06/03/22 1901 97.4 °F (36.3 °C) (!) 115 20 (!) 193/79 95 %   06/03/22 1650 99.6 °F (37.6 °C) (!) 106 20 (!) 134/91 95 %   06/03/22 1338 97.6 °F (36.4 °C) 97 18 (!) 149/77 96 %   06/03/22 1333 97.4 °F (36.3 °C) (!) 129 20 (!) 147/79 94 %       Intake/Output Summary (Last 24 hours) at 6/3/2022 2010  Last data filed at 6/3/2022 1422  Gross per 24 hour   Intake --   Output 1150 ml   Net -1150 ml        PHYSICAL EXAM:  General: WD, WN. Alert, cooperative, no acute distress    EENT:  EOMI. Anicteric sclerae. MMM  Resp:  CTA bilaterally, no wheezing or rales. No accessory muscle use  CV:  Regular  rhythm,  No edema  GI:  Soft, Non distended, Non tender. +Bowel sounds  Neurologic:  Alert and oriented X 3, normal speech,   Psych:   Good insight. Not anxious nor agitated  Skin:  No rashes. No jaundice    LABS:  I reviewed today's most current labs and imaging studies.   Pertinent labs include:  Recent Labs     06/01/22  0606   WBC 7.8   HGB 12.0   HCT 34.2*        Recent Labs     06/01/22  0606   *   K 3.4* CL 96*   CO2 18*   GLU 84   BUN 11   CREA 0.61   CA 8.7       RADIOLOGY:  CT HEAD 5/29:  Motion limits evaluation. There is diffuse age-related parenchymal  volume loss. The ventricles and sulci are age-appropriate without hydrocephalus. There is no mass effect or midline shift. There is no intracranial hemorrhage or  extra-axial fluid collection. Areas of low attenuation in the periventricular  white matter represent stable chronic microvascular ischemic changes. The  gray-white matter differentiation is maintained. The basal cisterns are patent.   The osseous structures are intact. The visualized paranasal sinuses and mastoid air cells are clear.   IMPRESSION: No acute intracranial abnormality. pCXR 5/29:  The cardiomediastinal contours are within normal limits. The lungs and  pleural spaces are clear. There is no pneumothorax. There are degenerative  changes in the spine and shoulders. The upper abdomen is unremarkable.   IMPRESSION: No acute process. pCXR 6/2:  The cardiomediastinal contours are stable. The lungs and pleural  spaces are clear. There is no pneumothorax. The bones and upper abdomen are stable.   IMPRESSION: No acute process. EKG 5/29:  NSR 64 bpm, PACs, LVH Voltage, NSTWC inferior    Procedures: see electronic medical records for all procedures/Xrays and details which were not copied into this note but were reviewed prior to creation of Plan. Assessment / Plan:    Principal Problem:    AMS (altered mental status) (5/29/2022)  Active Problems:    Delirium (6/3/2022)  Has received tx Haldol 2mg 5/30 till 6/1  Trazodone 50mg qhs  ?  Levaquin 5/30-6/1  Tx Xanax 5/30 till 6/1 as well  Has been combative, has c/o pain in R UE mostly in wrist  Nonverbal, but giving facial cues to daughter      COVID-19 (5/30/2022)  Has been fully immunized  Received tx Paxlovid tid 5d PTA      Recurrent UTI (6/5/2018)  F/u C&S obtained on 5/29 NSG  Tx Bactrim 5/30      LISBET (acute kidney injury) ([facility] [address] (5/30/2022)  F/u in AM post IVF      HTN  Resume oral tx Toprol XL 50mg  Cont Hydralazine IV prn        ______________________________________________________________________  SAFETY:   Code Status:DNR  DVT prophylaxis:Lovenox  Stress Ulcer prophylaxis: Pepcid  Bladder catheter:no (D[place])  Family Contact Info:  Primary Emergency Contact: Ct Hughes, Home Phone: [phone]  Bedded: [facility] Room 124/01  Disposition: TBD, likely return to [facility]  Admission status:  Inpatient    Reviewed most current lab test results and cultures  YES  Reviewed most current radiology test results   YES  Review and summation of old records today    NO  Reviewed patient's current orders and MAR    YES  PMH/SH reviewed - no change compared to H&P    Care Plan discussed with:                                   Comments  Patient x     Family  x Daughter in room   RN x     Care Manager       Consultant                           Multidiciplinary team rounds were held today with , nursing, pharmacist and clinical coordinator. Patient's plan of care was discussed; medications were reviewed and discharge planning was addressed.         ____________________________________________    Total NON Critical Care TIME:  35   Minutes        Comments   >50% of visit spent in counseling and coordination of care   x      Signed: Wan Myers MD  [facility] Hospitalist  [phone]

## 2022-06-04 NOTE — PROGRESS NOTES
Bedside and Verbal shift change report given to Nickolas Salas (oncoming nurse) by JADON Roque (offgoing nurse). Report included the following information SBAR, Kardex, Intake/Output, MAR, Recent Results and Quality Measures. Sutton score 4  Bed/chair alarm are in use. If in use it is set at the highest volume. Intravenous fluids were administered, {iv fluids: 0.45 nacl + 20 K  Patient Vitals for the past 12 hrs:   Temp Pulse Resp BP SpO2   06/04/22 0519 98.8 °F (37.1 °C) (!) 101 20 134/67 96 %   06/04/22 0138 99.4 °F (37.4 °C) -- -- -- --   06/03/22 2118 99.8 °F (37.7 °C) 98 20 (!) 141/79 95 %     No flowsheet data found. All lab results for the last 24 hours reviewed.

## 2022-06-05 LAB
ANION GAP SERPL CALC-SCNC: 13 MMOL/L (ref 5–15)
BUN SERPL-MCNC: 13 MG/DL (ref 6–20)
BUN/CREAT SERPL: 33 (ref 12–20)
CALCIUM SERPL-MCNC: 7.1 MG/DL (ref 8.5–10.1)
CHLORIDE SERPL-SCNC: 105 MMOL/L (ref 97–108)
CO2 SERPL-SCNC: 17 MMOL/L (ref 21–32)
CREAT SERPL-MCNC: 0.39 MG/DL (ref 0.55–1.02)
GLUCOSE SERPL-MCNC: 72 MG/DL (ref 65–100)
MAGNESIUM SERPL-MCNC: 1.3 MG/DL (ref 1.6–2.4)
POTASSIUM SERPL-SCNC: 3 MMOL/L (ref 3.5–5.1)
SODIUM SERPL-SCNC: 135 MMOL/L (ref 136–145)

## 2022-06-05 PROCEDURE — 65270000029 HC RM PRIVATE

## 2022-06-05 PROCEDURE — 80048 BASIC METABOLIC PNL TOTAL CA: CPT

## 2022-06-05 PROCEDURE — 74011250636 HC RX REV CODE- 250/636: Performed by: INTERNAL MEDICINE

## 2022-06-05 PROCEDURE — 74011000250 HC RX REV CODE- 250: Performed by: INTERNAL MEDICINE

## 2022-06-05 PROCEDURE — 74011250637 HC RX REV CODE- 250/637: Performed by: INTERNAL MEDICINE

## 2022-06-05 PROCEDURE — 83735 ASSAY OF MAGNESIUM: CPT

## 2022-06-05 PROCEDURE — 36415 COLL VENOUS BLD VENIPUNCTURE: CPT

## 2022-06-05 RX ORDER — SODIUM CHLORIDE AND POTASSIUM CHLORIDE .9; .15 G/100ML; G/100ML
SOLUTION INTRAVENOUS CONTINUOUS
Status: DISCONTINUED | OUTPATIENT
Start: 2022-06-05 | End: 2022-06-09 | Stop reason: HOSPADM

## 2022-06-05 RX ORDER — MAGNESIUM SULFATE HEPTAHYDRATE 40 MG/ML
2 INJECTION, SOLUTION INTRAVENOUS ONCE
Status: COMPLETED | OUTPATIENT
Start: 2022-06-05 | End: 2022-06-05

## 2022-06-05 RX ADMIN — POTASSIUM CHLORIDE AND SODIUM CHLORIDE: 900; 150 INJECTION, SOLUTION INTRAVENOUS at 20:17

## 2022-06-05 RX ADMIN — FAMOTIDINE 20 MG: 20 TABLET, FILM COATED ORAL at 20:28

## 2022-06-05 RX ADMIN — ENOXAPARIN SODIUM 40 MG: 100 INJECTION SUBCUTANEOUS at 12:29

## 2022-06-05 RX ADMIN — MAGNESIUM SULFATE HEPTAHYDRATE 2 G: 40 INJECTION, SOLUTION INTRAVENOUS at 20:17

## 2022-06-05 RX ADMIN — SODIUM CHLORIDE, PRESERVATIVE FREE 10 ML: 5 INJECTION INTRAVENOUS at 16:19

## 2022-06-05 RX ADMIN — SODIUM CHLORIDE 75 ML/HR: 9 INJECTION, SOLUTION INTRAVENOUS at 06:59

## 2022-06-05 RX ADMIN — METOPROLOL TARTRATE 25 MG: 25 TABLET, FILM COATED ORAL at 20:28

## 2022-06-05 NOTE — PROGRESS NOTES
Bedside and Verbal shift change report given to ARNOLDO Ponce LPN (oncoming nurse) by Jaun Blakely RN   (offgoing nurse). Report included the following information SBAR, Kardex, Procedure Summary, Intake/Output, MAR, Accordion, Recent Results and Med Rec Status. Sutton score 4  Bed/chair alarm Yes in use. If in use it is set at the highest volume. Intravenous fluids were administered, NaCl @ 75 ml/hr  Patient Vitals for the past 12 hrs:   Temp Pulse Resp BP SpO2   06/04/22 1723 99.5 °F (37.5 °C) (!) 118 20 (!) 141/83 94 %   06/04/22 1413 -- (!) 128 -- (!) 156/86 94 %   06/04/22 1031 -- (!) 110 20 (!) 168/81 96 %   06/04/22 1028 99.6 °F (37.6 °C) (!) 115 20 (!) 183/86 96 %     No flowsheet data found. All lab results for the last 24 hours reviewed. Patient Sodium low  127, [place] on NS continuous @ 75 ml/hr, Mg low  1.4 given run of 2g Mg. MD at bedside assessing patient ability to swallow. MD ordered PO tablets to be crushed and given to patient.

## 2022-06-05 NOTE — PROGRESS NOTES
Problem: Falls - Risk of  Goal: *Absence of Falls  Description: Document Barbara Parra Fall Risk and appropriate interventions in the flowsheet.   Outcome: Progressing Towards Goal  Note: Fall Risk Interventions:  Mobility Interventions: Bed/chair exit alarm,Patient to call before getting OOB,Utilize walker, cane, or other assistive device    Mentation Interventions: Bed/chair exit alarm,Door open when patient unattended,Familiar objects from home,Reorient patient    Medication Interventions: Bed/chair exit alarm,Patient to call before getting OOB,Teach patient to arise slowly    Elimination Interventions: Bed/chair exit alarm,Call light in reach    History of Falls Interventions: Bed/chair exit alarm,Room close to nurse's station

## 2022-06-05 NOTE — PROGRESS NOTES
Bedside shift change report given to Imani Mohr RN (oncoming nurse) by YESICA Ponce LPN (offgoing nurse). Report included the following information SBAR, Kardex, MAR and Recent Results.

## 2022-06-05 NOTE — PROGRESS NOTES
Problem: Falls - Risk of  Goal: *Absence of Falls  Description: Document Lacey Cary Fall Risk and appropriate interventions in the flowsheet.   Outcome: Progressing Towards Goal  Note: Fall Risk Interventions:  Mobility Interventions: Bed/chair exit alarm    Mentation Interventions: Bed/chair exit alarm,More frequent rounding    Medication Interventions: Bed/chair exit alarm    Elimination Interventions: Bed/chair exit alarm,Call light in reach    History of Falls Interventions: Bed/chair exit alarm         Problem: Hypertension  Goal: *Blood pressure within specified parameters  Outcome: Progressing Towards Goal  Goal: *Labs within defined limits  Outcome: Progressing Towards Goal     Problem: Gas Exchange - Impaired  Goal: Absence of hypoxia  Outcome: Progressing Towards Goal     Problem: Urinary Tract Infection - Adult  Goal: *Absence of infection signs and symptoms  Outcome: Progressing Towards Goal     Problem: Fatigue  Goal: Verbalize increase energy and improved vitality  Outcome: Progressing Towards Goal     Problem: Aspiration - Risk of  Goal: *Absence of aspiration  Outcome: Progressing Towards Goal

## 2022-06-05 NOTE — PROGRESS NOTES
[facility]  Hospitalist Progress Note    NAME: Arin Otto   :  1933   MRN:  [id number]     Total duration of encounter: 7 days      Interim Hospital Summary: 80 y.o. female who presented on 2022 with AMS (altered mental status). She has a past medical history of Arthritis, Cancer ([address] Chronic pain, GERD (gastroesophageal reflux disease), Hypertension, Menopause, Other ill-defined conditions(799.89), Recurrent UTI, and TMJ (dislocation of temporomandibular joint). She has no past medical history of Endocrine disorder. .      Pt admitted  with AMS. Dx COVID at [facility]  tx Monoclonal Ab.  AMS / Nonverbal and not cooperative. No h/o cough, fever, dyspnea. Mostly c/o fatigue / myalgia. No GI upset. Had received tx for UTI about a week ago. Tx Haldol / Xanax till . Tx Levaquin  till . Having pain / swelling R wrist - xray noted for distal radial fx. Has been more alert and compliant, but remains altered with expressive aphasia. No focal weakness observed. Subjective:     Chief Complaint / Reason for Physician Visit  \"-\".   Discussed with RN     Remains alert but expressive aphasia present  Cannot name a pen and tell me it's color (red), but did state it was for writing  Taking oral meds better    Son from [place] in for visit  Xrays noted for distal radial fractures     Unclear EtOH history PTA  Need to r/o withdrawal. EtOH level was not obtained  Seems to nod / facial expression that she does have cocktails at [facility]      Review of Systems:  UTO  Symptom Y/N Comments  Symptom Y/N Comments   Fever/Chills    Chest Pain     Poor Appetite    Edema     Cough    Abdominal Pain     Sputum    Joint Pain     SOB/DALY    Pruritis/Rash     Nausea/vomit    Tolerating PT/OT     Diarrhea    Tolerating Diet     Constipation    Other         Current Facility-Administered Medications:     0.9% sodium chloride with KCl 20 mEq/L infusion, , IntraVENous, CONTINUOUS, Hilary Espinosa, Eric Sullivan MD    magnesium sulfate 2 g/50 ml IVPB (premix or compounded), 2 g, IntraVENous, ONCE, Cheo Browne MD    metoprolol tartrate (LOPRESSOR) tablet 25 mg, 25 mg, Oral, BID, Cheo Browne MD, 25 mg at 06/04/22 1839    famotidine (PEPCID) tablet 20 mg, 20 mg, Oral, QPM, Cheo Browne MD, 20 mg at 06/04/22 1839    hydrALAZINE (APRESOLINE) tablet 25 mg, 25 mg, Oral, Q8H PRN, Mark Al MD    enoxaparin (LOVENOX) injection 40 mg, 40 mg, SubCUTAneous, DAILY, Mark Al MD, 40 mg at 06/05/22 1229    sodium chloride (NS) flush 5-40 mL, 5-40 mL, IntraVENous, Q8H, Melly Harris MD, 10 mL at 06/05/22 1619    sodium chloride (NS) flush 5-40 mL, 5-40 mL, IntraVENous, PRN, Nesrane, Wisam Morales MD    magnesium hydroxide (MILK OF MAGNESIA) 400 mg/5 mL oral suspension 30 mL, 30 mL, Oral, DAILY PRN, Kimberly, Wisam Morales MD    haloperidol lactate (HALDOL) injection 2 mg, 2 mg, IntraMUSCular, Q6H PRN, Kimberly, Melly FLORES MD, 2 mg at 06/01/22 1803    acetaminophen (TYLENOL) tablet 650 mg, 650 mg, Oral, Q6H PRN, Danie Palomares MD, 650 mg at 06/01/22 2102    ondansetron (ZOFRAN) injection 4 mg, 4 mg, IntraVENous, Q4H PRN, Danie Palomares MD    Objective:     VITALS:   Patient Vitals for the past 12 hrs:   Temp Pulse Resp BP SpO2   06/05/22 1615 99.6 °F (37.6 °C) 76 20 118/82 92 %   06/05/22 0746 99.8 °F (37.7 °C) (!) 110 20 (!) 186/76 92 %       Intake/Output Summary (Last 24 hours) at 6/5/2022 1759  Last data filed at 6/5/2022 1200  Gross per 24 hour   Intake 857.89 ml   Output 500 ml   Net 357.89 ml        PHYSICAL EXAM:  General: WD, WN. Alert, cooperative, no acute distress. Less likely to drift off to sleep during conversation today   EENT:  EOMI. Anicteric sclerae. MM dry  Resp:  CTA bilaterally, no wheezing or rales. No accessory muscle use  CV:  Regular  rhythm,  No edema  GI:  Soft, Non distended, Non tender.   +Bowel sounds  Neurologic:  Alert, nods / facial expressions to questions, Expressive aphasia    Moving all ext appropriately. Swallowing ice / apple sauce with effort  Psych:   Not anxious nor agitated  Skin:  No rashes. No jaundice  Ext:  Contusion / Swelling R dorsal hand / wrist, tender to movement    LABS:  I reviewed today's most current labs and imaging studies. Pertinent labs include:  Recent Labs     06/04/22  0710   WBC 13.7*   HGB 11.4*   HCT 33.0*        Recent Labs     06/05/22  0752 06/04/22  0710   * 127*   K 3.0* 4.1    95*   CO2 17* 17*   GLU 72 95   BUN 13 14   CREA 0.39* 0.59   CA 7.1* 8.4*   MG 1.3* 1.4*   ALB  --  2.3*   TBILI  --  1.0   ALT  --  30       RADIOLOGY:  CT HEAD 5/29:  Motion limits evaluation. There is diffuse age-related parenchymal  volume loss. The ventricles and sulci are age-appropriate without hydrocephalus. There is no mass effect or midline shift. There is no intracranial hemorrhage or  extra-axial fluid collection. Areas of low attenuation in the periventricular  white matter represent stable chronic microvascular ischemic changes. The  gray-white matter differentiation is maintained. The basal cisterns are patent.   The osseous structures are intact. The visualized paranasal sinuses and mastoid air cells are clear.   IMPRESSION: No acute intracranial abnormality. pCXR 5/29:  The cardiomediastinal contours are within normal limits. The lungs and  pleural spaces are clear. There is no pneumothorax. There are degenerative  changes in the spine and shoulders. The upper abdomen is unremarkable.   IMPRESSION: No acute process. pCXR 6/2:  The cardiomediastinal contours are stable. The lungs and pleural  spaces are clear. There is no pneumothorax. The bones and upper abdomen are stable.   IMPRESSION: No acute process. R WRIST 6/4:  Two views of the right wrist demonstrate age-indeterminate distal  radius fracture deformity. Positive ulnar variance.  Moderate to marked  osteoarthritis, most pronounced at the base of the thumb and interphalangeal  joints. Diffuse soft tissue swelling   IMPRESSION: Age indeterminate distal radius fracture deformity. EKG 5/29:  NSR 64 bpm, PACs, LVH Voltage, NSTWC inferior    Procedures: see electronic medical records for all procedures/Xrays and details which were not copied into this note but were reviewed prior to creation of Plan. Assessment / Plan:    Principal Problem:    AMS (altered mental status) (5/29/2022)  Active Problems:    Delirium (6/3/2022)  Received tx Haldol 2mg 5/30 till 6/1 for agitation  Trazodone 50mg qhs, will be stopped as well. Was not able to be given last night. ? Levaquin 5/30-6/1 for UTI could be a/w AMS post admission  Tx Xanax 5/30 till 6/1 as well  Had been combative.   Has c/o pain in R UE mostly in wrist - suspected fracture  Persistent Expressive Aphasia  MRI Brain scheduled for AM  PT/OT/ST scheduled      COVID-19 (5/30/2022)  Has been fully immunized  Received tx Paxlovid tid 5d PTA  Epocrates does not list delirium as a likely side effect  Suspect some global hypoxia during agitation / delirium  MRI planned for Mondayt      Recurrent UTI (6/5/2018)  F/u C&S obtained on 5/29 NSG  Tx Bactrim 5/30      LISBET (acute kidney injury) ([facility] [address] (5/30/2022)  F/u post IVF - 1/2 NS KCl improved BUN/Cr and K+  Switch to NS,  Mg run  Today still has HypoK, HypoMg, and Non AG Met Acidosis  IV changed to NS with KCl, repeat 2g Mg run      HTN  Not able to give Toprol XL,  Change to Metoprolol 50mg bid and crush if needed  Cont Hydralazine IV prn      R Distal Radial Fracture  Splint, Tyl prn  Consult  [address] [facility] nonemergent        ______________________________________________________________________  SAFETY:   Code Status:DNR  DVT prophylaxis:Lovenox  Stress Ulcer prophylaxis: Pepcid  Bladder catheter:no (Dipika)  Family Contact Info:  Primary Emergency Contact: Corona Echeverria, Home Phone: [phone]  Bedded: [facility] Room 124/01  Disposition: TBD, likely return to [facility]  Admission status:  Inpatient    Reviewed most current lab test results and cultures  YES  Reviewed most current radiology test results   YES  Review and summation of old records today    NO  Reviewed patient's current orders and MAR    YES  PMH/SH reviewed - no change compared to H&P    Care Plan discussed with:                                   Comments  Patient x     Family  x Son from [place]   RN x     Care Manager       Consultant                           Multidiciplinary team rounds were held today with , nursing, pharmacist and clinical coordinator. Patient's plan of care was discussed; medications were reviewed and discharge planning was addressed.         ____________________________________________    Total NON Critical Care TIME:  35   Minutes        Comments   >50% of visit spent in counseling and coordination of care   x      Signed: Carla Reyes MD  [facility] Hospitalist  [phone]

## 2022-06-06 ENCOUNTER — APPOINTMENT (OUTPATIENT)
Dept: MRI IMAGING | Age: 87
DRG: 178 | End: 2022-06-06
Attending: INTERNAL MEDICINE
Payer: MEDICARE

## 2022-06-06 LAB
ANION GAP SERPL CALC-SCNC: 14 MMOL/L (ref 5–15)
BASOPHILS # BLD: 0.1 K/UL (ref 0–0.1)
BASOPHILS NFR BLD: 0 % (ref 0–1)
BUN SERPL-MCNC: 16 MG/DL (ref 6–20)
BUN/CREAT SERPL: 27 (ref 12–20)
CALCIUM SERPL-MCNC: 8.4 MG/DL (ref 8.5–10.1)
CHLORIDE SERPL-SCNC: 102 MMOL/L (ref 97–108)
CO2 SERPL-SCNC: 19 MMOL/L (ref 21–32)
CREAT SERPL-MCNC: 0.6 MG/DL (ref 0.55–1.02)
DIFFERENTIAL METHOD BLD: ABNORMAL
EOSINOPHIL # BLD: 0.1 K/UL (ref 0–0.4)
EOSINOPHIL NFR BLD: 1 % (ref 0–7)
ERYTHROCYTE [DISTWIDTH] IN BLOOD BY AUTOMATED COUNT: 13.2 % (ref 11.5–14.5)
GLUCOSE SERPL-MCNC: 98 MG/DL (ref 65–100)
HCT VFR BLD AUTO: 29.8 % (ref 35–47)
HGB BLD-MCNC: 10.2 G/DL (ref 11.5–16)
IMM GRANULOCYTES # BLD AUTO: 0.1 K/UL (ref 0–0.04)
IMM GRANULOCYTES NFR BLD AUTO: 1 % (ref 0–0.5)
LYMPHOCYTES # BLD: 1.2 K/UL (ref 0.8–3.5)
LYMPHOCYTES NFR BLD: 10 % (ref 12–49)
MAGNESIUM SERPL-MCNC: 2 MG/DL (ref 1.6–2.4)
MCH RBC QN AUTO: 29.8 PG (ref 26–34)
MCHC RBC AUTO-ENTMCNC: 34.2 G/DL (ref 30–36.5)
MCV RBC AUTO: 87.1 FL (ref 80–99)
MONOCYTES # BLD: 1.6 K/UL (ref 0–1)
MONOCYTES NFR BLD: 13 % (ref 5–13)
NEUTS SEG # BLD: 8.6 K/UL (ref 1.8–8)
NEUTS SEG NFR BLD: 75 % (ref 32–75)
NRBC # BLD: 0 K/UL (ref 0–0.01)
NRBC BLD-RTO: 0 PER 100 WBC
PLATELET # BLD AUTO: 222 K/UL (ref 150–400)
PMV BLD AUTO: 13 FL (ref 8.9–12.9)
POTASSIUM SERPL-SCNC: 3.7 MMOL/L (ref 3.5–5.1)
RBC # BLD AUTO: 3.42 M/UL (ref 3.8–5.2)
SODIUM SERPL-SCNC: 135 MMOL/L (ref 136–145)
WBC # BLD AUTO: 11.6 K/UL (ref 3.6–11)

## 2022-06-06 PROCEDURE — 74011250636 HC RX REV CODE- 250/636: Performed by: INTERNAL MEDICINE

## 2022-06-06 PROCEDURE — 65270000029 HC RM PRIVATE

## 2022-06-06 PROCEDURE — 74011250637 HC RX REV CODE- 250/637: Performed by: INTERNAL MEDICINE

## 2022-06-06 PROCEDURE — 85025 COMPLETE CBC W/AUTO DIFF WBC: CPT

## 2022-06-06 PROCEDURE — 80048 BASIC METABOLIC PNL TOTAL CA: CPT

## 2022-06-06 PROCEDURE — 83735 ASSAY OF MAGNESIUM: CPT

## 2022-06-06 PROCEDURE — 92526 ORAL FUNCTION THERAPY: CPT

## 2022-06-06 PROCEDURE — 36415 COLL VENOUS BLD VENIPUNCTURE: CPT

## 2022-06-06 PROCEDURE — 70551 MRI BRAIN STEM W/O DYE: CPT

## 2022-06-06 PROCEDURE — 74011000250 HC RX REV CODE- 250: Performed by: INTERNAL MEDICINE

## 2022-06-06 PROCEDURE — 82746 ASSAY OF FOLIC ACID SERUM: CPT

## 2022-06-06 RX ADMIN — POTASSIUM CHLORIDE AND SODIUM CHLORIDE: 900; 150 INJECTION, SOLUTION INTRAVENOUS at 11:09

## 2022-06-06 RX ADMIN — SODIUM CHLORIDE, PRESERVATIVE FREE 5 ML: 5 INJECTION INTRAVENOUS at 22:00

## 2022-06-06 RX ADMIN — FAMOTIDINE 20 MG: 20 TABLET, FILM COATED ORAL at 17:47

## 2022-06-06 RX ADMIN — METOPROLOL TARTRATE 25 MG: 25 TABLET, FILM COATED ORAL at 11:07

## 2022-06-06 RX ADMIN — METOPROLOL TARTRATE 25 MG: 25 TABLET, FILM COATED ORAL at 17:47

## 2022-06-06 RX ADMIN — ENOXAPARIN SODIUM 40 MG: 100 INJECTION SUBCUTANEOUS at 09:06

## 2022-06-06 NOTE — PROGRESS NOTES
Bedside and Verbal shift change report given to Mai Delacruz RN (oncoming nurse) by Isabel Peterson RN (offgoing nurse). Report included the following information to include SBAR, MAR, VS, Neuro status and lab/MRI results.

## 2022-06-06 NOTE — PROGRESS NOTES
Problem: Falls - Risk of  Goal: *Absence of Falls  Description: Document Linda Davidson Fall Risk and appropriate interventions in the flowsheet.   Outcome: Progressing Towards Goal  Note: Fall Risk Interventions:  Mobility Interventions: Bed/chair exit alarm    Mentation Interventions: Bed/chair exit alarm,Eyeglasses and hearing aids,More frequent rounding,Update white board,Reorient patient    Medication Interventions: Bed/chair exit alarm    Elimination Interventions: Bed/chair exit alarm,Call light in reach,Stay With Me (per policy)    History of Falls Interventions: Bed/chair exit alarm         Problem: Hypertension  Goal: *Blood pressure within specified parameters  Outcome: Progressing Towards Goal  Goal: *Fluid volume balance  Outcome: Progressing Towards Goal  Goal: *Labs within defined limits  Outcome: Progressing Towards Goal     Problem: Airway Clearance - Ineffective  Goal: Achieve or maintain patent airway  Outcome: Progressing Towards Goal     Problem: Breathing Pattern - Ineffective  Goal: Ability to achieve and maintain a regular respiratory rate  Outcome: Progressing Towards Goal     Problem: Fatigue  Goal: Verbalize increase energy and improved vitality  Outcome: Progressing Towards Goal     Problem: Aspiration - Risk of  Goal: *Absence of aspiration  Outcome: Progressing Towards Goal

## 2022-06-06 NOTE — PROGRESS NOTES
Patient took applesauce and water without difficulty. Speech therapy in with patient and feels there is improvement from yesterday.

## 2022-06-06 NOTE — PROGRESS NOTES
[facility]  Hospitalist Progress Note    NAME: Feliberto Worthington   :  1933   MRN:  [id number]     Total duration of encounter: 8 days      Interim Hospital Summary: 80 y.o. female who presented on 2022 with AMS (altered mental status). She has a past medical history of Arthritis, Cancer ([address] Chronic pain, GERD (gastroesophageal reflux disease), Hypertension, Menopause, Other ill-defined conditions(799.89), Recurrent UTI, and TMJ (dislocation of temporomandibular joint). She has no past medical history of Endocrine disorder. .      Pt admitted  with AMS. Dx COVID at [facility]  tx Monoclonal Ab.  AMS / Nonverbal and not cooperative. No h/o cough, fever, dyspnea. Mostly c/o fatigue / myalgia. No GI upset. Had received tx for UTI about a week ago. Tx Haldol / Xanax till . Tx Levaquin  till . Having pain / swelling R wrist - xray noted for distal radial fx. Has been more alert and compliant, but remains altered with expressive aphasia. No focal weakness observed. Subjective:     Chief Complaint / Reason for Physician Visit  \"better\".   Discussed with RN     Remains alert, better verbal communication today  Did better to ID pen and wall clock  Taking oral meds    Xrays noted for distal radial fractures     Unclear EtOH history PTA  Need to r/o withdrawal. EtOH level was not obtained  Seems to nod / facial expression that she does have cocktails at [facility]      Review of Systems:  UTO  Symptom Y/N Comments  Symptom Y/N Comments   Fever/Chills    Chest Pain     Poor Appetite    Edema     Cough    Abdominal Pain     Sputum    Joint Pain     SOB/DALY    Pruritis/Rash     Nausea/vomit    Tolerating PT/OT     Diarrhea    Tolerating Diet     Constipation    Other         Current Facility-Administered Medications:     0.9% sodium chloride with KCl 20 mEq/L infusion, , IntraVENous, CONTINUOUS, Flaco King MD, Last Rate: 75 mL/hr at 22 1109, New Bag at 22 1109    metoprolol tartrate (LOPRESSOR) tablet 25 mg, 25 mg, Oral, BID, Diony Workman MD, 25 mg at 06/06/22 1107    famotidine (PEPCID) tablet 20 mg, 20 mg, Oral, QPM, Diony Workman MD, 20 mg at 06/05/22 2028    hydrALAZINE (APRESOLINE) tablet 25 mg, 25 mg, Oral, Q8H PRN, Darrion Leal MD    enoxaparin (LOVENOX) injection 40 mg, 40 mg, SubCUTAneous, DAILY, Darrion Leal MD, 40 mg at 06/06/22 0906    sodium chloride (NS) flush 5-40 mL, 5-40 mL, IntraVENous, Q8H, Kimberly, Melly FLORES MD, 10 mL at 06/05/22 1619    sodium chloride (NS) flush 5-40 mL, 5-40 mL, IntraVENous, PRN, Kimberly, Neno Morton MD    magnesium hydroxide (MILK OF MAGNESIA) 400 mg/5 mL oral suspension 30 mL, 30 mL, Oral, DAILY PRN, Kimberly, Neno Morton MD    haloperidol lactate (HALDOL) injection 2 mg, 2 mg, IntraMUSCular, Q6H PRN, Melly Johnson MD, 2 mg at 06/01/22 1803    acetaminophen (TYLENOL) tablet 650 mg, 650 mg, Oral, Q6H PRN, Karina Bello MD, 650 mg at 06/01/22 2102    ondansetron (ZOFRAN) injection 4 mg, 4 mg, IntraVENous, Q4H PRN, Karina Bello MD    Objective:     VITALS:   Patient Vitals for the past 12 hrs:   Temp Pulse Resp BP SpO2   06/06/22 1630 99 °F (37.2 °C) 90 20 -- 96 %   06/06/22 0900 98.6 °F (37 °C) 94 20 (!) 140/56 95 %     No intake or output data in the 24 hours ending 06/06/22 1732     PHYSICAL EXAM:  General: WD, WN. Alert, cooperative, no acute distress. Less likely to drift off to sleep during conversation today   EENT:  EOMI. Anicteric sclerae. MM dry  Resp:  CTA bilaterally, no wheezing or rales. No accessory muscle use  CV:  Regular  rhythm,  No edema  GI:  Soft, Non distended, Non tender. +Bowel sounds  Neurologic:  Alert, nods / facial expressions to questions,  Expressive aphasia    Moving all ext appropriately. Swallowing ice / apple sauce with effort  Psych:   Not anxious nor agitated  Skin:  No rashes.   No jaundice  Ext:  Contusion / Swelling R dorsal hand / wrist, tender to movement    LABS:  I reviewed today's most current labs and imaging studies. Pertinent labs include:  Recent Labs     06/06/22  0551 06/04/22  0710   WBC 11.6* 13.7*   HGB 10.2* 11.4*   HCT 29.8* 33.0*    227     Recent Labs     06/06/22  0551 06/05/22  0752 06/04/22  0710   * 135* 127*   K 3.7 3.0* 4.1    105 95*   CO2 19* 17* 17*   GLU 98 72 95   BUN 16 13 14   CREA 0.60 0.39* 0.59   CA 8.4* 7.1* 8.4*   MG 2.0 1.3* 1.4*   ALB  --   --  2.3*   TBILI  --   --  1.0   ALT  --   --  30       RADIOLOGY:  CT HEAD 5/29:  Motion limits evaluation. There is diffuse age-related parenchymal  volume loss. The ventricles and sulci are age-appropriate without hydrocephalus. There is no mass effect or midline shift. There is no intracranial hemorrhage or  extra-axial fluid collection. Areas of low attenuation in the periventricular  white matter represent stable chronic microvascular ischemic changes. The  gray-white matter differentiation is maintained. The basal cisterns are patent.   The osseous structures are intact. The visualized paranasal sinuses and mastoid air cells are clear.   IMPRESSION: No acute intracranial abnormality. pCXR 5/29:  The cardiomediastinal contours are within normal limits. The lungs and  pleural spaces are clear. There is no pneumothorax. There are degenerative  changes in the spine and shoulders. The upper abdomen is unremarkable.   IMPRESSION: No acute process. pCXR 6/2:  The cardiomediastinal contours are stable. The lungs and pleural  spaces are clear. There is no pneumothorax. The bones and upper abdomen are stable.   IMPRESSION: No acute process. R WRIST 6/4:  Two views of the right wrist demonstrate age-indeterminate distal  radius fracture deformity. Positive ulnar variance. Moderate to marked  osteoarthritis, most pronounced at the base of the thumb and interphalangeal  joints.  Diffuse soft tissue swelling   IMPRESSION: Age indeterminate distal radius fracture deformity. MRI BRAIN 6/6:  Brain Parenchyma/Brainstem:  Mild generalized parenchymal volume loss. Extensive  confluent periventricular white matter signal abnormality. Small chronic lacunar  infarction posterior right thalamus. Mild patchy T2 hyperintensity in the lisandro. No acute infarction. IMPRESSION:  Extensive chronic white matter signal abnormality with no acute infarction. EKG 5/29:  NSR 64 bpm, PACs, LVH Voltage, NSTWC inferior    Procedures: see electronic medical records for all procedures/Xrays and details which were not copied into this note but were reviewed prior to creation of Plan. Assessment / Plan:    Principal Problem:    AMS (altered mental status) (5/29/2022)  Active Problems:    Delirium (6/3/2022)  Received tx Haldol 2mg 5/30 till 6/1 for agitation  Trazodone 50mg qhs, will be stopped as well. Was not able to be given last night. ? Levaquin 5/30-6/1 for UTI could be a/w AMS post admission  Tx Xanax 5/30 till 6/1 as well  Had been combative. Has c/o pain in R UE mostly in wrist - radial  Fracture documented  Persistent Expressive Aphasia - but improving  MRI Brain scheduled for AM  PT/OT/ST - re-consulted PT/OT since she will be better to participate  Appreciate ST eval/recommendations today      COVID-19 (5/30/2022)  Has been fully immunized  Received tx Paxlovid tid 5d PTA  Epocrates does not list delirium as a likely side effect  Suspect some global hypoxia during agitation / delirium  Suspect ?  Covid a/w encephalopathy  MRI appreciates chronic vascular disease w/o acute infarct or tumor      Recurrent UTI (6/5/2018)  F/u C&S obtained on 5/29 NSG  Tx Bactrim 5/30      LISBET (acute kidney injury) ([facility] [address] (5/30/2022)  F/u post IVF - 1/2 NS KCl improved BUN/Cr and K+  Switch to NS,  Mg run  Have tx  HypoK and HypoMg, however Non AG Met Acidosis still present  IV changed to NS with 20 meq/l KCl      HTN  Not able to give Toprol XL,  Change to Metoprolol 50mg bid and crush if needed  Cont Hydralazine IV prn      R Distal Radial Fracture  Splint, Tyl prn  Consult Dr. New Tai nonemergent        ______________________________________________________________________  SAFETY:   Code Status:DNR  DVT prophylaxis:Lovenox  Stress Ulcer prophylaxis: Pepcid  Bladder catheter:no (Dipika)  Family Contact Info:  Primary Emergency Contact: Isadore [place], [place] Phone: [phone]  Bedded: [facility] Room 124/01  Disposition: TBD, likely return to [facility]  Admission status:  Inpatient    Reviewed most current lab test results and cultures  YES  Reviewed most current radiology test results   YES  Review and summation of old records today    NO  Reviewed patient's current orders and MAR    YES  PMH/SH reviewed - no change compared to H&P    Care Plan discussed with:                                   Comments  Patient x     Family      RN x     Care Manager x      Consultant   x Speech                       Multidiciplinary team rounds were held today with , nursing, pharmacist and clinical coordinator. Patient's plan of care was discussed; medications were reviewed and discharge planning was addressed.         ____________________________________________    Total NON Critical Care TIME:  35   Minutes        Comments   >50% of visit spent in counseling and coordination of care   x      Signed: Cecille Wilburn MD  [facility] Hospitalist  [phone]

## 2022-06-06 NOTE — PROGRESS NOTES
Problem: Dysphagia (Adult)  Goal: *Acute Goals and Plan of Care (Insert Text)  Description: 6/3/2022  Speech path goals  1. Patient will tolerate reeval of swallowing. MET  2. Added 6/6/2022 Patient will tolerate puree/thin liquid diet with no overt s/s aspiration or adverse effects within 7 days  Outcome: Progressing Towards Goal     SPEECH LANGUAGE PATHOLOGY DYSPHAGIA TREATMENT  Patient: Faye Dukes (77 y.o. female)  Date: 6/6/2022  Diagnosis: AMS (altered mental status) [R41.82]  Altered mental status [R41.82]  UGSPK-07 [U07.1] AMS (altered mental status)       Precautions: aspiration      ASSESSMENT:  Patient with improved alertness this date, as patient alert upon SLP arrival and nodded when asked if she was Adebayo Winkler. Patient continues to be limited by AMS, as patient required intermittent verbal and tactile cues to suck from straw and manipulate puree bolus. Strong coughing noted with large gulps of thin liquid by straw, however patient tolerated small sips, especially self-fed cup sips with SLP support, with no overt s/s aspiration. Of note, patient with cough prior to PO intake as well, suspect related to COVID-19. Patient is at risk for aspiration secondary to AMS and COVID-19, as well as suspected stroke with MRI pending. If any s/s aspiration or change in vital signs noted with strict aspiration precautions as outlined below, recommend hold PO until SLP can re-evaluate as patient may need instrumental evaluation of swallowing. Given variable mental status, question if patient will be able to maintain nutrition/hydration with PO intake.       PLAN:  Recommendations and Planned Interventions:  -Puree/thin liquid diet only when patient awake, alert, and actively accepting with strict aspiration precautions, including fully upright for all PO intake, 1:1 feeding assistance and supervision, small/single bites and sips, preferably no straws but rather assist patient with taking small cup sips  -If any s/s aspiration or changes in vital signs noted despite use of aspiration precautions listed above, recommend hold PO until SLP can re-evaluate  -Continue meds crushed in puree  -SLP following for diet tolerance or further testing as indicated  Patient continues to benefit from skilled intervention to address the above impairments. Continue treatment per established plan of care. Discharge Recommendations: To Be Determined     SUBJECTIVE:   Patient stated Cold re: ice water. OBJECTIVE:   Cognitive and Communication Status:  Neurologic State: Alert,Confused  Orientation Level: Oriented to person  Cognition: Decreased command following,Decreased attention/concentration           Dysphagia Treatment:  Oral Assessment:  Oral Assessment  Labial: No impairment  Oral Hygiene: dry  P.O. Trials:  Patient Position: upright in bed  Vocal quality prior to P.O.: No impairment  Consistency Presented: Thin liquid;Puree  How Presented: SLP-fed/presented;Spoon;Straw;Self-fed/presented;Cup/sip     Bolus Acceptance: Impaired (cues to suck from straw)  Bolus Formation/Control: Impaired  Type of Impairment: Delayed;Premature spillage  Propulsion: Delayed (# of seconds)  Oral Residue: None  Initiation of Swallow: Delayed (# of seconds)     Aspiration Signs/Symptoms: Strong cough; Other (comment) (with large straw gulps)             Pain:  Pain Scale 1: FLACC  Pain Intensity 1: 3  Pain Location 1: Arm;Leg    After treatment:   Patient left in no apparent distress in bed, Nursing notified and Caregiver / family present    COMMUNICATION/EDUCATION:   Patient was too confused to benefit from education. The patient's plan of care including recommendations, planned interventions, and recommended diet changes were discussed with: Registered nurse.      Daniel Montoya SLP  Time Calculation: 20 mins

## 2022-06-06 NOTE — PROGRESS NOTES
Bedside and Verbal shift change report given to CARO Cobian RN (oncoming nurse) by Gulshan Rutledge RN   (offgoing nurse). Report included the following information SBAR, Kardex, Procedure Summary, Intake/Output, MAR, Accordion, Recent Results and Med Rec Status. Sutton score 4  Bed/chair alarm Yes in use. If in use it is set at the highest volume. Intravenous fluids were administered, 0.45 NaCl W/ 20 meq @ 75 ml/hr  Patient Vitals for the past 12 hrs:   Temp Pulse Resp BP SpO2   06/05/22 2026 98.6 °F (37 °C) (!) 110 18 (!) 149/88 92 %   06/05/22 1615 99.6 °F (37.6 °C) 76 20 118/82 92 %     No flowsheet data found. All lab results for the last 24 hours reviewed. Patient lethargic and unable to swallow safely. Worked with patient to assess and patient continued to drift off to sleep in seconds after looking directly at nurse. MD reminded that patient is NPO however MD wants patient to take crushed tablet Metoprolol. Patient having great difficulty expressing any words this shift or even following simple verbal commands. MD notified of tablet medications held.

## 2022-06-06 NOTE — PROGRESS NOTES
Updates(up to 6/6/22)  given to Gelacio Garcia, [facility]

## 2022-06-06 NOTE — PROGRESS NOTES
Follow up visit with patient in Rm 124 in MED/Surg  Provided empathic listening and spiritual support  Advised of  Availability   [address] [place] [address]

## 2022-06-07 LAB
ANION GAP SERPL CALC-SCNC: 14 MMOL/L (ref 5–15)
BUN SERPL-MCNC: 15 MG/DL (ref 6–20)
BUN/CREAT SERPL: 27 (ref 12–20)
CALCIUM SERPL-MCNC: 8.7 MG/DL (ref 8.5–10.1)
CHLORIDE SERPL-SCNC: 104 MMOL/L (ref 97–108)
CO2 SERPL-SCNC: 21 MMOL/L (ref 21–32)
CREAT SERPL-MCNC: 0.55 MG/DL (ref 0.55–1.02)
CRP SERPL-MCNC: 22.7 MG/DL (ref 0–0.6)
GLUCOSE SERPL-MCNC: 98 MG/DL (ref 65–100)
POTASSIUM SERPL-SCNC: 3.5 MMOL/L (ref 3.5–5.1)
SODIUM SERPL-SCNC: 139 MMOL/L (ref 136–145)
TSH SERPL DL<=0.05 MIU/L-ACNC: 1.23 UIU/ML (ref 0.36–3.74)
VIT B12 SERPL-MCNC: >2000 PG/ML (ref 193–986)

## 2022-06-07 PROCEDURE — 74011250637 HC RX REV CODE- 250/637: Performed by: INTERNAL MEDICINE

## 2022-06-07 PROCEDURE — 86140 C-REACTIVE PROTEIN: CPT

## 2022-06-07 PROCEDURE — 97530 THERAPEUTIC ACTIVITIES: CPT | Performed by: PHYSICAL THERAPIST

## 2022-06-07 PROCEDURE — 80048 BASIC METABOLIC PNL TOTAL CA: CPT

## 2022-06-07 PROCEDURE — 84443 ASSAY THYROID STIM HORMONE: CPT

## 2022-06-07 PROCEDURE — 97164 PT RE-EVAL EST PLAN CARE: CPT | Performed by: PHYSICAL THERAPIST

## 2022-06-07 PROCEDURE — 74011000250 HC RX REV CODE- 250: Performed by: INTERNAL MEDICINE

## 2022-06-07 PROCEDURE — 99223 1ST HOSP IP/OBS HIGH 75: CPT | Performed by: PSYCHIATRY & NEUROLOGY

## 2022-06-07 PROCEDURE — 82607 VITAMIN B-12: CPT

## 2022-06-07 PROCEDURE — 74011250636 HC RX REV CODE- 250/636: Performed by: INTERNAL MEDICINE

## 2022-06-07 PROCEDURE — 36415 COLL VENOUS BLD VENIPUNCTURE: CPT

## 2022-06-07 PROCEDURE — 97165 OT EVAL LOW COMPLEX 30 MIN: CPT

## 2022-06-07 PROCEDURE — 77030038269 HC DRN EXT URIN PURWCK BARD -A

## 2022-06-07 PROCEDURE — 65270000029 HC RM PRIVATE

## 2022-06-07 RX ORDER — GUAIFENESIN 100 MG/5ML
81 LIQUID (ML) ORAL DAILY
Status: DISCONTINUED | OUTPATIENT
Start: 2022-06-08 | End: 2022-06-09 | Stop reason: HOSPADM

## 2022-06-07 RX ADMIN — ENOXAPARIN SODIUM 40 MG: 100 INJECTION SUBCUTANEOUS at 10:23

## 2022-06-07 RX ADMIN — POTASSIUM CHLORIDE AND SODIUM CHLORIDE: 900; 150 INJECTION, SOLUTION INTRAVENOUS at 17:56

## 2022-06-07 RX ADMIN — METOPROLOL TARTRATE 25 MG: 25 TABLET, FILM COATED ORAL at 17:56

## 2022-06-07 RX ADMIN — METOPROLOL TARTRATE 25 MG: 25 TABLET, FILM COATED ORAL at 10:22

## 2022-06-07 RX ADMIN — SODIUM CHLORIDE, PRESERVATIVE FREE 5 ML: 5 INJECTION INTRAVENOUS at 05:51

## 2022-06-07 RX ADMIN — FAMOTIDINE 20 MG: 20 TABLET, FILM COATED ORAL at 17:56

## 2022-06-07 NOTE — PROGRESS NOTES
Problem: Self Care Deficits Care Plan (Adult)  Goal: *Acute Goals and Plan of Care (Insert Text)  Description: FUNCTIONAL STATUS PRIOR TO ADMISSION: Pt lived in assisted living facility and was talking, able to ambulate prior to illness. HOME SUPPORT: assisted living staff    Occupational Therapy Goals  Initiated 6/7/2022  1. Patient will perform self-feeding with moderate assistance  within 7 day(s). 2.  Patient will perform bathing with moderate assistance  within 7 day(s). 3.  Patient will perform upper body dressing with moderate assistance  within 7 day(s). 4.  Patient will perform toilet transfers with maximal assistance within 7 day(s). 5.  Patient will perform all aspects of toileting with maximal assistance within 7 day(s). 6.  Patient will participate in upper extremity therapeutic exercise/activities with supervision/set-up for 8 minutes within 7 day(s). 7.  Patient will utilize energy conservation techniques during functional activities with verbal cues within 7 day(s). Outcome: Not Progressing Towards Goal   OCCUPATIONAL THERAPY EVALUATION  Patient: Faye Dukes (25 y.o. female)  Date: 6/7/2022  Primary Diagnosis: AMS (altered mental status) [R41.82]  Altered mental status [R41.82]  PNQQL-59 [U07.1]        Precautions:   Contact    ASSESSMENT  Based on the objective data described below, the patient presents with altered mental status and COVID dx 11 days ago. She awakens to voice. Pt awakens to voice but is unable to squeeze therapist hand. She was able to follow OTR instruction and demonstration to nod yes and no but then did not use those gestures to answer questions including \"are you hungry, \"are you thirsty\". She opened mouth for a small bite of food but did not move it back. It was yogurt and dissolved. MD notified of status and inability to participate fully.     Current Level of Function Impacting Discharge (ADLs/self-care): total dependence    Functional Outcome Measure: The patient scored Total: 0/100 on the Barthel Index outcome measure which is indicative of being total dependence in basic self-care. Other factors to consider for discharge: lived in assisted living, was active     Patient will benefit from skilled therapy intervention to address the above noted impairments. PLAN :  Recommendations and Planned Interventions: self care training, functional mobility training, therapeutic exercise, therapeutic activities, endurance activities and patient education    Frequency/Duration: Patient will be followed by occupational therapy 3-5 times a week to address goals. Recommendation for discharge: (in order for the patient to meet his/her long term goals)  Therapy up to 5 days/week in SNF setting    This discharge recommendation:  Has been made in collaboration with the attending provider and/or case management    IF patient discharges home will need the following DME: TBD. At this time would not be able to return to an independent living type environment without full staff support.        SUBJECTIVE:   Patient stated pt non-verbal    OBJECTIVE DATA SUMMARY:   HISTORY:   Past Medical History:   Diagnosis Date    Arthritis     Cancer ([address]     skin ca removed from nose and leg    Chronic pain     left knee    GERD (gastroesophageal reflux disease)     Hypertension     Menopause     Other ill-defined conditions(799.89)     HIGH CHOLESTEROL    Recurrent UTI     TMJ (dislocation of temporomandibular joint)      Past Surgical History:   Procedure Laterality Date    HX GI      COLONOSCOPY    HX HEENT      BILATERAL CATARACTS,     HX HEENT Right     DETACHED RETINA     HX ORTHOPAEDIC Right 4/2013    TKR    HX TONSILLECTOMY  1940'S       Expanded or extensive additional review of patient history:     Home Situation  Home Environment: [address] Name: [facility]  One/Two Story Residence: One story  Living Alone: No  Support Systems: Child(lou),Long Term Care Facility,Assisted Living  Patient Expects to be Discharged to[de-identified] [facility]  Current DME Used/Available at Home: Other (comment)    Hand dominance: unable to assess    EXAMINATION OF PERFORMANCE DEFICITS:  Cognitive/Behavioral Status:  Neurologic State: Eyes open to voice; Lethargic  Orientation Level: Unable to verbalize  Cognition: Unable to assess (comment)             Skin: bruising noted R wrist    Edema: R hand edema    Hearing: Auditory  Auditory Impairment: None    Vision/Perceptual:      pt opens eyes, eye tracks but does not turn head    Range of Motion:  PROM in LUE is painful, pt grimaced with shoulder movement     Strength:  Unable to assess  Coordination:     Fine Motor Skills-Upper:  (unable to assess)    Gross Motor Skills-Upper: Left Impaired;Right Impaired    Tone & Sensation:  Unable to fully assess. LUE does not feel completely flaccid and no tone noted in either UE       Balance:  Sitting: Impaired  Standing:  (unable to assess)    Functional Mobility and Transfers for ADLs:  Bed Mobility:  Rolling: Total assistance  Supine to Sit: Total assistance  Scooting: Total assistance    Transfers:   cannot perform    ADL Assessment:  Feeding: Total assistance (modified diet)    Oral Facial Hygiene/Grooming: Total assistance    Bathing: Total assistance    Upper Body Dressing: Total assistance    Lower Body Dressing: Total assistance    Toileting: Total assistance                ADL Intervention and task modifications:  Unable to feed herself, total assist for all self care by staff      Functional Measure:    Barthel Index:  Bathin  Bladder: 0  Bowels: 0  Groomin  Dressin  Feedin  Mobility: 0  Stairs: 0  Toilet Use: 0  Transfer (Bed to Chair and Back): 0  Total: 0/100      The Barthel ADL Index: Guidelines  1. The index should be used as a record of what a patient does, not as a record of what a patient could do.   2. The main aim is to establish degree of independence from any help, physical or verbal, however minor and for whatever reason. 3. The need for supervision renders the patient not independent. 4. A patient's performance should be established using the best available evidence. Asking the patient, friends/relatives and nurses are the usual sources, but direct observation and common sense are also important. However direct testing is not needed. 5. Usually the patient's performance over the preceding 24-48 hours is important, but occasionally longer periods will be relevant. 6. Middle categories imply that the patient supplies over 50 per cent of the effort. 7. Use of aids to be independent is allowed. Score Interpretation (from [facility])    Independent   60-79 Minimally independent   40-59 Partially dependent   20-39 Very dependent   <20 Totally dependent     -Meera Roche., Barthel, D.W. (1965). Functional evaluation: the Barthel Index. [facility] ([address] (1997). The Barthel activities of daily living index: self-reporting versus actual performance in the old (> or = 75 years). Journal [address] 45(7), [facility], .VaneVane, Niru Gayleulysses., Dannial Bath. (1999). Measuring the change in disability after inpatient rehabilitation; comparison of the responsiveness of the Barthel Index and Functional [place] Measure. Journal of Neurology, Neurosurgery, and Psychiatry, 66(4), 957-971. Steven Quiroga NLIZBETH.LIZZIE, NEYDA Ayers, & Som Yoon, M.A. (2004) Assessment of post-stroke quality of life in cost-effectiveness studies: The usefulness of the Barthel Index and the EuroQoL-5D.  Quality of Life Research, 15, 111-94     Occupational Therapy Evaluation Charge Determination   History Examination Decision-Making   MEDIUM Complexity : Expanded review of history including physical, cognitive and psychosocial  history  HIGH Complexity : 5 or more performance deficits relating to physical, cognitive , or psychosocial skils that result in activity limitations and / or participation restrictions MEDIUM Complexity : Patient may present with comorbidities that affect occupational performnce. Miniml to moderate modification of tasks or assistance (eg, physical or verbal ) with assesment(s) is necessary to enable patient to complete evaluation       Based on the above components, the patient evaluation is determined to be of the following complexity level: MEDIUM  Pain Rating:  Nonverbal, grimaced with repositioning of the R wrist and moving the L shoulder passively    Activity Tolerance:   Poor    After treatment patient left in no apparent distress:    Supine in bed and Call bell within reach    COMMUNICATION/EDUCATION:   The patients plan of care was discussed with: Physical therapist, Physician and Case management. Patient is unable to participate in goal setting and plan of care.         Thank you for this referral.  Helen Hill OT  Time Calculation: 14 mins

## 2022-06-07 NOTE — PROGRESS NOTES
Problem: Falls - Risk of  Goal: *Absence of Falls  Description: Document Sidney Fierro Fall Risk and appropriate interventions in the flowsheet. Outcome: Progressing Towards Goal  Note: Fall Risk Interventions:  Mobility Interventions: Bed/chair exit alarm    Mentation Interventions: Adequate sleep, hydration, pain control,Bed/chair exit alarm,Door open when patient unattended    Medication Interventions: Bed/chair exit alarm,Teach patient to arise slowly    Elimination Interventions: Bed/chair exit alarm,Call light in reach,Toileting schedule/hourly rounds    History of Falls Interventions: Bed/chair exit alarm,Door open when patient unattended,Room close to nurse's station         Problem: Hypertension  Goal: *Blood pressure within specified parameters  Outcome: Progressing Towards Goal  Goal: *Fluid volume balance  Outcome: Progressing Towards Goal  Goal: *Labs within defined limits  Outcome: Progressing Towards Goal     Problem: Patient Education: Go to Patient Education Activity  Goal: Patient/Family Education  Outcome: Progressing Towards Goal     Problem: Airway Clearance - Ineffective  Goal: Achieve or maintain patent airway  Outcome: Progressing Towards Goal     Problem: Gas Exchange - Impaired  Goal: Absence of hypoxia  Outcome: Progressing Towards Goal  Goal: Promote optimal lung function  Outcome: Progressing Towards Goal     Problem: Breathing Pattern - Ineffective  Goal: Ability to achieve and maintain a regular respiratory rate  Outcome: Progressing Towards Goal     Problem:  Body Temperature -  Risk of, Imbalanced  Goal: Ability to maintain a body temperature within defined limits  Outcome: Progressing Towards Goal  Goal: Will regain or maintain usual level of consciousness  Outcome: Progressing Towards Goal  Goal: Complications related to the disease process, condition or treatment will be avoided or minimized  Outcome: Progressing Towards Goal     Problem: Isolation Precautions - Risk of Spread of Infection  Goal: Prevent transmission of infectious organism to others  Outcome: Progressing Towards Goal     Problem: Nutrition Deficits  Goal: Optimize nutrtional status  Outcome: Progressing Towards Goal     Problem: Risk for Fluid Volume Deficit  Goal: Maintain normal heart rhythm  Outcome: Progressing Towards Goal  Goal: Maintain absence of muscle cramping  Outcome: Progressing Towards Goal  Goal: Maintain normal serum potassium, sodium, calcium, phosphorus, and pH  Outcome: Progressing Towards Goal     Problem: Loneliness or Risk for Loneliness  Goal: Demonstrate positive use of time alone when socialization is not possible  Outcome: Progressing Towards Goal     Problem: Fatigue  Goal: Verbalize increase energy and improved vitality  Outcome: Progressing Towards Goal     Problem: Patient Education: Go to Patient Education Activity  Goal: Patient/Family Education  Outcome: Progressing Towards Goal     Problem: Urinary Tract Infection - Adult  Goal: *Absence of infection signs and symptoms  Outcome: Progressing Towards Goal     Problem: Patient Education: Go to Patient Education Activity  Goal: Patient/Family Education  Outcome: Progressing Towards Goal     Problem: Pressure Injury - Risk of  Goal: *Prevention of pressure injury  Description: Document Mark Scale and appropriate interventions in the flowsheet.   Outcome: Progressing Towards Goal  Note: Pressure Injury Interventions:  Sensory Interventions: Assess changes in LOC    Moisture Interventions: Absorbent underpads,Apply protective barrier, creams and emollients,Internal/External urinary devices    Activity Interventions: Increase time out of bed    Mobility Interventions: Float heels,HOB 30 degrees or less    Nutrition Interventions: Discuss nutritional consult with provider    Friction and Shear Interventions: HOB 30 degrees or less                Problem: Patient Education: Go to Patient Education Activity  Goal: Patient/Family Education  Outcome: Progressing Towards Goal     Problem: Aspiration - Risk of  Goal: *Absence of aspiration  Outcome: Progressing Towards Goal     Problem: Patient Education: Go to Patient Education Activity  Goal: Patient/Family Education  Outcome: Progressing Towards Goal

## 2022-06-07 NOTE — PROGRESS NOTES
Problem: Mobility Impaired (Adult and Pediatric)  Goal: *Acute Goals and Plan of Care (Insert Text)  Description: FUNCTIONAL STATUS PRIOR TO ADMISSION: Patient unable to answer. Per chart patient may have been primarily using wheelchair for mobility. HOME SUPPORT PRIOR TO ADMISSION: The patient lived at [facility]. Physical Therapy Goals  Initiated 6/7/2022  1. Patient will move from supine to sit and sit to supine  in bed with moderate assistance/contact guard assist within 7 day(s). 2.  Patient will transfer from bed to chair and chair to bed with maximal assistance using the least restrictive device within 7 day(s). 3.  Patient will perform sit to stand with maximal assistance within 7 day(s). Note:   PHYSICAL THERAPY REEVALUATION  Patient: Elio Alva (3 y.o. female)  Date: 6/7/2022  Primary Diagnosis: AMS (altered mental status) [R41.82]  Altered mental status [R41.82]  CXGRY-72 [U07.1]        Precautions:   Contact      ASSESSMENT  Based on the objective data described below, the patient presents with generalized weakness, decreased command following, decreased activity tolerance, and decreased functional mobility skills. Patient more alert but with minimal command following but does shake head \"no\" when asked if she were hungry. Patient does assist with with heel slides when asked. Patient requiring total assist to roll to right. Unable to come to sitting EOB. Patient also noted to have right distal radius fracture (unsure if acute per chart). Patient with recent Covid. Patient positioned for comfort at end of session. Patient's mobility has declined since initial evaluation on 5/31. Patient admitted from [facility] where she may have been using a wheelchair for mobility (need to clarify). Current Level of Function Impacting Discharge (mobility/balance): total assist    Functional Outcome Measure:   The patient scored 0/100 on the Barthel Index outcome measure which is indicative of total dependence with mobility and ADL's. Other factors to consider for discharge: needs total assist with mobility     Patient will benefit from skilled therapy intervention to address the above noted impairments. PLAN :  Recommendations and Planned Interventions: bed mobility training, transfer training, gait training, therapeutic exercises, and therapeutic activities      Frequency/Duration: Patient will be followed by physical therapy:  3 times a week to address goals. Recommendation for discharge: (in order for the patient to meet his/her long term goals)  Therapy up to 5 days/week in SNF setting    This discharge recommendation:  Has not yet been discussed the attending provider and/or case management    Equipment recommendations for successful discharge (if) home: TBD         SUBJECTIVE:   Patient only shakes head \"yes\" or \"no\" when questions asked. OBJECTIVE DATA SUMMARY:   HISTORY:    Past Medical History:   Diagnosis Date    Arthritis     Cancer ([facility] [address]     skin ca removed from nose and leg    Chronic pain     left knee    GERD (gastroesophageal reflux disease)     Hypertension     Menopause     Other ill-defined conditions(799.89)     HIGH CHOLESTEROL    Recurrent UTI     TMJ (dislocation of temporomandibular joint)      Past Surgical History:   Procedure Laterality Date    HX GI      COLONOSCOPY    HX HEENT      BILATERAL CATARACTS,     HX HEENT Right     DETACHED RETINA     HX ORTHOPAEDIC Right 4/2013    TKR    HX TONSILLECTOMY  1940'S     Hospital course since last seen and reason for reevaluation: Patient with decline in medical status; respiratory status seems stable.     Personal factors and/or comorbidities impacting plan of care: Covid, right wrist fracture    Home Situation  Home Environment: [address] Name: [facility] [place], operated by [facility]  One/Two Story Residence: One story  Living Alone: No  Support Systems: Child(lou),Long Term Care Facility,Assisted Living  Patient Expects to be Discharged to[de-identified] [facility]  Current DME Used/Available at Home: Other (comment)    EXAMINATION/PRESENTATION/DECISION MAKING:   Critical Behavior:  Neurologic State: Eyes open to voice,Lethargic  Orientation Level: Unable to verbalize  Cognition: Unable to assess (comment)     Hearing: Auditory  Auditory Impairment: None  Skin:  right wrist with some redness  Edema: right hand/wrist  Range Of Motion:  AROM: Grossly decreased, non-functional           PROM: Generally decreased, functional           Strength:    Strength: Grossly decreased, non-functional                    Tone & Sensation:   Tone: Normal              Sensation:  (not tested)               Coordination:     Vision:      Functional Mobility:  Bed Mobility:  Rolling: Total assistance  Supine to Sit: Total assistance     Scooting: Total assistance  Transfers:                             Balance:   Sitting:  (unable to test)  Standing:  (unable to test)  Ambulation/Gait Training:                                                         Functional Measure:  Barthel Index:    Bathin  Bladder: 0  Bowels: 0  Groomin  Dressin  Feedin  Mobility: 0  Stairs: 0  Toilet Use: 0  Transfer (Bed to Chair and Back): 0  Total: 0/100       The Barthel ADL Index: Guidelines  1. The index should be used as a record of what a patient does, not as a record of what a patient could do. 2. The main aim is to establish degree of independence from any help, physical or verbal, however minor and for whatever reason. 3. The need for supervision renders the patient not independent. 4. A patient's performance should be established using the best available evidence. Asking the patient, friends/relatives and nurses are the usual sources, but direct observation and common sense are also important. However direct testing is not needed.   5. Usually the patient's performance over the preceding 24-48 hours is important, but occasionally longer periods will be relevant. 6. Middle categories imply that the patient supplies over 50 per cent of the effort. 7. Use of aids to be independent is allowed. Score Interpretation (from [address])    Independent   60-79 Minimally independent   40-59 Partially dependent   20-39 Very dependent   <20 Totally dependent     -Meera Roche., Barthel, D.W. (1965). Functional evaluation: the Barthel Index. [address] ([address] (1997). The Barthel activities of daily living index: self-reporting versus actual performance in the old (> or = 75 years). Journal of [address] [place] [address] 45(7), [address] [facility], GUDELIA, Yoav Oglesby, Kevin Duvall. (1999). Measuring the change in disability after inpatient rehabilitation; comparison of the responsiveness of the Barthel Index and Functional Stella Measure. Journal of Neurology, Neurosurgery, and Psychiatry, 66(4), 694-985. Naomi Ochoa, N.J.A, NEYDA Ayers, & Donya Farnsworth, MVaneA. (2004) Assessment of post-stroke quality of life in cost-effectiveness studies: The usefulness of the Barthel Index and the EuroQoL-5D. Quality of Life Research, 13, 427-43              Pain Rating:  Grimacing when right UE moved    Activity Tolerance:   Poor    After treatment patient left in no apparent distress:   Supine in bed, Heels elevated for pressure relief, Call bell within reach, and Side rails x 3    COMMUNICATION/EDUCATION:   The patients plan of care was discussed with: Registered nurse. Patient is unable to participate in goal setting and plan of care.     Thank you for this referral.  Melanie Retana, PT   Time Calculation: 21 mins

## 2022-06-07 NOTE — CONSULTS
Ortho Consult Note      Complaints: Right wrist deformity. CVA and poor historian. Pt admitted for [facility] probable CVA. Swelling and deformity of right wrist has been noted and xray shows age indeterminant distal radius fracture. Ortho consulted for rec. Pt is poor communicator 2nd to CVA. Shakes her head \"No\" when asked if pain or tenderness at right wrist.  Visit Vitals  /80 (BP 1 Location: Left upper arm)   Pulse 62   Temp 99.2 °F (37.3 °C)   Resp 18   Ht 5' 5\" (1.651 m)   Wt 74.8 kg (165 lb)   SpO2 96%   Breastfeeding No   BMI 27.46 kg/m²     Physical Exam:   Visit Vitals  /80 (BP 1 Location: Left upper arm)   Pulse 62   Temp 99.2 °F (37.3 °C)   Resp 18   Ht 5' 5\" (1.651 m)   Wt 74.8 kg (165 lb)   SpO2 96%   Breastfeeding No   BMI 27.46 kg/m²     General appearance: Aphasic  Extremities: Deformity of distal radius w/out tenderness or pain w/ ROM  Pulses: intact  Skin: Dystrophic, no acute ecchymosis  Neurologic: difficult to eval.  Xray shows chronic deformity of distal radius. NO acute fractures are note. There is also chronic deformity of left prox humerus present on CXR from 10/2021  Recent Results (from the past 24 hour(s))   METABOLIC PANEL, BASIC    Collection Time: 06/07/22  5:39 AM   Result Value Ref Range    Sodium 139 136 - 145 mmol/L    Potassium 3.5 3.5 - 5.1 mmol/L    Chloride 104 97 - 108 mmol/L    CO2 21 21 - 32 mmol/L    Anion gap 14 5 - 15 mmol/L    Glucose 98 65 - 100 mg/dL    BUN 15 6 - 20 MG/DL    Creatinine 0.55 0.55 - 1.02 MG/DL    BUN/Creatinine ratio 27 (H) 12 - 20      GFR est AA >60 >60 ml/min/1.73m2    GFR est non-AA >60 >60 ml/min/1.73m2    Calcium 8.7 8.5 - 10.1 MG/DL       Assessment:  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 No acute fx noted    Plan:  Can discontinue the wrist splint unless pt prefers for comfort.

## 2022-06-07 NOTE — PROGRESS NOTES
Bedside and Verbal shift change report given to Renata Kirby RN (oncoming nurse) by Giselle Liz LPN (offgoing nurse). Report included the following information SBAR, Kardex, Intake/Output and MAR.

## 2022-06-07 NOTE — CONSULTS
Informed patient Neurology Note    Patient ID:  John Parish  [id number]  63 y.o.  7/1/1933      Date of Consultation:  June 7, 2022    Referring Physician: Dr. Román Falcon    Reason for Consultation:  Altered mental status      John Parish was evaluated through a synchronous (real-time) audio-video encounter. The patient (or guardian if applicable) is aware that this is a billable service, which includes applicable co-pays. This Virtual Visit was conducted with patient's (and/or legal guardian's) consent. The visit was conducted pursuant to the emergency declaration under the [address] [place] [address], [address] [place] [address] authority and the [employer] Act. Patient identification was verified, and a caregiver was present when appropriate. The patient was located at: [facility]  The provider was located at: [facility]      Assessment and Plan:    The patient is an 55-year-old female with multiple medical conditions who presents to the hospital with acute encephalopathy. She does have a history of a recent urinary tract infection as well as acute COVID infection. Her examination does reveal a suppressed mental status but no obvious focal weakness or sensory deficit. She was able to follow one-step commands for me consistently. I am unclear of her baseline    Acute encephalopathy:    Brain MRI revealed significant atrophy/volume loss and extensive chronic periventricular microvascular ischemic disease suggesting decreased cognitive reserve. Differential includes associated with COVID infection, metabolic derangements, medication induced,  hospitalization leading to a delirium, stepwise decline of a vascular dementia given the widespread extensive nature of the chronic microvascular changes. The atrophy and chronic microvascular ischemic disease does suggest some element of baseline cognitive impairment.   less likely seizures given her current examination. This very well may be multifactorial     TSH was normal.  I would recommend checking a vitamin B12 level and an ammonia level  Continue to replete electrolytes as needed  Continue physical therapy, Occupational Therapy, and speech therapy  Implement nonpharmacological behavioral measures to minimize risk of sundowning  Minimize cognitively impacting medications    Cerebrovascular disease:  She does have risk factors for stroke/TIA including hypertension, dyslipidemia  Brain MRI reveals a significant amount of chronic microvascular ischemic disease including Old chronic lacunar infarct  Patient should be on 81 mg asa daily. Htn: goal sbp less than 140  Dyslipidemia: noted in past medical history. Not on statin therapy. Would recommend checking lipid panel  Should obtain carotid doppler study        There is no other additional neurology recommendations at this time. If questions arise, please not hesitate to contact me and I will return to see the patient. Subjective: Minimal verbal output     History of Present Illness:   Daniel Rocha is a 80 y.o. female with a history of hypertension, reflux disease, recurrent UTIs who was recently diagnosed with COVID on May 26, 2022. She was on monoclonal antibody therapy. She was transferred from [facility] living due to altered mental status. She continued to be altered. Upon her initial presentation on May 30 she was awake, nonverbal and not cooperative. She had been given intermittent dose of Haldol and Xanax. Upon talking with nursing, she did have bouts of rage and anger initially but this has slowly declined over the past couple days. She was also initially on Levaquin for a urinary tract infection but has finished that course of treatment. There were electrolyte abnormalities upon admission including an elevated creatinine, hyponatremia, hypokalemia, which have all improved.   She did have pain and swelling in her right wrist and was noted to have a distal radial fracture. She was seen by orthopedics who determined that this was a chronic fracture and not acute. The patient did have a brain MRI completed yesterday which revealed a generalized volume loss with extensive chronic periventricular microvascular disease. There is also small lacunar infarcts noted. Nursing helped participate in the history taking and examination today. Past Medical History:   Diagnosis Date    Arthritis     Cancer ([address]     skin ca removed from nose and leg    Chronic pain     left knee    GERD (gastroesophageal reflux disease)     Hypertension     Menopause     Other ill-defined conditions(799.89)     HIGH CHOLESTEROL    Recurrent UTI     TMJ (dislocation of temporomandibular joint)     lumbar spine disease    Past Surgical History:   Procedure Laterality Date    HX GI      COLONOSCOPY    HX HEENT      BILATERAL CATARACTS,     HX HEENT Right     DETACHED RETINA     HX ORTHOPAEDIC Right 2013    TKR    HX TONSILLECTOMY  0'S        Family History   Problem Relation Age of Onset    Cancer Mother         BREAST    Stroke Mother         3 X    Breast Cancer Mother     Cancer Father         BRAIN    Heart Disease Brother     Heart Attack Brother     Breast Cancer Daughter     Anesth Problems Neg Hx         Social History     Tobacco Use    Smoking status: Former Smoker     Packs/day: 0.25     Years: 20.00     Pack years: 5.00     Quit date: 1987     Years since quittin.1    Smokeless tobacco: Never Used   Substance Use Topics    Alcohol use:  Yes     Alcohol/week: 0.0 standard drinks     Types: 7 - 14 Glasses of wine per week        Allergies   Allergen Reactions    Other Food Nausea and Vomiting     CANTELOUPE    Ciprofloxacin Rash    Duloxetine Diarrhea and Nausea and Vomiting    Augmentin [Amoxicillin-Pot Clavulanate] Other (comments)     DOES NOT KNOW REACTION      Cantaloupe Nausea and Vomiting    Gabapentin Other (comments)    Penicillins Other (comments)     DOES NOT KNOW REACTION    Codeine Other (comments)     Lower my blood pressure          Current Facility-Administered Medications   Medication Dose Route Frequency    0.9% sodium chloride with KCl 20 mEq/L infusion   IntraVENous CONTINUOUS    metoprolol tartrate (LOPRESSOR) tablet 25 mg  25 mg Oral BID    famotidine (PEPCID) tablet 20 mg  20 mg Oral QPM    hydrALAZINE (APRESOLINE) tablet 25 mg  25 mg Oral Q8H PRN    enoxaparin (LOVENOX) injection 40 mg  40 mg SubCUTAneous DAILY    sodium chloride (NS) flush 5-40 mL  5-40 mL IntraVENous Q8H    sodium chloride (NS) flush 5-40 mL  5-40 mL IntraVENous PRN    magnesium hydroxide (MILK OF MAGNESIA) 400 mg/5 mL oral suspension 30 mL  30 mL Oral DAILY PRN    acetaminophen (TYLENOL) tablet 650 mg  650 mg Oral Q6H PRN    ondansetron (ZOFRAN) injection 4 mg  4 mg IntraVENous Q4H PRN         Review of Systems:        [x]Unable to obtain  ROS due to  [x]mental status change  []sedated   []intubated    Objective:     Visit Vitals  /80 (BP 1 Location: Left upper arm)   Pulse 62   Temp 99.2 °F (37.3 °C)   Resp 18   Ht 5' 5\" (1.651 m)   Wt 165 lb (74.8 kg)   SpO2 96%   Breastfeeding No   BMI 27.46 kg/m²     There are limitations to the neurological examination due to the technological features of telemedicine  Physical Exam:    General:  appears well nourished in no acute distress  Skin: intact  Respiratory: no labored breathing      Neurological exam:    Patient is awake and alert. She did tell me her first name. She spoke quite softly and the nurse repeated for me everything that she said. She was able to state the days of the week. She could follow simple one-step commands including stick out her tongue, show me her thumb, show me 2 fingers, open and close her hand, wiggle her toes to command.     Cranial nerves:   Visual fields were full  Eomi, no evidence of nystagmus  Facial motor: normal and symmetric  Hearing appears diminished  Tongue: midline without fasciculations    Motor:   No evidence of fasciculations  She did lift both of her arms off the bed and would hold them in the air. She did purposeful movements with both of her upper extremities. There is no obvious focal weakness that was apparent. She did opening closure with her hands. She did lift each leg up off the bed for me independently    Sensory:  Denies sensory loss to light touch in her upper and lower extremities    Reflexes:  Unable to obtain via telemedicine    Cerebellar testing:  no tremor apparent, finger/nosewere intact    gait: This was not assessed due to her mental status        Labs:     Lab Results   Component Value Date/Time    Hemoglobin A1c 5.0 07/22/2014 12:03 PM    Sodium 139 06/07/2022 05:39 AM    Potassium 3.5 06/07/2022 05:39 AM    Chloride 104 06/07/2022 05:39 AM    Glucose 98 06/07/2022 05:39 AM    BUN 15 06/07/2022 05:39 AM    Creatinine 0.55 06/07/2022 05:39 AM    Calcium 8.7 06/07/2022 05:39 AM    WBC 11.6 (H) 06/06/2022 05:51 AM    HCT 29.8 (L) 06/06/2022 05:51 AM    HGB 10.2 (L) 06/06/2022 05:51 AM    PLATELET 090 20/49/6406 05:51 AM       Imaging:    Results from Hospital Encounter encounter on 05/29/22    MRI BRAIN WO CONT    Narrative  INDICATION:   AMS, aphasic, hx trauma    EXAMINATION:  MRI BRAIN WO CONTRAST    COMPARISON:  CT May 29, 2022    TECHNIQUE:  Multiplanar multisequence acquisition without contrast of the brain. FINDINGS:    Ventricles:  Midline, no hydrocephalus. Brain Parenchyma/Brainstem:  Mild generalized parenchymal volume loss. Extensive  confluent periventricular white matter signal abnormality. Small chronic lacunar  infarction posterior right thalamus. Mild patchy T2 hyperintensity in the lisandro. No acute infarction. Intracranial Hemorrhage:  None. Basal Cisterns:  Normal.  Flow Voids:  Normal.  Additional Comments:  N/A.     Impression  Extensive chronic white matter signal abnormality with no acute infarction. Results from [place] encounter on 05/29/22    CT HEAD WO CONT    Narrative  EXAM:  CT HEAD WO CONT    INDICATION: Altered mental status    COMPARISON: CT 3/31/2022    TECHNIQUE: Axial noncontrast head CT from foramen magnum to vertex. Coronal and  sagittal reformatted images were obtained. CT dose reduction was achieved  through use of a standardized protocol tailored for this examination and  automatic exposure control for dose modulation. FINDINGS: Motion limits evaluation. There is diffuse age-related parenchymal  volume loss. The ventricles and sulci are age-appropriate without hydrocephalus. There is no mass effect or midline shift. There is no intracranial hemorrhage or  extra-axial fluid collection. Areas of low attenuation in the periventricular  white matter represent stable chronic microvascular ischemic changes. The  gray-white matter differentiation is maintained. The basal cisterns are patent. The osseous structures are intact. The visualized paranasal sinuses and mastoid  air cells are clear. Impression  No acute intracranial abnormality. I spent  70   minutes providing care to this  acutely ill inpatient with > 50% of the time counseling and assisting in the coordination of care of the patient on the patient's hospital floor/unit. Complex decision making was necessary due to the patient's current medical condition and other medical co-morbidities.            Principal Problem:    AMS (altered mental status) (5/29/2022)    Active Problems:    Hypertension ()      Recurrent UTI (6/5/2018)      COVID-19 (5/30/2022)      LISBET (acute kidney injury) ([facility] [address] (5/30/2022)      Delirium (6/3/2022)      Fracture of distal end of right radius (6/4/2022)                   Signed By:  Jerlyn Kawasaki, DO FAAN    June 7, 2022

## 2022-06-07 NOTE — PROGRESS NOTES
Pt has had a poor appetite this shift. I have fed her small amts each meal. She did drink most of her Ensure this morning with much encouragement from nursing. Tele/Neuro consult done at 1630.

## 2022-06-07 NOTE — PROGRESS NOTES
Problem: Falls - Risk of  Goal: *Absence of Falls  Description: Document Vikash Begum Fall Risk and appropriate interventions in the flowsheet.   Outcome: Progressing Towards Goal  Note: Fall Risk Interventions:  Mobility Interventions: PT Consult for mobility concerns,Utilize walker, cane, or other assistive device    Mentation Interventions: Adequate sleep, hydration, pain control,Bed/chair exit alarm,Door open when patient unattended    Medication Interventions: Bed/chair exit alarm    Elimination Interventions: Call light in reach,Bed/chair exit alarm    History of Falls Interventions: Door open when patient unattended,Bed/chair exit alarm         Problem: Hypertension  Goal: *Blood pressure within specified parameters  Outcome: Progressing Towards Goal  Goal: *Fluid volume balance  Outcome: Progressing Towards Goal  Goal: *Labs within defined limits  Outcome: Progressing Towards Goal  Note: Potassium levels normal today     Problem: Airway Clearance - Ineffective  Goal: Achieve or maintain patent airway  Outcome: Progressing Towards Goal     Problem: Breathing Pattern - Ineffective  Goal: Ability to achieve and maintain a regular respiratory rate  Outcome: Progressing Towards Goal     Problem: Isolation Precautions - Risk of Spread of Infection  Goal: Prevent transmission of infectious organism to others  Outcome: Progressing Towards Goal     Problem: Risk for Fluid Volume Deficit  Goal: Maintain normal heart rhythm  Outcome: Progressing Towards Goal     Problem: Loneliness or Risk for Loneliness  Goal: Demonstrate positive use of time alone when socialization is not possible  Outcome: Progressing Towards Goal

## 2022-06-08 ENCOUNTER — APPOINTMENT (OUTPATIENT)
Dept: GENERAL RADIOLOGY | Age: 87
DRG: 178 | End: 2022-06-08
Attending: INTERNAL MEDICINE
Payer: MEDICARE

## 2022-06-08 ENCOUNTER — APPOINTMENT (OUTPATIENT)
Dept: ULTRASOUND IMAGING | Age: 87
DRG: 178 | End: 2022-06-08
Attending: INTERNAL MEDICINE
Payer: MEDICARE

## 2022-06-08 LAB
AMMONIA PLAS-SCNC: 24 UMOL/L
CHOLEST SERPL-MCNC: 155 MG/DL
FOLATE SERPL-MCNC: 4.9 NG/ML
HDLC SERPL-MCNC: 34 MG/DL
HDLC SERPL: 4.6 {RATIO} (ref 0–5)
LDLC SERPL CALC-MCNC: 108.2 MG/DL (ref 0–100)
LEFT CCA DIST DIAS: 7.9 CM/S
LEFT CCA DIST SYS: 53.1 CM/S
LEFT CCA PROX DIAS: 4.5 CM/S
LEFT CCA PROX SYS: 59.8 CM/S
LEFT ECA DIAS: 11.12 CM/S
LEFT ECA SYS: 59 CM/S
LEFT ICA DIST DIAS: 16.1 CM/S
LEFT ICA DIST SYS: 55.9 CM/S
LEFT ICA MID DIAS: 13.1 CM/S
LEFT ICA MID SYS: 60.3 CM/S
LEFT ICA PROX DIAS: 2.7 CM/S
LEFT ICA PROX SYS: 38.3 CM/S
LEFT ICA/CCA SYS: 1.13 NO UNITS
LEFT VERTEBRAL DIAS: 7.92 CM/S
LEFT VERTEBRAL SYS: 48.3 CM/S
RIGHT CCA DIST DIAS: 6.5 CM/S
RIGHT CCA DIST SYS: 49.4 CM/S
RIGHT CCA PROX DIAS: 5.4 CM/S
RIGHT CCA PROX SYS: 50.7 CM/S
RIGHT ECA DIAS: 6.49 CM/S
RIGHT ECA SYS: 52.7 CM/S
RIGHT ICA DIST DIAS: 12.7 CM/S
RIGHT ICA DIST SYS: 52.1 CM/S
RIGHT ICA MID DIAS: 4.9 CM/S
RIGHT ICA MID SYS: 30.1 CM/S
RIGHT ICA PROX DIAS: 7.2 CM/S
RIGHT ICA PROX SYS: 34.1 CM/S
RIGHT ICA/CCA SYS: 1.1 NO UNITS
RIGHT VERTEBRAL DIAS: 4.29 CM/S
RIGHT VERTEBRAL SYS: 26.3 CM/S
TRIGL SERPL-MCNC: 64 MG/DL (ref ?–150)
VLDLC SERPL CALC-MCNC: 12.8 MG/DL

## 2022-06-08 PROCEDURE — 93880 EXTRACRANIAL BILAT STUDY: CPT

## 2022-06-08 PROCEDURE — 80061 LIPID PANEL: CPT

## 2022-06-08 PROCEDURE — 74011250637 HC RX REV CODE- 250/637: Performed by: INTERNAL MEDICINE

## 2022-06-08 PROCEDURE — 65270000029 HC RM PRIVATE

## 2022-06-08 PROCEDURE — 74011250636 HC RX REV CODE- 250/636: Performed by: INTERNAL MEDICINE

## 2022-06-08 PROCEDURE — 82140 ASSAY OF AMMONIA: CPT

## 2022-06-08 PROCEDURE — 71045 X-RAY EXAM CHEST 1 VIEW: CPT

## 2022-06-08 PROCEDURE — 97530 THERAPEUTIC ACTIVITIES: CPT

## 2022-06-08 PROCEDURE — 36415 COLL VENOUS BLD VENIPUNCTURE: CPT

## 2022-06-08 PROCEDURE — 92526 ORAL FUNCTION THERAPY: CPT

## 2022-06-08 PROCEDURE — 74011000250 HC RX REV CODE- 250: Performed by: INTERNAL MEDICINE

## 2022-06-08 PROCEDURE — 77030038269 HC DRN EXT URIN PURWCK BARD -A

## 2022-06-08 RX ORDER — ATORVASTATIN CALCIUM 10 MG/1
10 TABLET, FILM COATED ORAL DAILY
Status: DISCONTINUED | OUTPATIENT
Start: 2022-06-08 | End: 2022-06-09 | Stop reason: HOSPADM

## 2022-06-08 RX ADMIN — ASPIRIN 81 MG 81 MG: 81 TABLET ORAL at 11:30

## 2022-06-08 RX ADMIN — HYDRALAZINE HYDROCHLORIDE 25 MG: 25 TABLET, FILM COATED ORAL at 17:18

## 2022-06-08 RX ADMIN — METOPROLOL TARTRATE 25 MG: 25 TABLET, FILM COATED ORAL at 11:30

## 2022-06-08 RX ADMIN — ATORVASTATIN CALCIUM 10 MG: 10 TABLET, FILM COATED ORAL at 11:30

## 2022-06-08 RX ADMIN — METOPROLOL TARTRATE 25 MG: 25 TABLET, FILM COATED ORAL at 17:17

## 2022-06-08 RX ADMIN — FAMOTIDINE 20 MG: 20 TABLET, FILM COATED ORAL at 17:17

## 2022-06-08 RX ADMIN — SODIUM CHLORIDE, PRESERVATIVE FREE 10 ML: 5 INJECTION INTRAVENOUS at 11:41

## 2022-06-08 RX ADMIN — POTASSIUM CHLORIDE AND SODIUM CHLORIDE: 900; 150 INJECTION, SOLUTION INTRAVENOUS at 14:14

## 2022-06-08 RX ADMIN — ENOXAPARIN SODIUM 40 MG: 100 INJECTION SUBCUTANEOUS at 09:22

## 2022-06-08 NOTE — PROGRESS NOTES
[facility]  Hospitalist Progress Note    NAME: Feliberto Worthington   :  1933   MRN:  [id number]     Total duration of encounter: 10 days      Interim Hospital Summary: 80 y.o. female who presented on 2022 with AMS (altered mental status). She has a past medical history of Arthritis, Cancer ([address] Chronic pain, GERD (gastroesophageal reflux disease), Hypertension, Menopause, Other ill-defined conditions(799.89), Recurrent UTI, and TMJ (dislocation of temporomandibular joint). She has no past medical history of Endocrine disorder. .      Pt admitted  with AMS. Dx COVID at [facility], [place], operated by [facility]  tx Monoclonal Ab.and Paxlovid, before referred to ED with agitated state. AMS / Nonverbal and not cooperative initially tx Haldol, Xanax. No h/o cough, fever, dyspnea. Mostly c/o fatigue / myalgia. No GI upset. Had received tx for UTI about a week ago. Tx Haldol / Xanax till . Tx Levaquin  till . Having pain / swelling R wrist - xray noted for distal radial fx - Dr. Asiya Kearney feels this is old (family noted trauma falling about 3 months ago). Has been more alert and compliant, but remains altered with expressive aphasia. No focal weakness observed. Pockets food with oral problems per ST. This will make for nutritional problems,  Will be returning to [address] [facility], [place], operated by [facility] tomorrow Thursday          Subjective:     Chief Complaint / Reason for Physician Visit  \"-\". Discussed with RN   Has more sedation in mornings? No pain or anxiety tx has been given.     Med list is Pepcid / Metoprolol  Has added ASA / Lipitor today    Intermittently alert, better verbal at times  Taking oral meds  Pockets food - needs extra help at mealtime  May not get enough post IVF  G-tube would need to be considered at that point    Xrays noted for distal radial fracture - felt to be old per Dr. Asiya Kearney  Daughter notes a fall several months ago a/w head trauma then as well    Unclear EtOH history PTA  Need to r/o withdrawal. EtOH level was not obtained  Seems to nod / facial expression that she does have cocktails at [facility]      Review of Systems:  UTO  Symptom Y/N Comments  Symptom Y/N Comments   Fever/Chills    Chest Pain     Poor Appetite    Edema     Cough    Abdominal Pain     Sputum    Joint Pain     SOB/DALY    Pruritis/Rash     Nausea/vomit    Tolerating PT/OT     Diarrhea    Tolerating Diet     Constipation    Other         Current Facility-Administered Medications:     atorvastatin (LIPITOR) tablet 10 mg, 10 mg, Oral, DAILY, Cheo Browne MD, 10 mg at 06/08/22 1130    aspirin chewable tablet 81 mg, 81 mg, Oral, DAILY, Cheo Browne MD, 81 mg at 06/08/22 1130    0.9% sodium chloride with KCl 20 mEq/L infusion, , IntraVENous, CONTINUOUS, Cheo Browne MD, Last Rate: 50 mL/hr at 06/08/22 1414, New Bag at 06/08/22 1414    metoprolol tartrate (LOPRESSOR) tablet 25 mg, 25 mg, Oral, BID, Cheo Browne MD, 25 mg at 06/08/22 1130    famotidine (PEPCID) tablet 20 mg, 20 mg, Oral, QPM, Cheo Browne MD, 20 mg at 06/07/22 1756    hydrALAZINE (APRESOLINE) tablet 25 mg, 25 mg, Oral, Q8H PRN, Mark Al MD    enoxaparin (LOVENOX) injection 40 mg, 40 mg, SubCUTAneous, DAILY, Mark Al MD, 40 mg at 06/08/22 4710    sodium chloride (NS) flush 5-40 mL, 5-40 mL, IntraVENous, Q8H, Melly Harris MD, 10 mL at 06/08/22 1141    sodium chloride (NS) flush 5-40 mL, 5-40 mL, IntraVENous, PRN, Melly Harris MD    magnesium hydroxide (MILK OF MAGNESIA) 400 mg/5 mL oral suspension 30 mL, 30 mL, Oral, DAILY PRN, Melly Harris MD    acetaminophen (TYLENOL) tablet 650 mg, 650 mg, Oral, Q6H PRN, Matilde Orozco MD, 650 mg at 06/01/22 2102    ondansetron (ZOFRAN) injection 4 mg, 4 mg, IntraVENous, Q4H PRN, Danie Palomares MD    Objective:     VITALS:   Patient Vitals for the past 12 hrs:   Temp Pulse Resp BP SpO2   06/08/22 1200 -- 80 -- (!) 152/70 --   06/08/22 1143 99.7 °F (37.6 °C) -- -- -- --   06/08/22 8376 (!) 100.8 °F (38.2 °C) 86 20 (!) 178/98 96 %       Intake/Output Summary (Last 24 hours) at 6/8/2022 1544  Last data filed at 6/8/2022 1400  Gross per 24 hour   Intake 0 ml   Output 1950 ml   Net -1950 ml        PHYSICAL EXAM:  General: WD, WN. Sedate but arouses, cooperative, no acute distress. EENT:  EOMI. Anicteric sclerae. MMM  Resp:  CTA bilaterally, no wheezing or rales. No accessory muscle use  CV:  Regular  rhythm,  No edema  GI:  Soft, Non distended, Non tender. +Bowel sounds  Neurologic:  Alert, nods / facial expressions to questions,  Expressive aphasia    Moving all ext appropriately. Swallowing ice / apple sauce with effort  Psych:   Not anxious nor agitated  Skin:  No rashes. No jaundice  Ext:  Contusion / Swelling R dorsal hand / wrist, tender to movement    LABS:  I reviewed today's most current labs and imaging studies. Pertinent labs include:  Recent Labs     06/06/22  0551   WBC 11.6*   HGB 10.2*   HCT 29.8*        Recent Labs     06/07/22  0539 06/06/22  0551    135*   K 3.5 3.7    102   CO2 21 19*   GLU 98 98   BUN 15 16   CREA 0.55 0.60   CA 8.7 8.4*   MG  --  2.0       RADIOLOGY:  CT HEAD 5/29:  Motion limits evaluation. There is diffuse age-related parenchymal  volume loss. The ventricles and sulci are age-appropriate without hydrocephalus. There is no mass effect or midline shift. There is no intracranial hemorrhage or  extra-axial fluid collection. Areas of low attenuation in the periventricular  white matter represent stable chronic microvascular ischemic changes. The  gray-white matter differentiation is maintained. The basal cisterns are patent.   The osseous structures are intact. The visualized paranasal sinuses and mastoid air cells are clear.   IMPRESSION: No acute intracranial abnormality. pCXR 5/29:  The cardiomediastinal contours are within normal limits. The lungs and  pleural spaces are clear. There is no pneumothorax.  There are degenerative  changes in the spine and shoulders. The upper abdomen is unremarkable.   IMPRESSION: No acute process. pCXR 6/2:  The cardiomediastinal contours are stable. The lungs and pleural  spaces are clear. There is no pneumothorax. The bones and upper abdomen are stable.   IMPRESSION: No acute process. R WRIST 6/4:  Two views of the right wrist demonstrate age-indeterminate distal  radius fracture deformity. Positive ulnar variance. Moderate to marked  osteoarthritis, most pronounced at the base of the thumb and interphalangeal  joints. Diffuse soft tissue swelling   IMPRESSION: Age indeterminate distal radius fracture deformity. MRI BRAIN 6/6:  Brain Parenchyma/Brainstem:  Mild generalized parenchymal volume loss. Extensive  confluent periventricular white matter signal abnormality. Small chronic lacunar  infarction posterior right thalamus. Mild patchy T2 hyperintensity in the lisandro. No acute infarction. IMPRESSION:  Extensive chronic white matter signal abnormality with no acute infarction. CAROTID DUPLEX 6/8:  Mild (<50%) stenosis of both ICAs  Mild and heterogeneous plaque in the right ICA  Mild plaque in the left ICA    EKG 5/29:  NSR 64 bpm, PACs, LVH Voltage, NSTWC inferior    Procedures: see electronic medical records for all procedures/Xrays and details which were not copied into this note but were reviewed prior to creation of Plan. CONSULTS  ORTHO / MCCOIG  Assessment:  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 No acute fx noted   Plan:  Can discontinue the wrist splint unless pt prefers for comfort.     NEURO / Edra Mettle  Acute encephalopathy   Brain MRI revealed significant atrophy/volume loss and extensive chronic periventricular microvascular ischemic disease suggesting decreased cognitive reserve.   Differential includes associated with COVID infection, metabolic derangements, medication induced,  hospitalization leading to a delirium, stepwise decline of a vascular dementia given the widespread extensive nature of the chronic microvascular changes. The atrophy and chronic microvascular ischemic disease does suggest some element of baseline cognitive impairment. less likely seizures given her current examination. This very well may be multifactorial    TSH was normal.  I would recommend checking a vitamin B12 level and an ammonia level  Continue to replete electrolytes as needed  Continue physical therapy, Occupational Therapy, and speech therapy  Implement nonpharmacological behavioral measures to minimize risk of sundowning  Minimize cognitively impacting medications   Cerebrovascular disease:  She does have risk factors for stroke/TIA including hypertension, dyslipidemia  Brain MRI reveals a significant amount of chronic microvascular ischemic disease including Old chronic lacunar infarct  Patient should be on 81 mg asa daily. Htn: goal sbp less than 140  Dyslipidemia: noted in past medical history. Not on statin therapy. Would recommend checking lipid panel  Should obtain carotid doppler study          Assessment / Plan:    Principal Problem:    AMS (altered mental status) (5/29/2022)  Active Problems:    Delirium (6/3/2022)  Received tx Haldol 2mg 5/30 till 6/1 for agitation  Trazodone 50mg qhs, will be stopped as well. Was not able to be given last night. ? Levaquin 5/30-6/1 for UTI could be a/w AMS post admission  Tx Xanax 5/30 till 6/1 as well  Had been combative.   Has c/o pain in R UE mostly in wrist - radial  Fracture documented  Persistent Expressive Aphasia - but improving  MRI Brain 6/7 noted for chronic vascular disease  PT/OT/ST - re-consulted PT/OT since she will be better to participate  Appreciate ST eval/recommendations -  Pureed diet / Couching  Appreciate recommendation Dr. Lee Ann Steinberg - Carotid Duplex, ASA 81mg, Lipid panel, Ammonia level  CALLED daughter today and discussed finding and plans at length  Low grade fever this AM ? A/w mild aspiration (was not reported)      COVID-19 (5/30/2022)  Has been fully immunized  Received tx Paxlovid tid 5d PTA and ? Monoclonal Ab as well  Epocrates does not list delirium as a likely side effect  Suspect underlying dementia with Covid related encephalopathy  MRI appreciates chronic vascular disease w/o acute infarct or tumor   Has added ASA / Lipitor      Recurrent UTI (6/5/2018)  F/u C&S obtained on 5/29 NSG  Tx Bactrim 5/30      LISBET (acute kidney injury) ([facility] [address] (5/30/2022)  F/u post IVF - 1/2 NS KCl switched to NS  Give occ  Mg run  Have tx  HypoK and HypoMg and Non AG Met Acidosis  F/u in AM      HTN  Cont tx Metoprolol 25 mg bid and crush if needed  Cont Hydralazine IV prn      R Distal Radial Fracture  Splint stopped  No acute fx per Dr. New Tai        ______________________________________________________________________  SAFETY:   Code Status:DNR  DVT prophylaxis:Lovenox  Stress Ulcer prophylaxis: Pepcid  Bladder catheter:no (Lucía Hendricks)  Family Contact Info:  Primary Emergency Contact: [address], Home Phone: [phone]  Bedded: [facility] Room 124/01  Disposition: TBD, likely return to [address] [place] [address]  Admission status:  Inpatient    Reviewed most current lab test results and cultures  YES  Reviewed most current radiology test results   YES  Review and summation of old records today    NO  Reviewed patient's current orders and MAR    YES  PMH/SH reviewed - no change compared to H&P    Care Plan discussed with:                                   Comments  Patient x     Family  x Daughter on phone this AM   RN x     Care Manager x      Consultant   x Reports from Dr. Jernigan Said team rounds were held today with , nursing, pharmacist and clinical coordinator. Patient's plan of care was discussed; medications were reviewed and discharge planning was addressed.         ____________________________________________    Total NON Critical Care TIME:  35 Minutes        Comments   >50% of visit spent in counseling and coordination of care   x      Signed: Neema Colon MD  [facility] Hospitalist  [phone]

## 2022-06-08 NOTE — PROGRESS NOTES
Problem: Mobility Impaired (Adult and Pediatric)  Goal: *Acute Goals and Plan of Care (Insert Text)  Description: FUNCTIONAL STATUS PRIOR TO ADMISSION: Patient unable to answer. Per chart patient may have been primarily using wheelchair for mobility. HOME SUPPORT PRIOR TO ADMISSION: The patient lived at [facility]. Physical Therapy Goals  Initiated 6/7/2022  1. Patient will move from supine to sit and sit to supine  in bed with moderate assistance/contact guard assist within 7 day(s). 2.  Patient will transfer from bed to chair and chair to bed with maximal assistance using the least restrictive device within 7 day(s). 3.  Patient will perform sit to stand with maximal assistance within 7 day(s). Outcome: Not Met   PHYSICAL THERAPY TREATMENT  Patient: Sandy Gunter (23 y.o. female)  Date: 6/8/2022  Diagnosis: AMS (altered mental status) [R41.82]  Altered mental status [R41.82]  MDIHR-78 [U07.1] AMS (altered mental status)       Precautions: Fall,Skin  Chart, physical therapy assessment, plan of care and goals were reviewed. ASSESSMENT  Patient continues with skilled PT services and is progressing towards goals very slowly. She tolerates bed in chair position followed by mobility to edge of bed where she sits x 15 mins. She requires total assist for all mobility except offering 5-10% assistance for sit to supine activity. She initially sits with poor balance and posterior lean but improves to fair balance with CGA for last 5 mins of trial. Pt offers few verbalizations and more nonverbal expressions in response to questions and conversation. She grimaces and nods to affirm pain but is unable to identify location or nature. Adult nonverbal pain scale score 0 at rest. Pt alert for all sitting activities but quickly falls asleep once in supine.      Current Level of Function Impacting Discharge (mobility/balance): total A bed mobility    Other factors to consider for discharge: none additional         PLAN :  Patient continues to benefit from skilled intervention to address the above impairments. Continue treatment per established plan of care. to address goals. Recommendation for discharge: (in order for the patient to meet his/her long term goals)  Therapy up to 5 days/week in SNF setting vs. LTC    This discharge recommendation:  Has not yet been discussed the attending provider and/or case management    IF patient discharges home will need the following DME: to be determined (TBD)       SUBJECTIVE:   Patient stated Beats me, when asked regarding why she is hospitalized. Pt received supine, agreeable to PT and cleared by RN. OBJECTIVE DATA SUMMARY:   Critical Behavior:  Neurologic State: Lethargic  Orientation Level: affirms her own last name with head nod   Cognition: Decreased attention/concentration,Decreased command following,Memory loss; decreased verbalizations  Safety/Judgement: Decreased awareness of environment,Decreased awareness of need for assistance,Decreased insight into deficits    At rest pre-treatment pt reluctant to turn head to R though able to offer visual tracking and head turns with encouragement and repeated trials. With bed turned for r-sided stimulus of window pt with improved attention to R. Functional Mobility Training:  Bed Mobility:  Rolling: Total assistance  Supine to Sit: Total assistance  Sit to Supine: Total assistance (offers 5-10% assist)  Scooting: Total assistance        Transfers:            Infer total A; may require lateral bolstering                       Balance:  Sitting: Impaired (flexed trunk)  Sitting - Static: Fair (occasional) (poor initially, improves with time)  Sitting - Dynamic: Fair (occasional)      Occasional posterior lean; engages in dynamic UE reaching activities with ~25% verbal command following.       Ambulation/Gait Training:                  Pt unable                                        Pain Rating:  Adult nonverbal 0 at rest  Grimaces with activity; shakes head to deny pain throughout body when asked with tactile identification of location. Activity Tolerance:   Fair    After treatment patient left in no apparent distress:   Supine in bed, Call bell within reach, Bed / chair alarm activated, Side rails x 3, and heels floating, lateral prop on L    COMMUNICATION/COLLABORATION:   The patients plan of care was discussed with: Registered nurse.      Jarvis Boyd PT, DPT   Time Calculation: 31 mins

## 2022-06-08 NOTE — PROGRESS NOTES
Follow up visit with patient in Rm 124  Patient was more alert  than normal  But still confused  Provided spiritual support and prayer  Advised of  Availability   [address] [place] [address]

## 2022-06-08 NOTE — PROGRESS NOTES
Bedside shift change report given to Akash Lancaster (oncoming nurse) by Marleny Arriaga (offgoing nurse). Report included the following information SBAR, Kardex, Intake/Output, MAR, Recent Results and Med Rec Status.  \

## 2022-06-08 NOTE — PROGRESS NOTES
Bedside shift change report given to Filomena Mercado 66 (oncoming nurse) by Girish Jeong RN (offgoing nurse). Report included the following information Kardex, Intake/Output and Recent Results.

## 2022-06-08 NOTE — PROGRESS NOTES
[facility]  Hospitalist Progress Note    NAME: Odette Montano   :  1933   MRN:  [id number]     Total duration of encounter: 9 days      Interim Hospital Summary: 80 y.o. female who presented on 2022 with AMS (altered mental status). She has a past medical history of Arthritis, Cancer ([address] Chronic pain, GERD (gastroesophageal reflux disease), Hypertension, Menopause, Other ill-defined conditions(799.89), Recurrent UTI, and TMJ (dislocation of temporomandibular joint). She has no past medical history of Endocrine disorder. .      Pt admitted  with AMS. Dx COVID at [facility]  tx Monoclonal Ab.  AMS / Nonverbal and not cooperative. No h/o cough, fever, dyspnea. Mostly c/o fatigue / myalgia. No GI upset. Had received tx for UTI about a week ago. Tx Haldol / Xanax till . Tx Levaquin  till . Having pain / swelling R wrist - xray noted for distal radial fx. Has been more alert and compliant, but remains altered with expressive aphasia. No focal weakness observed. Subjective:     Chief Complaint / Reason for Physician Visit  \"better\".   Discussed with RN     Remains alert, better verbal communication today  Did better to ID pen and wall clock  Taking oral meds    Xrays noted for distal radial fractures     Unclear EtOH history PTA  Need to r/o withdrawal. EtOH level was not obtained  Seems to nod / facial expression that she does have cocktails at [facility]      Review of Systems:  UTO  Symptom Y/N Comments  Symptom Y/N Comments   Fever/Chills    Chest Pain     Poor Appetite    Edema     Cough    Abdominal Pain     Sputum    Joint Pain     SOB/DALY    Pruritis/Rash     Nausea/vomit    Tolerating PT/OT     Diarrhea    Tolerating Diet     Constipation    Other         Current Facility-Administered Medications:     0.9% sodium chloride with KCl 20 mEq/L infusion, , IntraVENous, CONTINUOUS, Guillermo Pena MD, Last Rate: 50 mL/hr at 22, New Bag at 22 1756    metoprolol tartrate (LOPRESSOR) tablet 25 mg, 25 mg, Oral, BID, Dom Martinez MD, 25 mg at 06/07/22 1756    famotidine (PEPCID) tablet 20 mg, 20 mg, Oral, QPM, Dom Martinez MD, 20 mg at 06/07/22 1756    hydrALAZINE (APRESOLINE) tablet 25 mg, 25 mg, Oral, Q8H PRN, Abi Deleon MD    enoxaparin (LOVENOX) injection 40 mg, 40 mg, SubCUTAneous, DAILY, Abi Deleon MD, 40 mg at 06/07/22 1023    sodium chloride (NS) flush 5-40 mL, 5-40 mL, IntraVENous, Q8H, Kimberly, Melly FLORES MD, 5 mL at 06/07/22 0551    sodium chloride (NS) flush 5-40 mL, 5-40 mL, IntraVENous, PRN, Kimberly, Melly LFORES MD    magnesium hydroxide (MILK OF MAGNESIA) 400 mg/5 mL oral suspension 30 mL, 30 mL, Oral, DAILY PRN, NesraneAlethea MD    acetaminophen (TYLENOL) tablet 650 mg, 650 mg, Oral, Q6H PRN, Matilde Cintron MD, 650 mg at 06/01/22 2102    ondansetron (ZOFRAN) injection 4 mg, 4 mg, IntraVENous, Q4H PRN, Armida Krishna MD    Objective:     VITALS:   Patient Vitals for the past 12 hrs:   Temp Pulse Resp BP SpO2   06/07/22 1650 98.1 °F (36.7 °C) 94 20 125/69 94 %       Intake/Output Summary (Last 24 hours) at 6/7/2022 2250  Last data filed at 6/7/2022 1945  Gross per 24 hour   Intake --   Output 550 ml   Net -550 ml        PHYSICAL EXAM:  General: WD, WN. Alert, cooperative, no acute distress. Less likely to drift off to sleep during conversation today   EENT:  EOMI. Anicteric sclerae. MM dry  Resp:  CTA bilaterally, no wheezing or rales. No accessory muscle use  CV:  Regular  rhythm,  No edema  GI:  Soft, Non distended, Non tender. +Bowel sounds  Neurologic:  Alert, nods / facial expressions to questions,  Expressive aphasia    Moving all ext appropriately. Swallowing ice / apple sauce with effort  Psych:   Not anxious nor agitated  Skin:  No rashes.   No jaundice  Ext:  Contusion / Swelling R dorsal hand / wrist, tender to movement    LABS:  I reviewed today's most current labs and imaging studies. Pertinent labs include:  Recent Labs     06/06/22  0551   WBC 11.6*   HGB 10.2*   HCT 29.8*        Recent Labs     06/07/22  0539 06/06/22  0551 06/05/22  0752    135* 135*   K 3.5 3.7 3.0*    102 105   CO2 21 19* 17*   GLU 98 98 72   BUN 15 16 13   CREA 0.55 0.60 0.39*   CA 8.7 8.4* 7.1*   MG  --  2.0 1.3*       RADIOLOGY:  CT HEAD 5/29:  Motion limits evaluation. There is diffuse age-related parenchymal  volume loss. The ventricles and sulci are age-appropriate without hydrocephalus. There is no mass effect or midline shift. There is no intracranial hemorrhage or  extra-axial fluid collection. Areas of low attenuation in the periventricular  white matter represent stable chronic microvascular ischemic changes. The  gray-white matter differentiation is maintained. The basal cisterns are patent.   The osseous structures are intact. The visualized paranasal sinuses and mastoid air cells are clear.   IMPRESSION: No acute intracranial abnormality. pCXR 5/29:  The cardiomediastinal contours are within normal limits. The lungs and  pleural spaces are clear. There is no pneumothorax. There are degenerative  changes in the spine and shoulders. The upper abdomen is unremarkable.   IMPRESSION: No acute process. pCXR 6/2:  The cardiomediastinal contours are stable. The lungs and pleural  spaces are clear. There is no pneumothorax. The bones and upper abdomen are stable.   IMPRESSION: No acute process. R WRIST 6/4:  Two views of the right wrist demonstrate age-indeterminate distal  radius fracture deformity. Positive ulnar variance. Moderate to marked  osteoarthritis, most pronounced at the base of the thumb and interphalangeal  joints. Diffuse soft tissue swelling   IMPRESSION: Age indeterminate distal radius fracture deformity. MRI BRAIN 6/6:  Brain Parenchyma/Brainstem:  Mild generalized parenchymal volume loss. Extensive  confluent periventricular white matter signal abnormality. Small chronic lacunar  infarction posterior right thalamus. Mild patchy T2 hyperintensity in the lisandro. No acute infarction. IMPRESSION:  Extensive chronic white matter signal abnormality with no acute infarction. EKG 5/29:  NSR 64 bpm, PACs, LVH Voltage, NSTWC inferior    Procedures: see electronic medical records for all procedures/Xrays and details which were not copied into this note but were reviewed prior to creation of Plan. CONSULTS  ORTHO / MCCOIG  Assessment:  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 No acute fx noted   Plan:  Can discontinue the wrist splint unless pt prefers for comfort. NEURO / Nohemi Waite  Acute encephalopathy   Brain MRI revealed significant atrophy/volume loss and extensive chronic periventricular microvascular ischemic disease suggesting decreased cognitive reserve.   Differential includes associated with COVID infection, metabolic derangements, medication induced,  hospitalization leading to a delirium, stepwise decline of a vascular dementia given the widespread extensive nature of the chronic microvascular changes. The atrophy and chronic microvascular ischemic disease does suggest some element of baseline cognitive impairment. less likely seizures given her current examination. This very well may be multifactorial    TSH was normal.  I would recommend checking a vitamin B12 level and an ammonia level  Continue to replete electrolytes as needed  Continue physical therapy, Occupational Therapy, and speech therapy  Implement nonpharmacological behavioral measures to minimize risk of sundowning  Minimize cognitively impacting medications   Cerebrovascular disease:  She does have risk factors for stroke/TIA including hypertension, dyslipidemia  Brain MRI reveals a significant amount of chronic microvascular ischemic disease including Old chronic lacunar infarct  Patient should be on 81 mg asa daily.   Htn: goal sbp less than 140  Dyslipidemia: noted in past medical history. Not on statin therapy. Would recommend checking lipid panel  Should obtain carotid doppler study          Assessment / Plan:    Principal Problem:    AMS (altered mental status) (5/29/2022)  Active Problems:    Delirium (6/3/2022)  Received tx Haldol 2mg 5/30 till 6/1 for agitation  Trazodone 50mg qhs, will be stopped as well. Was not able to be given last night. ? Levaquin 5/30-6/1 for UTI could be a/w AMS post admission  Tx Xanax 5/30 till 6/1 as well  Had been combative. Has c/o pain in R UE mostly in wrist - radial  Fracture documented  Persistent Expressive Aphasia - but improving  MRI Brain scheduled for AM  PT/OT/ST - re-consulted PT/OT since she will be better to participate  Appreciate ST eval/recommendations today  Appreciate recommendation Dr. Dinorah Colin - Carotid Duplex, ASA 81mg, Lipid panel, Ammonia level  CALLED daughter today - was not able to reach, nobody came in during Neuro Consult as I requested      COVID-19 (5/30/2022)  Has been fully immunized  Received tx Paxlovid tid 5d PTA  Epocrates does not list delirium as a likely side effect  Suspect some global hypoxia during agitation / delirium  Suspect ?  Covid a/w encephalopathy  MRI appreciates chronic vascular disease w/o acute infarct or tumor      Recurrent UTI (6/5/2018)  F/u C&S obtained on 5/29 NSG  Tx Bactrim 5/30      LISBET (acute kidney injury) ([facility] [address] (5/30/2022)  F/u post IVF - 1/2 NS KCl improved BUN/Cr and K+  Switch to NS,  Mg run  Have tx  HypoK and HypoMg, however Non AG Met Acidosis still present  IV changed to NS with 20 meq/l KCl      HTN  Not able to give Toprol XL,  Change to Metoprolol 50mg bid and crush if needed  Cont Hydralazine IV prn      R Distal Radial Fracture  Splint stopped  No acute fx per Dr. Vernon Heads        ______________________________________________________________________  SAFETY:   Code Status:DNR  DVT prophylaxis:Lovenox  Stress Ulcer prophylaxis: Pepcid  Bladder catheter:no ([place])  Family Contact Info:  Primary Emergency Contact: Ct Hughes, Home Phone: [phone]  Bedded: [facility] Room 124/01  Disposition: TBD, likely return to [facility] 1-2 days  Admission status:  Inpatient    Reviewed most current lab test results and cultures  YES  Reviewed most current radiology test results   YES  Review and summation of old records today    NO  Reviewed patient's current orders and MAR    YES  PMH/SH reviewed - no change compared to H&P    Care Plan discussed with:                                   Comments  Patient x     Family      RN x     Care Manager x      Consultant   x Speech                       Multidiciplinary team rounds were held today with , nursing, pharmacist and clinical coordinator. Patient's plan of care was discussed; medications were reviewed and discharge planning was addressed.         ____________________________________________    Total NON Critical Care TIME:  35   Minutes        Comments   >50% of visit spent in counseling and coordination of care   x      Signed: Majo Hernandez MD  [facility] Hospitalist  [phone]

## 2022-06-08 NOTE — PROGRESS NOTES
Problem: Dysphagia (Adult)  Goal: *Acute Goals and Plan of Care (Insert Text)  Description: 6/3/2022  Speech path goals  1. Patient will tolerate reeval of swallowing. MET  2. Added 6/6/2022 Patient will tolerate puree/thin liquid diet with no overt s/s aspiration or adverse effects within 7 days  Outcome: Progressing Towards Goal     SPEECH LANGUAGE PATHOLOGY DYSPHAGIA TREATMENT  Patient: Arin Otto (75 y.o. female)  Date: 6/8/2022  Diagnosis: AMS (altered mental status) [R41.82]  Altered mental status [R41.82]  OYQXM-24 [U07.1] AMS (altered mental status)       Precautions: Contact    ASSESSMENT:  Patient continues to be followed by SLP and most recent SLP visit on 6/6 revealed continued AMS limiting patient's ability to efficiently take in PO. Puree/Thin Liquid diet was recommended when patient is awake, alert, and actively accepting with strict aspiration precautions. On this date, RN reported patient drowsy and observed to have significant oral residue in oral cavity. At time of SLP visit, patient awake yet observed to intermittently drift asleep, requiring minimal to moderate cues to maintain alertness. Oral care performed prior to PO intake. Patient continues to demonstrate difficulty consistently utilizing straw to sip thin liquid. Strong cough observed following initial sip of thin liquid, which was not reproducible on subsequent trials. Patient continues with delayed oral manipulation of ice chips and puree, leading to suspected delayed propulsion and suspected delayed swallow initiation. Patient declined further trials on this date. Suspect patient will be unlikely to take PO consistently to meet nutritional needs. Consider goals of care discussion related to nutrition and patient's risks of aspiration including history of dementia, history of COVID-19, fluctuating alertness, and acute encephalopathy/mental status.  Continue to recommend Puree/Thin Liquid with 1:1 assistance/supervision and strict aspiration precautions outlined below. RN and MD educated re: SLP recommendations. PLAN:  Recommendations and Planned Interventions:  -- Puree/Thin Liquid  -- 1:1 Assistance/Supervision with PO intake  -- Medication Crushed in Puree  -- Sitting Upright for all PO intake  -- Small, Single Bites/Sips  -- Make sure patient swallows/clears mouth prior to next bite  -- Alternate Liquids/Solids  -- Strict Oral Care    Patient continues to benefit from skilled intervention to address the above impairments. Continue treatment per established plan of care. Discharge Recommendations: To Be Determined     SUBJECTIVE:   Patient stated Nasim Jara when asked if her last name was Serenity Terrazas or Hoang Rosas. OBJECTIVE:   Cognitive and Communication Status:  Neurologic State: Lethargic,Confused  Orientation Level: Oriented to person,Other (Comment) (Assessed via yes/no questions with choices)  Cognition: Decreased attention/concentration,Decreased command following,Memory loss     Perseveration: No perseveration noted  Safety/Judgement: Decreased awareness of environment,Decreased awareness of need for assistance,Decreased insight into deficits    Dysphagia Treatment:  Oral Assessment:  Oral Assessment  Labial: Other (comment) (Unable to assess due to decreased command following)  Dentition: Full;Natural  Oral Hygiene: Dried food residue observed to be coating oral cavity   Lingual: Other (comment) (Unable to assess due to decreased command following)  Velum: Unable to visualize  Mandible: No impairment    P.O. Trials:  Patient Position: Upright in bed  Vocal quality prior to P.O.: No impairment  Consistency Presented: Ice chips; Thin liquid;Puree  How Presented: SLP-fed/presented;Spoon;Straw     Bolus Acceptance: Impaired  Bolus Formation/Control: Impaired  Type of Impairment: Delayed  Propulsion: Delayed (# of seconds)  Oral Residue: None  Initiation of Swallow: Delayed (# of seconds)     Aspiration Signs/Symptoms: Strong cough (With initial sip of thin liquid)                      Pain:             After treatment:   Patient left in no apparent distress in bed, Call bell within reach, and Nursing notified    COMMUNICATION/EDUCATION:   Patient is not appropriate for education given acute mental status. The patient's plan of care including recommendations, planned interventions, and recommended diet changes were discussed with: Registered nurse and Physician.      MANAV Clark  Time Calculation: 30 mins

## 2022-06-08 NOTE — PROGRESS NOTES
IDR Team; MD, Nursing, Care Manager, Physical therapy, Nursing Supervisor, Pharmacy and Dietician, met to review patient's plan of care. Discussed goals, interventions, barriers and progress. RUR: 14%  LOW    Team will continue to monitor progress and report any concerns to the physician and care management as indicated. Transition of Care Plan:    From neurology assessment, patient is being diagnosed with COVID related encephalopathy. According to speech therapist, patient needs Dareen Levels when eating and will need 1:1 feeding. Contacted DEMARCO Vazquez at assisted living at [facility], [place], operated by [facility] to give her an update and to inform her that discharge set for tomorrow, Thursday, 6/9/22. More medical information faxed to Courtney and she will pass the medical on to therapy, the  and charge nurse on the unit (Javad Barros RN DON not in today according to Courtney). Patient will be evaluated for skilled care placement at [facility], [place], operated by [facility].

## 2022-06-08 NOTE — PROGRESS NOTES
Received patient in bed. She is drowsy. There is a great deal of old food in her mouth. Vigorous mouth care done and she barely has a gag reflex when swab is all the way to back of throat. After all the old food removed from her mouth, attempted to give morning pills and feed her some breakfast.  She held the food in her mouth a long time and did not swallow. I asked . Keith Punt Keith Punt \"does your throat hurt? \". She nods her head \"yes\". Unable to give morning pills due to poor swallow, coughing with one small bite of puree. She also closes her eye and drifts off while trying to speak to her or feed her. Will discuss with MD at morning rounds. Addendum at 56:  Reported hypertension to MD and inability to take morning pills    Addendum at 1140:  Finally able to give morning meds with help from Speech Therapist.  Patient still closes her eyes and drifts off to sleep while we are speaking to her.

## 2022-06-09 VITALS
HEART RATE: 72 BPM | HEIGHT: 65 IN | WEIGHT: 165 LBS | BODY MASS INDEX: 27.49 KG/M2 | OXYGEN SATURATION: 94 % | RESPIRATION RATE: 20 BRPM | DIASTOLIC BLOOD PRESSURE: 80 MMHG | SYSTOLIC BLOOD PRESSURE: 168 MMHG | TEMPERATURE: 98.6 F

## 2022-06-09 PROBLEM — F01.50 VASCULAR DEMENTIA (HCC): Status: ACTIVE | Noted: 2022-06-09

## 2022-06-09 PROBLEM — F01.50 VASCULAR DEMENTIA (HCC): Chronic | Status: ACTIVE | Noted: 2022-06-09

## 2022-06-09 LAB
ANION GAP SERPL CALC-SCNC: 13 MMOL/L (ref 5–15)
BASOPHILS # BLD: 0.1 K/UL (ref 0–0.1)
BASOPHILS NFR BLD: 1 % (ref 0–1)
BUN SERPL-MCNC: 14 MG/DL (ref 6–20)
BUN/CREAT SERPL: 31 (ref 12–20)
CALCIUM SERPL-MCNC: 8.6 MG/DL (ref 8.5–10.1)
CHLORIDE SERPL-SCNC: 106 MMOL/L (ref 97–108)
CO2 SERPL-SCNC: 23 MMOL/L (ref 21–32)
CREAT SERPL-MCNC: 0.45 MG/DL (ref 0.55–1.02)
DIFFERENTIAL METHOD BLD: ABNORMAL
EOSINOPHIL # BLD: 0.3 K/UL (ref 0–0.4)
EOSINOPHIL NFR BLD: 4 % (ref 0–7)
ERYTHROCYTE [DISTWIDTH] IN BLOOD BY AUTOMATED COUNT: 13.2 % (ref 11.5–14.5)
GLUCOSE SERPL-MCNC: 85 MG/DL (ref 65–100)
HCT VFR BLD AUTO: 29.3 % (ref 35–47)
HGB BLD-MCNC: 10.1 G/DL (ref 11.5–16)
IMM GRANULOCYTES # BLD AUTO: 0.1 K/UL (ref 0–0.04)
IMM GRANULOCYTES NFR BLD AUTO: 1 % (ref 0–0.5)
LYMPHOCYTES # BLD: 1.3 K/UL (ref 0.8–3.5)
LYMPHOCYTES NFR BLD: 14 % (ref 12–49)
MCH RBC QN AUTO: 30.6 PG (ref 26–34)
MCHC RBC AUTO-ENTMCNC: 34.5 G/DL (ref 30–36.5)
MCV RBC AUTO: 88.8 FL (ref 80–99)
MONOCYTES # BLD: 1.3 K/UL (ref 0–1)
MONOCYTES NFR BLD: 13 % (ref 5–13)
NEUTS SEG # BLD: 6.3 K/UL (ref 1.8–8)
NEUTS SEG NFR BLD: 67 % (ref 32–75)
NRBC # BLD: 0 K/UL (ref 0–0.01)
NRBC BLD-RTO: 0 PER 100 WBC
PLATELET # BLD AUTO: 280 K/UL (ref 150–400)
PMV BLD AUTO: 13 FL (ref 8.9–12.9)
POTASSIUM SERPL-SCNC: 3 MMOL/L (ref 3.5–5.1)
RBC # BLD AUTO: 3.3 M/UL (ref 3.8–5.2)
SODIUM SERPL-SCNC: 142 MMOL/L (ref 136–145)
WBC # BLD AUTO: 9.4 K/UL (ref 3.6–11)

## 2022-06-09 PROCEDURE — 85025 COMPLETE CBC W/AUTO DIFF WBC: CPT

## 2022-06-09 PROCEDURE — 74011250636 HC RX REV CODE- 250/636: Performed by: INTERNAL MEDICINE

## 2022-06-09 PROCEDURE — 80048 BASIC METABOLIC PNL TOTAL CA: CPT

## 2022-06-09 PROCEDURE — 36415 COLL VENOUS BLD VENIPUNCTURE: CPT

## 2022-06-09 PROCEDURE — 74011250637 HC RX REV CODE- 250/637: Performed by: INTERNAL MEDICINE

## 2022-06-09 RX ORDER — POLYETHYLENE GLYCOL 400 AND PROPYLENE GLYCOL 4; 3 MG/ML; MG/ML
1 SOLUTION/ DROPS OPHTHALMIC AS NEEDED
Status: SHIPPED | COMMUNITY
Start: 2022-06-09

## 2022-06-09 RX ORDER — METOPROLOL TARTRATE 50 MG/1
25 TABLET ORAL 2 TIMES DAILY
Qty: 30 TABLET | Refills: 0 | Status: SHIPPED
Start: 2022-06-09

## 2022-06-09 RX ORDER — GUAIFENESIN 100 MG/5ML
81 LIQUID (ML) ORAL DAILY
Status: SHIPPED | COMMUNITY
Start: 2022-06-10

## 2022-06-09 RX ORDER — ATORVASTATIN CALCIUM 10 MG/1
10 TABLET, FILM COATED ORAL DAILY
Qty: 30 TABLET | Refills: 0 | Status: SHIPPED
Start: 2022-06-10

## 2022-06-09 RX ADMIN — ENOXAPARIN SODIUM 40 MG: 100 INJECTION SUBCUTANEOUS at 09:54

## 2022-06-09 RX ADMIN — ASPIRIN 81 MG 81 MG: 81 TABLET ORAL at 09:54

## 2022-06-09 RX ADMIN — POTASSIUM CHLORIDE AND SODIUM CHLORIDE: 900; 150 INJECTION, SOLUTION INTRAVENOUS at 05:44

## 2022-06-09 RX ADMIN — ATORVASTATIN CALCIUM 10 MG: 10 TABLET, FILM COATED ORAL at 09:54

## 2022-06-09 RX ADMIN — METOPROLOL TARTRATE 25 MG: 25 TABLET, FILM COATED ORAL at 09:54

## 2022-06-09 NOTE — DISCHARGE INSTRUCTIONS
Hospitalist Recommendations    Need significant assistance / encouragement to progress with oral intake  Speech Therapy felt she only had oral component problems  No significant risk for aspiration   Need to be certain she is not left following a meal without clearing the mouth  Monitor labs for hydration / renal function to assure adequate intake  Tube Feeding options have not been discussed to this point  [address], [address] [place] [address] from Nurse    PATIENT INSTRUCTIONS:      You may be retaining fluid if you have a history of heart failure or if you experience any of the following symptoms:  Weight gain of 3 pounds or more overnight or 5 pounds in a week, increased swelling in our hands or feet or shortness of breath while lying flat in bed. Please call your doctor as soon as you notice any of these symptoms; do not wait until your next office visit.     .  ___________________________________________________________________________________________________________________________________

## 2022-06-09 NOTE — PROGRESS NOTES
Problem: Falls - Risk of  Goal: *Absence of Falls  Description: Document Maribell Musa Fall Risk and appropriate interventions in the flowsheet. Outcome: Resolved/Met  Note: Fall Risk Interventions:  Mobility Interventions: Bed/chair exit alarm    Mentation Interventions: More frequent rounding,Toileting rounds    Medication Interventions: Bed/chair exit alarm    Elimination Interventions: Toileting schedule/hourly rounds    History of Falls Interventions: Bed/chair exit alarm         Problem: Hypertension  Goal: *Blood pressure within specified parameters  Outcome: Resolved/Met  Goal: *Fluid volume balance  Outcome: Resolved/Met  Goal: *Labs within defined limits  Outcome: Resolved/Met     Problem: Patient Education: Go to Patient Education Activity  Goal: Patient/Family Education  Outcome: Resolved/Met     Problem: Gas Exchange - Impaired  Goal: Absence of hypoxia  Outcome: Resolved/Met  Goal: Promote optimal lung function  Outcome: Resolved/Met     Problem: Breathing Pattern - Ineffective  Goal: Ability to achieve and maintain a regular respiratory rate  Outcome: Resolved/Met     Problem:  Body Temperature -  Risk of, Imbalanced  Goal: Ability to maintain a body temperature within defined limits  Outcome: Resolved/Met  Goal: Will regain or maintain usual level of consciousness  Outcome: Resolved/Met  Goal: Complications related to the disease process, condition or treatment will be avoided or minimized  Outcome: Resolved/Met     Problem: Isolation Precautions - Risk of Spread of Infection  Goal: Prevent transmission of infectious organism to others  Outcome: Resolved/Met     Problem: Nutrition Deficits  Goal: Optimize nutrtional status  Outcome: Resolved/Met     Problem: Risk for Fluid Volume Deficit  Goal: Maintain normal heart rhythm  Outcome: Resolved/Met  Goal: Maintain absence of muscle cramping  Outcome: Resolved/Met     Problem: Loneliness or Risk for Loneliness  Goal: Demonstrate positive use of time alone when socialization is not possible  Outcome: Resolved/Met     Problem: Fatigue  Goal: Verbalize increase energy and improved vitality  Outcome: Resolved/Met     Problem: Patient Education: Go to Patient Education Activity  Goal: Patient/Family Education  Outcome: Resolved/Met     Problem: Urinary Tract Infection - Adult  Goal: *Absence of infection signs and symptoms  Outcome: Resolved/Met     Problem: Patient Education: Go to Patient Education Activity  Goal: Patient/Family Education  Outcome: Resolved/Met     Problem: Patient Education: Go to Patient Education Activity  Goal: Patient/Family Education  Outcome: Resolved/Met     Problem: Pressure Injury - Risk of  Goal: *Prevention of pressure injury  Description: Document Mark Scale and appropriate interventions in the flowsheet.   Outcome: Resolved/Met     Problem: Patient Education: Go to Patient Education Activity  Goal: Patient/Family Education  Outcome: Resolved/Met     Problem: Patient Education: Go to Patient Education Activity  Goal: Patient/Family Education  Outcome: Resolved/Met     Problem: Aspiration - Risk of  Goal: *Absence of aspiration  Outcome: Resolved/Met     Problem: Patient Education: Go to Patient Education Activity  Goal: Patient/Family Education  Outcome: Resolved/Met     Problem: Patient Education: Go to Patient Education Activity  Goal: Patient/Family Education  Outcome: Resolved/Met     Problem: Patient Education: Go to Patient Education Activity  Goal: Patient/Family Education  Outcome: Resolved/Met

## 2022-06-09 NOTE — PROGRESS NOTES
TRANSFER - OUT REPORT:    Verbal report given to Nurse Suzi(name) on Sridhar Curran  being transferred to [facility]) for routine progression of care (d/c back to [address] [place] [address])      Report consisted of patients Situation, Background, Assessment and   Recommendations(SBAR). Swallowing and dysphagia discussed. Underarm fungal rash discussed.       Patient transported with:   Tech  = [facility] Team @7005

## 2022-06-09 NOTE — PROGRESS NOTES
Elaina Meadows 104,  RN advises to arranged BLS transportation from [facility]# 124 to [facility].   Arranged BLS transport w/[facility] (Nu) - ETA of 1345 given - updated ED staff w/ETA

## 2022-06-09 NOTE — PROGRESS NOTES
Follow up visit withpatient in  124 in Med/Surg  Patient was alone during the visit and was happy to be returning to   [facility].  Provided empathic lisening and spiritual support  Advised of  Availability   [address] [place] [address]

## 2022-06-09 NOTE — DISCHARGE SUMMARY
[facility]  Hospitalist Discharge Summary    Patient ID:  Sergey Lewis  [id number]  80 y.o.  7/1/1933    PCP on record: Charlene Allen NP    Admit date: 5/29/2022  Discharge date and time: 6/9/2022     DISCHARGE DIAGNOSIS:    Principal Problem:    AMS (altered mental status) (5/29/2022)    Active Problems:    Delirium (6/3/2022)    Vascular dementia ([address] (6/9/2022)    COVID-19 (5/30/2022)    Recurrent UTI (6/5/2018)    LISBET (acute kidney injury) ([facility] [address] (5/30/2022)    Hypertension ()    Fracture of distal end of right radius (6/4/2022)    CONSULTATIONS:    ORTHOPEDIC SURGERY- [facility] [place]  Assessment:  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 No acute fx noted   Plan:  Can discontinue the wrist splint unless pt prefers for comfort. NEUROLOGY - VOTA  The patient is an 70-year-old female with multiple medical conditions who presents to the hospital with acute encephalopathy. She does have a history of a recent urinary tract infection as well as acute COVID infection. Her examination does reveal a suppressed mental status but no obvious focal weakness or sensory deficit. She was able to follow one-step commands for me consistently. I am unclear of her baseline   Acute encephalopathy:   Brain MRI revealed significant atrophy/volume loss and extensive chronic periventricular microvascular ischemic disease suggesting decreased cognitive reserve.   Differential includes associated with COVID infection, metabolic derangements, medication induced,  hospitalization leading to a delirium, stepwise decline of a vascular dementia given the widespread extensive nature of the chronic microvascular changes. The atrophy and chronic microvascular ischemic disease does suggest some element of baseline cognitive impairment. less likely seizures given her current examination.   This very well may be multifactorial    TSH was normal.  I would recommend checking a vitamin B12 level and an ammonia level  Continue to replete electrolytes as needed  Continue physical therapy, Occupational Therapy, and speech therapy  Implement nonpharmacological behavioral measures to minimize risk of sundowning  Minimize cognitively impacting medications   Cerebrovascular disease:  She does have risk factors for stroke/TIA including hypertension, dyslipidemia  Brain MRI reveals a significant amount of chronic microvascular ischemic disease including Old chronic lacunar infarct  Patient should be on 81 mg asa daily. Htn: goal sbp less than 140  Dyslipidemia: noted in past medical history. Not on statin therapy. Would recommend checking lipid panel  Should obtain carotid doppler study      Excerpted HPI from H&P of Katelynn Rey MD:  This is a 26-year-old female patient admitted overnight from the emergency department with a diagnosis of altered mental status. Patient has past medical history significant for hypertension, GERD, recurrent UTI, anxiety disorder and has recently been diagnosed with COVID on May 26 and has been on monoclonal antibody treatment. Patient was transferred from [facility] living due to altered mental status. As the patient's continued to be in altered mental status awake, nonverbal and not cooperative could not get history from the patient. Documentation is based on review of ED records and family's report. As mentioned below 'Khadra Hughes is a 80 y. o. female, pmhx see below, who was brought to the ED by EMS because of alterred mental status. Staff and daughters report that she has been a lot different from her usual self this evening. She is usually very sharp and witty, but this afternoon she became acutely confused. Of note, the patient was diagnosed with COVID-19 last week and has just completed a course of monoclonal antibodies for treatment. She has not had a cough, fevers, or dyspnea, but rather fatigue and myalgias.  No vomiting or diarrhea; her appetite has been diminished for the last several days. She has also been treated for a UTI over the last week. This evening, she became confused and trouble with her speech, so she was sent to the ED for evaluation. ______________________________________________________________________  DISCHARGE SUMMARY/HOSPITAL COURSE:  for full details see H&P, daily progress notes, labs, consult notes. Pt admitted 5/30 with AMS. Dx COVID at [facility] 5/26 tx Monoclonal Ab.and Paxlovid, before referred to ED with agitated state. AMS / Nonverbal and not cooperative initially tx Haldol, Xanax. No h/o cough, fever, dyspnea. Mostly c/o fatigue / myalgia. No GI upset. Had received tx for UTI about a week ago. Tx Haldol / Xanax till 6/1. Tx Levaquin 5/30 till 6/1. Having pain / swelling R wrist - xray noted for distal radial fx - Dr. Henrique Andino feels this is old (family noted trauma falling about 3 months ago). Has been more alert and compliant, but remains altered with expressive aphasia. No focal weakness observed. Pockets food with oral problems per ST. This will make for nutritional problems,  Will be returning to [facility] tomorrow Thursday    Principal Problem:    AMS (altered mental status) (5/29/2022)  Active Problems:    Delirium (6/3/2022)    Vascular dementia ([facility] [address] (6/9/2022)  Received tx Haldol 2mg 5/30 till 6/1 for agitation  Trazodone 50mg qhs, stopped as well.   ? Levaquin 5/30-6/1 for UTI could be a/w AMS post admission  Tx Xanax 5/30 till 6/1 as well  Had been combative. Has c/o pain in R UE mostly in wrist - radial  Fracture and shoulder DJD / Rotator Cuff problem  documented  Persistent Expressive Aphasia - but improving.   Cognitive impairment  MRI Brain 6/7 noted for chronic vascular disease  PT/OT/ST - re-consulted PT/OT since she will be better to participate  Appreciate ST eval/recommendations -  Pureed diet / coaching  Appreciate recommendation Dr. Inés Jorgensen - Carotid Duplex, ASA 81mg, Lipid panel, Ammonia level  CALLED daughter 6/8 and discussed finding and plans at length  Limit medications as able,  Coaching for fluid intake needed      COVID-19 (5/30/2022)  Has been fully immunized  Received tx Paxlovid tid 5d PTA and ? Monoclonal Ab as well at [facility] prior to adm  Epocrates does not list delirium as a likely side effect  Suspect underlying dementia with Covid related encephalopathy  MRI appreciates chronic vascular disease w/o acute infarct or tumor   Have added ASA / Lipitor to treatment for HTN  SARS-COV-2 testing 5/26 reported on Epic was WILLIAN  (not PCR)      Recurrent UTI (6/5/2018)  F/u C&S obtained on 5/29 NSG  Tx Bactrim stopped 5/30      LISBET (acute kidney injury) ([facility] [address] (5/30/2022)  Initial IVF 1/2 NS KCl switched to NS  Give Mg runs  Have tx  HypoK and HypoMg and Non AG Met Acidosis  F/u improved  Cont EfferK  Hope to see improved po intake / fluid intake - needs encouragement / guidance  May need some IVF short-term,  if gets behind      Hypertension ()  Cont tx Metoprolol 25 mg bid and crush if needed  Will need some titration to keep systolic BP below 136      Fracture of distal end of right radius (6/4/2022)  Splint stopped  No acute fx per Dr. Jamila Mann  CXR also noted for R Shoulder Rotator Cuff Tear  Family did comment on trauma falling several months ago  _______________________________________________________________________  Patient seen and examined by me on discharge day. Pertinent Findings:  Visit Vitals  BP (!) 182/78 (BP 1 Location: Left upper arm, BP Patient Position: At rest)   Pulse 86   Temp 98.6 °F (37 °C)   Resp 20   Ht 5' 5\" (1.651 m)   Wt 74.8 kg (165 lb)   SpO2 94%   Breastfeeding No   BMI 27.46 kg/m²     Gen:    Not in distress  Chest: Nonlabored respiration, Clear lungs  CVS:   Regular rhythm.   No edema  Abd:  Soft, not distended, not tender  Neuro:  Alert, nonfocal, weak    LABS:  Results for Nena Shah (MRN [id number]) as of 6/9/2022 11:42   5/29/2022 20:37 5/31/2022 05:45 6/1/2022 06:06 6/4/2022 07:10 6/6/2022 05:51 6/9/2022 05:31   WBC 8.4 7.8 7.8 13.7 (H) 11.6 (H) 9.4   NRBC 0.0 0.0 0.0 0.0 0.0 0.0   RBC 3.78 (L) 4.21 4.02 3.81 3.42 (L) 3.30 (L)   HGB 11.3 (L) 12.7 12.0 11.4 (L) 10.2 (L) 10.1 (L)   HCT 32.0 (L) 35.9 34.2 (L) 33.0 (L) 29.8 (L) 29.3 (L)   MCV 84.7 85.3 85.1 86.6 87.1 88.8   MCH 29.9 30.2 29.9 29.9 29.8 30.6   MCHC 35.3 35.4 35.1 34.5 34.2 34.5   RDW 12.8 12.7 12.5 12.9 13.2 13.2   PLATELET 978 421 171 227 222 280   MPV 12.6 13.1 (H) 13.3 (H) 13.4 (H) 13.0 (H) 13.0 (H)   NEUTROPHILS 64 56  77 (H) 75 67   LYMPHOCYTE 18 25  11 (L) 10 (L) 14   MONOCYTES 14 (H) 15 (H)  11 13 13   EOSINOPHILS 1 2  0 1 4     Results for Lian Conner (MRN [id number]) as of 6/9/2022 11:42   5/29/2022 21:09   Color YELLOW/STRAW   Appearance CLEAR   Specific gravity 1.025   pH (UA) 6.0   Protein TRACE (A)   Glucose Negative   Ketone TRACE (A)   Blood TRACE (A)   Bilirubin Negative   Urobilinogen 0.2   Nitrites Negative   Leukocyte Esterase Negative   Epithelial cells FEW   WBC 5-10   RBC 0-5   Bacteria 1+ (A)     Results for Lian Conner (MRN [id number]) as of 6/9/2022 11:42   6/4/2022 07:10   D-dimer 2.57 (H)     Results for Lian Conner (MRN [id number]) as of 6/9/2022 11:42   5/29/2022 20:37 5/31/2022 05:45 5/31/2022 05:45 6/1/2022 06:06 6/4/2022 07:10 6/5/2022 07:52 6/6/2022 05:51 6/7/2022 05:39 6/9/2022 05:31   Sodium 127 (L) 129 (L) 130 (L) 128 (L) 127 (L) 135 (L) 135 (L) 139 142   Potassium 4.1 3.8 3.8 3.4 (L) 4.1 3.0 (L) 3.7 3.5 3.0 (L)   Chloride 93 (L) 94 (L) 95 (L) 96 (L) 95 (L) 105 102 104 106   CO2 22 21 22 18 (L) 17 (L) 17 (L) 19 (L) 21 23   Anion gap 12 14 13 14 15 13 14 14 13   Glucose 98 76 77 84 95 72 98 98 85   BUN 19 9 9 11 14 13 16 15 14   Creatinine 1.35 (H) 0.72 0.75 0.61 0.59 0.39 (L) 0.60 0.55 0.45 (L)   BUN/Cr ratio 14 13 12 18 24 (H) 33 (H) 27 (H) 27 (H) 31 (H)   Calcium 9.0 8.8 8.7 8.7 8.4 (L) 7.1 (L) 8.4 (L) 8.7 8.6   Magnesium   1.6  1.4 (L) 1.3 (L) 2.0     GFR  non-AA 37 (L) >60 >60 >60 >60 >60 >60 >60 >60   Bilirubin, total 0.5  0.7  1.0       Protein, total 6.6  6.6  6.2 (L)       Albumin 3.5  3.3 (L)  2.3 (L)       Globulin 3.1  3.3  3.9       A-G Ratio 1.1  1.0 (L)  0.6 (L)       ALT 25  31  30       AST 29  39 (H)  25       Alk. phos 69  75  70       Lactic acid 1.0           Uric acid     5.7       Triglyceride         64   Chol, total         155   HDL Chol         34   CHOL/HDL          4.6   VLDL, calc         12.8   LDL, calc         108   Vitamin B12        >2,000    Folate       4.9     Ferritin     319 (H)       Ammonia         24   C-R protein     22.10 (H)   22.70 (H)      Results for Judge Pedroza (MRN [id number]) as of 6/9/2022 11:42   6/7/2022 06:06   TSH 1.23     CULTURES  Urine 5/29:  [place] < 10,000 cfu/ml    Results for Judge Pedroza (MRN [id number]) as of 6/9/2022 11:42   5/26/2022 00:00   SARS-CoV-2, WILLIAN detected (A)     RADIOLOGY:  CT HEAD 5/29:  Motion limits evaluation. There is diffuse age-related parenchymal  volume loss. The ventricles and sulci are age-appropriate without hydrocephalus. There is no mass effect or midline shift. There is no intracranial hemorrhage or  extra-axial fluid collection. Areas of low attenuation in the periventricular  white matter represent stable chronic microvascular ischemic changes. The  gray-white matter differentiation is maintained. The basal cisterns are patent.   The osseous structures are intact. The visualized paranasal sinuses and mastoid air cells are clear.   IMPRESSION: No acute intracranial abnormality.     pCXR 5/29:  The cardiomediastinal contours are within normal limits. The lungs and  pleural spaces are clear. There is no pneumothorax. There are degenerative  changes in the spine and shoulders. The upper abdomen is unremarkable.   IMPRESSION: No acute process.      pCXR 6/2:  The cardiomediastinal contours are stable. The lungs and pleural  spaces are clear.  There is no pneumothorax. The bones and upper abdomen are stable.   IMPRESSION: No acute process.      R WRIST 6/4:  Two views of the right wrist demonstrate age-indeterminate distal  radius fracture deformity. Positive ulnar variance. Moderate to marked  osteoarthritis, most pronounced at the base of the thumb and interphalangeal  joints. Diffuse soft tissue swelling   IMPRESSION: Age indeterminate distal radius fracture deformity. pCXR 6/8:  Portable AP upright views of the chest were obtained. The cardiac silhouette  is grossly normal in size. There is no evidence of active lung disease. There is  evidence of degenerative change involving both shoulders (right greater than  left). There is marked narrowing of the subacromial space on the right. This is  likely associated with a rotator cuff abnormality.   IMPRESSION:  No evidence of active lung disease.     MRI BRAIN 6/6:  Brain Parenchyma/Brainstem:  Mild generalized parenchymal volume loss. Extensive  confluent periventricular white matter signal abnormality. Small chronic lacunar  infarction posterior right thalamus. Mild patchy T2 hyperintensity in the lisandro. No acute infarction. IMPRESSION:  Extensive chronic white matter signal abnormality with no acute infarction.     CAROTID DUPLEX 6/8:  Mild (<50%) stenosis of both ICAs  Mild and heterogeneous plaque in the right ICA  Mild plaque in the left ICA     EKG 5/29:  NSR 64 bpm, PACs, LVH Voltage, NSTWC inferior     _______________________________________________________________________  DISCHARGE MEDICATIONS:     Current Discharge Medication List      START taking these medications    Details   aspirin 81 mg chewable tablet Take 1 Tablet by mouth daily. Start date: 6/10/2022      atorvastatin (LIPITOR) 10 mg tablet Take 1 Tablet by mouth daily. Qty: 30 Tablet, Refills: 0  Start date: 6/10/2022      potassium bicarb-citric acid (Effer-K) 10 mEq tablet Take 2 Tablets by mouth daily.   Qty: 30 Tablet, Refills: 0  Start date: 6/9/2022         CONTINUE these medications which have CHANGED    Details   metoprolol tartrate (LOPRESSOR) 50 mg tablet Take 0.5 Tablets by mouth two (2) times a day. Qty: 30 Tablet, Refills: 0  Start date: 6/9/2022      peg 400-propylene glycol (Systane, propylene glycoL,) 0.4-0.3 % drop Administer 1 Drop to both eyes as needed for Pain (dryness). Start date: 6/9/2022         CONTINUE these medications which have NOT CHANGED    Details   bisacodyL (Dulcolax, bisacodyl,) 10 mg supp Insert 10 mg into rectum daily as needed for Constipation. VITAMIN B-12 1,000 mcg/mL injection 1 mL by IntraMUSCular route every month. Qty: 2 Vial, Refills: 4    Associated Diagnoses: B12 deficiency      acetaminophen (TYLENOL EXTRA STRENGTH) 500 mg tablet Take 1,000 mg by mouth every six (6) hours as needed for Pain.          STOP taking these medications       cholecalciferol, vitamin D3, (Vitamin D3) 50 mcg (2,000 unit) tab Comments:   Reason for Stopping:         famotidine (Pepcid) 20 mg tablet Comments:   Reason for Stopping:         lidocaine-menthol 4-4 % ptmd Comments:   Reason for Stopping:         sodium chloride 1 gram tablet Comments:   Reason for Stopping:         nirmatrelvir-ritonavir (PAXLOVID) 150 mg x 2- 100 mg tablet Comments:   Reason for Stopping:         nirmatrelvir-ritonavir (PAXLOVID) 150 mg x 2- 100 mg tablet Comments:   Reason for Stopping:         trimethoprim-sulfamethoxazole (Bactrim DS) 160-800 mg per tablet Comments:   Reason for Stopping:         traZODone (DESYREL) 50 mg tablet Comments:   Reason for Stopping:         valACYclovir (VALTREX) 1 gram tablet Comments:   Reason for Stopping:         fluticasone propionate (Flonase Allergy Relief) 50 mcg/actuation nasal spray Comments:   Reason for Stopping:         bismuth subsalicylate (PEPTO-BISMOL) 262 mg chew Comments:   Reason for Stopping:         loperamide (Imodium A-D) 1 mg/7.5 mL solution Comments:   Reason for Stopping: psyllium (METAMUCIL) powd Comments:   Reason for Stopping:         magnesium hydroxide (Iraheta Milk of Magnesia) 400 mg/5 mL suspension Comments:   Reason for Stopping:         HYDROmorphone (Dilaudid) 1 mg/mL liqd oral solution Comments:   Reason for Stopping:         DULoxetine (CYMBALTA) 20 mg capsule Comments:   Reason for Stopping:         ergocalciferol (VITAMIN D2) 50,000 unit capsule Comments:   Reason for Stopping:         aspirin-caffeine (THIAGO BACK AND BODY) 500-32.5 mg tab Comments:   Reason for Stopping:         acetaminophen (TYLENOL ARTHRITIS PAIN) 650 mg TbER Comments:   Reason for Stopping:         dilTIAZem CD (CARDIZEM CD) 240 mg ER capsule Comments:   Reason for Stopping:         triamcinolone (NASACORT) 55 mcg nasal inhaler Comments:   Reason for Stopping:         melatonin 5 mg cap capsule Comments:   Reason for Stopping:         Cetirizine (ZYRTEC) 10 mg cap Comments:   Reason for Stopping:         ibuprofen 200 mg cap Comments:   Reason for Stopping:         D-MANNOSE PO Comments:   Reason for Stopping:         cranberry fruit extract (THERACRAN PO) Comments:   Reason for Stopping:         ALPRAZolam (XANAX) 0.25 mg tablet Comments:   Reason for Stopping:                   Recommended  Diet: pureed regular  Activity: Bedrest, ROM, Transfers to chair  Wound Care: none  Follow-up labs: f/u BMP, CBC, Mg in 4-7 days    Hospitalist Recommendations  Need significant assistance / encouragement to progress with oral intake  Speech Therapy felt she only had oral component problems  No significant risk for aspiration   Need to be certain she is not left following a meal without clearing the mouth  Monitor labs for hydration / renal function to assure adequate intake  Tube Feeding options have not been discussed to this point  [address] [place]   [phone]    ______________________________________________________________________  DISPOSITION:    Home with Family:    Home with HH/PT/OT/RN:    SNF/LTC: [address] [place] [address] CECILIA:    OTHER:        Condition at Discharge:  Stable  _____________________________________________________________________  Follow up with:   PCP : Haider Reina NP  Follow-up Information     Follow up With Specialties Details Why Contact Info    Haider Reina NP Nurse Practitioner           Total time in minutes spent coordinating this discharge (includes going over instructions, follow-up, prescriptions, and preparing report for sign off to her PCP) :35 minutes    Signed:  Neema Colon MD  [facility] Hospitalist  [phone]

## 2022-06-09 NOTE — PROGRESS NOTES
Problem: Falls - Risk of  Goal: *Absence of Falls  Description: Document Diana Dolan Fall Risk and appropriate interventions in the flowsheet.   6/9/2022 0106 by Akshat Mcdonald RN  Outcome: Progressing Towards Goal  Note: Fall Risk Interventions:  Mobility Interventions: Bed/chair exit alarm    Mentation Interventions: Bed/chair exit alarm    Medication Interventions: Bed/chair exit alarm    Elimination Interventions: Call light in reach    History of Falls Interventions: Bed/chair exit alarm      6/9/2022 0032 by Akshat Mcdonald RN  Outcome: Progressing Towards Goal  Note: Fall Risk Interventions:  Mobility Interventions: Bed/chair exit alarm    Mentation Interventions: Bed/chair exit alarm    Medication Interventions: Bed/chair exit alarm    Elimination Interventions: Call light in reach    History of Falls Interventions: Bed/chair exit alarm         Problem: Hypertension  Goal: *Blood pressure within specified parameters  6/9/2022 0106 by Akshat Mcdonald RN  Outcome: Progressing Towards Goal  6/9/2022 0032 by Akshat Mcdonald RN  Outcome: Progressing Towards Goal  Goal: *Fluid volume balance  6/9/2022 0106 by Akshat Mcdonald RN  Outcome: Progressing Towards Goal  6/9/2022 0032 by Akshat Mcdonald RN  Outcome: Progressing Towards Goal  Goal: *Labs within defined limits  Outcome: Progressing Towards Goal

## 2022-06-09 NOTE — PROGRESS NOTES
Patient is awake and alert. Speaking in whole sentences. She was fed some apple sauce and some yogurt with no coughing or gagging this morning. Meds crushed in A. Sauce. Mouth care done after breakfast.    There is a red fungal rash under arms and groin. Antifungal applied.

## 2022-06-09 NOTE — PROGRESS NOTES
Pt turned and repositioned for comfort by staff. Mouth care provided & pure wick changed. Urine output from external catheter this shift was 850 ml. Pt has been alert , though minimal verbal response. BP this shift 125/63. Fall precautions remain in place.

## 2022-06-09 NOTE — PROGRESS NOTES
Bedside shift change report given to EMILIANA Morse RN (oncoming nurse) by Jaquelin Portillo RN (offgoing nurse). Report included the following information Kardex.

## 2022-11-06 ENCOUNTER — HOSPITAL ENCOUNTER (OUTPATIENT)
Dept: LAB | Age: 87
Discharge: HOME | End: 2022-11-06
Payer: MEDICARE

## 2022-11-06 PROCEDURE — 87077 CULTURE AEROBIC IDENTIFY: CPT

## 2022-11-06 PROCEDURE — 87086 URINE CULTURE/COLONY COUNT: CPT

## 2022-11-06 PROCEDURE — 87186 SC STD MICRODIL/AGAR DIL: CPT

## 2022-11-09 LAB
BACTERIA SPEC CULT: ABNORMAL
CC UR VC: ABNORMAL
SERVICE CMNT-IMP: ABNORMAL

## 2022-11-14 ENCOUNTER — HOSPITAL ENCOUNTER (OUTPATIENT)
Dept: LAB | Age: 87
Discharge: HOME | End: 2022-11-14
Payer: COMMERCIAL

## 2022-11-14 ENCOUNTER — HOSPITAL ENCOUNTER (OUTPATIENT)
Dept: LAB | Age: 87
Discharge: HOME | End: 2022-11-14

## 2022-11-14 PROCEDURE — 87086 URINE CULTURE/COLONY COUNT: CPT

## 2022-11-15 LAB
BACTERIA SPEC CULT: NORMAL
SERVICE CMNT-IMP: NORMAL

## 2022-12-13 NOTE — PROGRESS NOTES
Care Management Interventions  PCP Verified by CM: Yes (Followed regularly by clinicians at [facility])  Palliative Care Criteria Met (RRAT>21 & CHF Dx)?: No  Mode of Transport at Discharge: Other (see comment) (TBD)  Transition of Care Consult (CM Consult): Discharge Planning,Assisted Living ([facility])  Parth Signup: No  Discharge Durable Medical Equipment: No  Physical Therapy Consult: Yes  Occupational Therapy Consult: Yes  Speech Therapy Consult: No  Support Systems: Child(lou),Long Term Care Facility,Assisted Living  Confirm Follow Up Transport: Other (see comment) (TBD)   Resource Information Provided?: No  Discharge Location  Patient Expects to be Discharged to[de-identified] Skilled nursing facility ([address] [place] [address])    Patient is being discharged today back to [facility]. She will return to [facility]. Medicare pt has received, reviewed, and signed 2nd IM letter informing them of their right to appeal the discharge. Signed copy has been placed on pt bedside chart. Spoke with patient's daughter Kareem Still. She is aware and agrees with discharge plan. Told her to call for any further questions or concerns. RUR: 15% MODERATE    Transition of Care (ALEX) Plan:  Home     ALEX Transportation:       How is patient being transported at discharge? POV     When? ETA 1345     Is transport scheduled? Yes    Follow-up appointment and transportation:     PCP? Luis Enrique Ahmadi--[facility] to arrange f/u visit. Who is transporting to the follow-up appointment? N/a will be in healthcare center. Is transport for follow up appointment scheduled? n/a    Communication plan (with patient/family): Patient is aware and agrees with discharge plan. Who is being called? Patient or Next of Kin? Responsible party? Patient's daughter Kareem Still       What number(s) is to be used? [phone]      What service provider is calling for [facility] services?  [facility] When are they calling?  24--48  Hours prior to sathya       Click here to complete [address] including selection of the Healthcare Decision Maker Relationship (ie \"Primary\")  @[email or web address] Yes

## 2023-04-22 DIAGNOSIS — Z12.31 VISIT FOR SCREENING MAMMOGRAM: Primary | ICD-10-CM

## 2024-04-04 ENCOUNTER — HOSPITAL ENCOUNTER (OUTPATIENT)
Facility: HOSPITAL | Age: 89
Discharge: HOME | End: 2024-04-04
Payer: MEDICARE

## 2024-04-04 ENCOUNTER — TRANSCRIBE ORDERS (OUTPATIENT)
Facility: HOSPITAL | Age: 89
End: 2024-04-04

## 2024-04-04 DIAGNOSIS — M25.512 BILATERAL SHOULDER PAIN, UNSPECIFIED CHRONICITY: ICD-10-CM

## 2024-04-04 DIAGNOSIS — M25.511 BILATERAL SHOULDER PAIN, UNSPECIFIED CHRONICITY: Primary | ICD-10-CM

## 2024-04-04 DIAGNOSIS — M25.512 BILATERAL SHOULDER PAIN, UNSPECIFIED CHRONICITY: Primary | ICD-10-CM

## 2024-04-04 DIAGNOSIS — M25.511 BILATERAL SHOULDER PAIN, UNSPECIFIED CHRONICITY: ICD-10-CM

## 2024-04-04 PROCEDURE — 73030 X-RAY EXAM OF SHOULDER: CPT

## 2024-05-15 ENCOUNTER — TRANSCRIBE ORDERS (OUTPATIENT)
Facility: HOSPITAL | Age: 89
End: 2024-05-15

## 2024-05-15 ENCOUNTER — HOSPITAL ENCOUNTER (OUTPATIENT)
Facility: HOSPITAL | Age: 89
Discharge: HOME | End: 2024-05-18
Payer: MEDICARE

## 2024-05-15 DIAGNOSIS — M25.511 ACUTE PAIN OF RIGHT SHOULDER: Primary | ICD-10-CM

## 2024-05-15 DIAGNOSIS — M25.512 ACUTE PAIN OF LEFT SHOULDER: ICD-10-CM

## 2024-05-15 DIAGNOSIS — M25.511 ACUTE PAIN OF RIGHT SHOULDER: ICD-10-CM

## 2024-05-15 PROCEDURE — 73030 X-RAY EXAM OF SHOULDER: CPT

## 2024-05-27 ENCOUNTER — HOSPITAL ENCOUNTER (OUTPATIENT)
Facility: HOSPITAL | Age: 89
Setting detail: SPECIMEN
Discharge: HOME | End: 2024-05-30
Payer: MEDICARE

## 2024-05-27 LAB
ALBUMIN SERPL-MCNC: 3.2 G/DL (ref 3.5–5)
ALBUMIN/GLOB SERPL: 1.2 (ref 1.1–2.2)
ALP SERPL-CCNC: 59 U/L (ref 45–117)
ALT SERPL-CCNC: 29 U/L (ref 12–78)
ANION GAP SERPL CALC-SCNC: 10 MMOL/L (ref 5–15)
AST SERPL-CCNC: 19 U/L (ref 15–37)
BILIRUB SERPL-MCNC: 0.7 MG/DL (ref 0.2–1)
BUN SERPL-MCNC: 17 MG/DL (ref 6–20)
BUN/CREAT SERPL: 25 (ref 12–20)
CALCIUM SERPL-MCNC: 8.1 MG/DL (ref 8.5–10.1)
CHLORIDE SERPL-SCNC: 97 MMOL/L (ref 97–108)
CO2 SERPL-SCNC: 25 MMOL/L (ref 21–32)
CREAT SERPL-MCNC: 0.68 MG/DL (ref 0.55–1.02)
GLOBULIN SER CALC-MCNC: 2.6 G/DL (ref 2–4)
GLUCOSE SERPL-MCNC: 82 MG/DL (ref 65–100)
POTASSIUM SERPL-SCNC: 4.4 MMOL/L (ref 3.5–5.1)
PROT SERPL-MCNC: 5.8 G/DL (ref 6.4–8.2)
SODIUM SERPL-SCNC: 132 MMOL/L (ref 136–145)

## 2024-05-27 PROCEDURE — 80053 COMPREHEN METABOLIC PANEL: CPT

## 2024-09-11 ENCOUNTER — HOSPITAL ENCOUNTER (OUTPATIENT)
Facility: HOSPITAL | Age: 89
Setting detail: SPECIMEN
Discharge: HOME | End: 2024-09-14
Payer: MEDICARE

## 2024-09-11 PROCEDURE — 87086 URINE CULTURE/COLONY COUNT: CPT

## 2024-09-15 LAB
BACTERIA SPEC CULT: ABNORMAL
CC UR VC: ABNORMAL
SERVICE CMNT-IMP: ABNORMAL

## 2024-11-10 ENCOUNTER — HOSPITAL ENCOUNTER (OUTPATIENT)
Facility: HOSPITAL | Age: 89
Setting detail: SPECIMEN
Discharge: HOME | End: 2024-11-13
Payer: MEDICARE

## 2024-11-10 PROCEDURE — 87086 URINE CULTURE/COLONY COUNT: CPT

## 2024-11-10 PROCEDURE — 87088 URINE BACTERIA CULTURE: CPT

## 2024-11-10 PROCEDURE — 87186 SC STD MICRODIL/AGAR DIL: CPT

## 2024-11-12 LAB
BACTERIA SPEC CULT: ABNORMAL
CC UR VC: ABNORMAL
SERVICE CMNT-IMP: ABNORMAL

## 2024-12-20 ENCOUNTER — HOSPITAL ENCOUNTER (OUTPATIENT)
Facility: HOSPITAL | Age: 88
Setting detail: SPECIMEN
Discharge: HOME | End: 2024-12-23
Payer: MEDICARE

## 2024-12-20 ENCOUNTER — TRANSCRIBE ORDERS (OUTPATIENT)
Facility: HOSPITAL | Age: 88
End: 2024-12-20

## 2024-12-20 DIAGNOSIS — M54.50 LOW BACK PAIN, UNSPECIFIED BACK PAIN LATERALITY, UNSPECIFIED CHRONICITY, UNSPECIFIED WHETHER SCIATICA PRESENT: Primary | ICD-10-CM

## 2024-12-20 DIAGNOSIS — M25.551 RIGHT HIP PAIN: ICD-10-CM

## 2024-12-20 PROCEDURE — 87088 URINE BACTERIA CULTURE: CPT

## 2024-12-20 PROCEDURE — 87186 SC STD MICRODIL/AGAR DIL: CPT

## 2024-12-20 PROCEDURE — 87086 URINE CULTURE/COLONY COUNT: CPT

## 2024-12-22 LAB
BACTERIA SPEC CULT: ABNORMAL
CC UR VC: ABNORMAL
SERVICE CMNT-IMP: ABNORMAL

## 2024-12-23 ENCOUNTER — APPOINTMENT (OUTPATIENT)
Facility: HOSPITAL | Age: 88
End: 2024-12-23
Payer: MEDICARE

## 2024-12-23 ENCOUNTER — HOSPITAL ENCOUNTER (OUTPATIENT)
Facility: HOSPITAL | Age: 88
Discharge: HOME | End: 2024-12-26
Payer: MEDICARE

## 2024-12-23 DIAGNOSIS — M54.50 LOW BACK PAIN, UNSPECIFIED BACK PAIN LATERALITY, UNSPECIFIED CHRONICITY, UNSPECIFIED WHETHER SCIATICA PRESENT: ICD-10-CM

## 2024-12-23 DIAGNOSIS — M25.551 RIGHT HIP PAIN: ICD-10-CM

## 2024-12-23 PROCEDURE — 72131 CT LUMBAR SPINE W/O DYE: CPT

## 2024-12-23 PROCEDURE — 72192 CT PELVIS W/O DYE: CPT

## 2024-12-24 LAB
BACTERIA SPEC CULT: ABNORMAL
CC UR VC: ABNORMAL
SERVICE CMNT-IMP: ABNORMAL

## 2024-12-30 ENCOUNTER — TRANSCRIBE ORDERS (OUTPATIENT)
Facility: HOSPITAL | Age: 88
End: 2024-12-30

## 2024-12-30 DIAGNOSIS — S32.041A: Primary | ICD-10-CM

## 2025-01-03 ENCOUNTER — HOSPITAL ENCOUNTER (OUTPATIENT)
Facility: HOSPITAL | Age: 89
Discharge: HOME | End: 2025-01-03
Payer: MEDICARE

## 2025-01-03 DIAGNOSIS — S32.041A: ICD-10-CM

## 2025-01-03 PROCEDURE — 77080 DXA BONE DENSITY AXIAL: CPT

## 2025-01-23 ENCOUNTER — HOSPITAL ENCOUNTER (OUTPATIENT)
Facility: HOSPITAL | Age: 89
Setting detail: SPECIMEN
Discharge: HOME | End: 2025-01-26
Payer: MEDICARE

## 2025-01-23 PROCEDURE — 87086 URINE CULTURE/COLONY COUNT: CPT

## 2025-01-23 PROCEDURE — 87088 URINE BACTERIA CULTURE: CPT

## 2025-01-23 PROCEDURE — 87186 SC STD MICRODIL/AGAR DIL: CPT

## 2025-01-26 LAB
BACTERIA SPEC CULT: ABNORMAL
CC UR VC: ABNORMAL
SERVICE CMNT-IMP: ABNORMAL

## 2025-03-01 ENCOUNTER — APPOINTMENT (OUTPATIENT)
Facility: HOSPITAL | Age: 89
End: 2025-03-01
Payer: MEDICARE

## 2025-03-01 ENCOUNTER — HOSPITAL ENCOUNTER (EMERGENCY)
Facility: HOSPITAL | Age: 89
Discharge: HOME | End: 2025-03-01
Attending: EMERGENCY MEDICINE
Payer: MEDICARE

## 2025-03-01 VITALS
TEMPERATURE: 97.9 F | HEART RATE: 63 BPM | DIASTOLIC BLOOD PRESSURE: 49 MMHG | BODY MASS INDEX: 19.97 KG/M2 | WEIGHT: 120 LBS | SYSTOLIC BLOOD PRESSURE: 128 MMHG | RESPIRATION RATE: 14 BRPM | OXYGEN SATURATION: 92 %

## 2025-03-01 DIAGNOSIS — S09.90XA INJURY OF HEAD, INITIAL ENCOUNTER: ICD-10-CM

## 2025-03-01 DIAGNOSIS — S01.01XA LACERATION OF SCALP, INITIAL ENCOUNTER: Primary | ICD-10-CM
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
## 2025-03-01 PROCEDURE — 99284 EMERGENCY DEPT VISIT MOD MDM: CPT

## 2025-03-01 PROCEDURE — 70450 CT HEAD/BRAIN W/O DYE: CPT

## 2025-03-01 PROCEDURE — 12001 RPR S/N/AX/GEN/TRNK 2.5CM/<: CPT

## 2025-03-01 PROCEDURE — 6370000000 HC RX 637 (ALT 250 FOR IP): Performed by: EMERGENCY MEDICINE

## 2025-03-01 PROCEDURE — 72125 CT NECK SPINE W/O DYE: CPT

## 2025-03-01 RX ORDER — ONDANSETRON 4 MG/1
4 TABLET, ORALLY DISINTEGRATING ORAL
Status: COMPLETED | OUTPATIENT
Start: 2025-03-01 | End: 2025-03-01

## 2025-03-01 RX ADMIN — ONDANSETRON 4 MG: 4 TABLET, ORALLY DISINTEGRATING ORAL at 11:14

## 2025-03-01 ASSESSMENT — PAIN SCALES - GENERAL
PAINLEVEL_OUTOF10: 8
PAINLEVEL_OUTOF10: 6

## 2025-03-01 ASSESSMENT — ENCOUNTER SYMPTOMS
SHORTNESS OF BREATH: 0
COUGH: 0
EYE REDNESS: 0
ABDOMINAL PAIN: 0
VOMITING: 0
SORE THROAT: 0
DIARRHEA: 0
NAUSEA: 0

## 2025-03-01 ASSESSMENT — PAIN - FUNCTIONAL ASSESSMENT: PAIN_FUNCTIONAL_ASSESSMENT: 0-10

## 2025-03-01 ASSESSMENT — LIFESTYLE VARIABLES
HOW OFTEN DO YOU HAVE A DRINK CONTAINING ALCOHOL: NEVER
HOW MANY STANDARD DRINKS CONTAINING ALCOHOL DO YOU HAVE ON A TYPICAL DAY: PATIENT DOES NOT DRINK

## 2025-03-01 ASSESSMENT — PAIN DESCRIPTION - LOCATION: LOCATION: HEAD

## 2025-03-01 NOTE — ED TRIAGE NOTES
Pt arrives from assisted living side of [facility] after a mechanical GLF. Pt is not on thinners, no LOC. Has a large laceration to left side of forehead.  Pt has a pmhx of HTN and hyponatremia. Pt also has fentanyl patch to right side of back for chronic pain.

## 2025-03-01 NOTE — ED NOTES
Called [facility] and gave discharge information to Candida Quintero RN .  Patient CT reports also sent with patient.  Discussed wound care and staple removal in 7 days.  Dread continually getting out of bed. Bed alarm on and she was given a clean brief and a drink.  Awaiting [facility] transport

## 2025-03-01 NOTE — ED NOTES
I have reviewed discharge instructions with the caregiver. The caregiver verbalized understanding. Discharge medications discussed with patient. No questions at this time.  [facility] staff here to  patient.   Given documents

## 2025-03-01 NOTE — ED PROVIDER NOTES
EMERGENCY DEPARTMENT HISTORY AND PHYSICAL EXAM      Date: 3/1/2025  Patient Name: Puja Johnson    History of Presenting Illness     Chief Complaint   Patient presents with    Fall       History Provided By: Patient, EMS, assisted living facility staff    HPI: Puja Johnson, 89 y.o. female with past medical history listed below, presents via EMS to the ED with cc of fall.  EMS reports patient had a ground-level fall at the assisted living facility today.  She sustained a laceration to her left temple area.  No reported loss of consciousness.  Baseline mental status per facility.  No other known injuries.  Patient currently without complaints.        There are no other complaints, changes, or physical findings at this time.    PCP: Ashwini Cyr, APRN - NP    No current facility-administered medications on file prior to encounter.     Current Outpatient Medications on File Prior to Encounter   Medication Sig Dispense Refill    acetaminophen (TYLENOL) 500 MG tablet Take 1,000 mg by mouth every 6 hours as needed      aspirin 81 MG chewable tablet Take 81 mg by mouth daily      atorvastatin (LIPITOR) 10 MG tablet Take 10 mg by mouth daily      bisacodyl (DULCOLAX) 10 MG suppository Place 10 mg rectally daily as needed      cyanocobalamin 1000 MCG/ML injection Inject 1,000 mcg into the muscle      metoprolol tartrate (LOPRESSOR) 50 MG tablet Take 25 mg by mouth 2 times daily      polyethyl glycol-propyl glycol 0.4-0.3 % (SYSTANE) 0.4-0.3 % ophthalmic solution Apply 1 drop to eye as needed      potassium bicarb-citric acid (EFFER-K) 10 MEQ TBEF effervescent tablet Take 20 mEq by mouth daily         Past History     Past Medical History:  Past Medical History:   Diagnosis Date    Arthritis     Cancer (HCC)     skin ca removed from nose and leg    Chronic pain     left knee    GERD (gastroesophageal reflux disease)     Hypertension     Menopause     Other ill-defined conditions(366.60)     HIGH CHOLESTEROL

## 2025-05-14 ENCOUNTER — HOSPITAL ENCOUNTER (OUTPATIENT)
Facility: HOSPITAL | Age: 89
Setting detail: SPECIMEN
Discharge: HOME | End: 2025-05-17
Payer: MEDICARE

## 2025-05-14 PROCEDURE — 87088 URINE BACTERIA CULTURE: CPT

## 2025-05-14 PROCEDURE — 87086 URINE CULTURE/COLONY COUNT: CPT

## 2025-05-14 PROCEDURE — 87186 SC STD MICRODIL/AGAR DIL: CPT

## 2025-05-17 LAB
BACTERIA SPEC CULT: ABNORMAL
CC UR VC: ABNORMAL
SERVICE CMNT-IMP: ABNORMAL

## 2025-05-24 ENCOUNTER — HOSPITAL ENCOUNTER (OUTPATIENT)
Facility: HOSPITAL | Age: 89
Setting detail: SPECIMEN
Discharge: HOME | End: 2025-05-27
Payer: MEDICARE

## 2025-05-24 PROCEDURE — 87186 SC STD MICRODIL/AGAR DIL: CPT

## 2025-05-24 PROCEDURE — 87088 URINE BACTERIA CULTURE: CPT

## 2025-05-24 PROCEDURE — 87086 URINE CULTURE/COLONY COUNT: CPT

## 2025-05-24 PROCEDURE — 81001 URINALYSIS AUTO W/SCOPE: CPT

## 2025-05-25 LAB
APPEARANCE UR: ABNORMAL
BILIRUB UR QL: NEGATIVE
COLOR UR: ABNORMAL
GLUCOSE UR STRIP.AUTO-MCNC: NEGATIVE MG/DL
HGB UR QL STRIP: NEGATIVE
KETONES UR QL STRIP.AUTO: NEGATIVE MG/DL
LEUKOCYTE ESTERASE UR QL STRIP.AUTO: ABNORMAL
NITRITE UR QL STRIP.AUTO: POSITIVE
PH UR STRIP: 8.5 (ref 5–8)
PROT UR STRIP-MCNC: ABNORMAL MG/DL
SP GR UR REFRACTOMETRY: 1.01 (ref 1–1.03)
UROBILINOGEN UR QL STRIP.AUTO: 0.2 EU/DL (ref 0.2–1)

## 2025-05-27 LAB
BACTERIA SPEC CULT: ABNORMAL
CC UR VC: ABNORMAL
SERVICE CMNT-IMP: ABNORMAL

## 2025-06-06 ENCOUNTER — HOSPITAL ENCOUNTER (OUTPATIENT)
Facility: HOSPITAL | Age: 89
Setting detail: SPECIMEN
Discharge: HOME | End: 2025-06-09
Payer: MEDICARE

## 2025-06-06 PROCEDURE — 87086 URINE CULTURE/COLONY COUNT: CPT

## 2025-06-08 LAB
BACTERIA SPEC CULT: NORMAL
CC UR VC: NORMAL
SERVICE CMNT-IMP: NORMAL